# Patient Record
Sex: FEMALE | Race: WHITE | HISPANIC OR LATINO | URBAN - METROPOLITAN AREA
[De-identification: names, ages, dates, MRNs, and addresses within clinical notes are randomized per-mention and may not be internally consistent; named-entity substitution may affect disease eponyms.]

---

## 2017-09-22 ENCOUNTER — EMERGENCY (EMERGENCY)
Facility: HOSPITAL | Age: 82
LOS: 1 days | End: 2017-09-22
Payer: MEDICARE

## 2017-09-22 PROCEDURE — 71010: CPT | Mod: 26

## 2017-09-22 PROCEDURE — 71250 CT THORAX DX C-: CPT | Mod: 26

## 2017-09-22 PROCEDURE — 99285 EMERGENCY DEPT VISIT HI MDM: CPT

## 2019-05-25 ENCOUNTER — TRANSCRIPTION ENCOUNTER (OUTPATIENT)
Age: 84
End: 2019-05-25

## 2020-03-06 ENCOUNTER — INPATIENT (INPATIENT)
Facility: HOSPITAL | Age: 85
LOS: 3 days | Discharge: HOME HEALTH | DRG: 470 | End: 2020-03-10
Attending: FAMILY MEDICINE | Admitting: FAMILY MEDICINE
Payer: MEDICARE

## 2020-03-06 VITALS
WEIGHT: 130.95 LBS | SYSTOLIC BLOOD PRESSURE: 131 MMHG | TEMPERATURE: 98 F | RESPIRATION RATE: 16 BRPM | HEIGHT: 64 IN | DIASTOLIC BLOOD PRESSURE: 73 MMHG | HEART RATE: 85 BPM

## 2020-03-06 DIAGNOSIS — S72.002A FRACTURE OF UNSPECIFIED PART OF NECK OF LEFT FEMUR, INITIAL ENCOUNTER FOR CLOSED FRACTURE: ICD-10-CM

## 2020-03-06 DIAGNOSIS — N39.0 URINARY TRACT INFECTION, SITE NOT SPECIFIED: ICD-10-CM

## 2020-03-06 LAB
ABO RH CONFIRMATION: SIGNIFICANT CHANGE UP
ADD ON TEST-SPECIMEN IN LAB: SIGNIFICANT CHANGE UP
ADD ON TEST-SPECIMEN IN LAB: SIGNIFICANT CHANGE UP
ALBUMIN SERPL ELPH-MCNC: 2.7 G/DL — LOW (ref 3.3–5)
ALBUMIN SERPL ELPH-MCNC: 3 G/DL — LOW (ref 3.3–5)
ALP SERPL-CCNC: 113 U/L — SIGNIFICANT CHANGE UP (ref 40–120)
ALT FLD-CCNC: 13 U/L — SIGNIFICANT CHANGE UP (ref 12–78)
ANION GAP SERPL CALC-SCNC: 9 MMOL/L — SIGNIFICANT CHANGE UP (ref 5–17)
APPEARANCE UR: ABNORMAL
APTT BLD: 26.3 SEC — LOW (ref 27.5–36.3)
AST SERPL-CCNC: 19 U/L — SIGNIFICANT CHANGE UP (ref 15–37)
BACTERIA # UR AUTO: ABNORMAL
BASOPHILS # BLD AUTO: 0.07 K/UL — SIGNIFICANT CHANGE UP (ref 0–0.2)
BASOPHILS NFR BLD AUTO: 0.4 % — SIGNIFICANT CHANGE UP (ref 0–2)
BILIRUB SERPL-MCNC: 0.8 MG/DL — SIGNIFICANT CHANGE UP (ref 0.2–1.2)
BILIRUB UR-MCNC: NEGATIVE — SIGNIFICANT CHANGE UP
BLD GP AB SCN SERPL QL: SIGNIFICANT CHANGE UP
BUN SERPL-MCNC: 34 MG/DL — HIGH (ref 7–23)
CALCIUM SERPL-MCNC: 9 MG/DL — SIGNIFICANT CHANGE UP (ref 8.5–10.1)
CHLORIDE SERPL-SCNC: 107 MMOL/L — SIGNIFICANT CHANGE UP (ref 96–108)
CO2 SERPL-SCNC: 19 MMOL/L — LOW (ref 22–31)
COLOR SPEC: YELLOW — SIGNIFICANT CHANGE UP
CREAT SERPL-MCNC: 3.11 MG/DL — HIGH (ref 0.5–1.3)
DIFF PNL FLD: ABNORMAL
EOSINOPHIL # BLD AUTO: 0 K/UL — SIGNIFICANT CHANGE UP (ref 0–0.5)
EOSINOPHIL NFR BLD AUTO: 0 % — SIGNIFICANT CHANGE UP (ref 0–6)
EPI CELLS # UR: SIGNIFICANT CHANGE UP
GLUCOSE SERPL-MCNC: 115 MG/DL — HIGH (ref 70–99)
GLUCOSE UR QL: NEGATIVE MG/DL — SIGNIFICANT CHANGE UP
HCT VFR BLD CALC: 34.6 % — SIGNIFICANT CHANGE UP (ref 34.5–45)
HGB BLD-MCNC: 11.2 G/DL — LOW (ref 11.5–15.5)
IMM GRANULOCYTES NFR BLD AUTO: 0.7 % — SIGNIFICANT CHANGE UP (ref 0–1.5)
INR BLD: 1.19 RATIO — HIGH (ref 0.88–1.16)
KETONES UR-MCNC: NEGATIVE — SIGNIFICANT CHANGE UP
LEUKOCYTE ESTERASE UR-ACNC: ABNORMAL
LYMPHOCYTES # BLD AUTO: 1.24 K/UL — SIGNIFICANT CHANGE UP (ref 1–3.3)
LYMPHOCYTES # BLD AUTO: 6.8 % — LOW (ref 13–44)
MCHC RBC-ENTMCNC: 28.1 PG — SIGNIFICANT CHANGE UP (ref 27–34)
MCHC RBC-ENTMCNC: 32.4 GM/DL — SIGNIFICANT CHANGE UP (ref 32–36)
MCV RBC AUTO: 86.9 FL — SIGNIFICANT CHANGE UP (ref 80–100)
MONOCYTES # BLD AUTO: 1.17 K/UL — HIGH (ref 0–0.9)
MONOCYTES NFR BLD AUTO: 6.4 % — SIGNIFICANT CHANGE UP (ref 2–14)
NEUTROPHILS # BLD AUTO: 15.61 K/UL — HIGH (ref 1.8–7.4)
NEUTROPHILS NFR BLD AUTO: 85.7 % — HIGH (ref 43–77)
NITRITE UR-MCNC: NEGATIVE — SIGNIFICANT CHANGE UP
PH UR: 6 — SIGNIFICANT CHANGE UP (ref 5–8)
PLATELET # BLD AUTO: 315 K/UL — SIGNIFICANT CHANGE UP (ref 150–400)
POTASSIUM SERPL-MCNC: 3.5 MMOL/L — SIGNIFICANT CHANGE UP (ref 3.5–5.3)
POTASSIUM SERPL-SCNC: 3.5 MMOL/L — SIGNIFICANT CHANGE UP (ref 3.5–5.3)
PROT SERPL-MCNC: 8.3 GM/DL — SIGNIFICANT CHANGE UP (ref 6–8.3)
PROT UR-MCNC: 30 MG/DL
PROTHROM AB SERPL-ACNC: 13.3 SEC — HIGH (ref 10–12.9)
RBC # BLD: 3.98 M/UL — SIGNIFICANT CHANGE UP (ref 3.8–5.2)
RBC # FLD: 12.8 % — SIGNIFICANT CHANGE UP (ref 10.3–14.5)
RBC CASTS # UR COMP ASSIST: ABNORMAL /HPF (ref 0–4)
SODIUM SERPL-SCNC: 135 MMOL/L — SIGNIFICANT CHANGE UP (ref 135–145)
SP GR SPEC: 1.01 — SIGNIFICANT CHANGE UP (ref 1.01–1.02)
TROPONIN I SERPL-MCNC: <0.015 NG/ML — SIGNIFICANT CHANGE UP (ref 0.01–0.04)
UROBILINOGEN FLD QL: NEGATIVE MG/DL — SIGNIFICANT CHANGE UP
WBC # BLD: 18.22 K/UL — HIGH (ref 3.8–10.5)
WBC # FLD AUTO: 18.22 K/UL — HIGH (ref 3.8–10.5)
WBC UR QL: SIGNIFICANT CHANGE UP

## 2020-03-06 PROCEDURE — 93005 ELECTROCARDIOGRAM TRACING: CPT

## 2020-03-06 PROCEDURE — 87040 BLOOD CULTURE FOR BACTERIA: CPT

## 2020-03-06 PROCEDURE — 80048 BASIC METABOLIC PNL TOTAL CA: CPT

## 2020-03-06 PROCEDURE — 85610 PROTHROMBIN TIME: CPT

## 2020-03-06 PROCEDURE — 85730 THROMBOPLASTIN TIME PARTIAL: CPT

## 2020-03-06 PROCEDURE — 97116 GAIT TRAINING THERAPY: CPT | Mod: GP

## 2020-03-06 PROCEDURE — 73501 X-RAY EXAM HIP UNI 1 VIEW: CPT | Mod: RT

## 2020-03-06 PROCEDURE — 73502 X-RAY EXAM HIP UNI 2-3 VIEWS: CPT | Mod: 26,RT

## 2020-03-06 PROCEDURE — 80069 RENAL FUNCTION PANEL: CPT

## 2020-03-06 PROCEDURE — 36415 COLL VENOUS BLD VENIPUNCTURE: CPT

## 2020-03-06 PROCEDURE — 86920 COMPATIBILITY TEST SPIN: CPT

## 2020-03-06 PROCEDURE — 82040 ASSAY OF SERUM ALBUMIN: CPT

## 2020-03-06 PROCEDURE — 97110 THERAPEUTIC EXERCISES: CPT | Mod: GP

## 2020-03-06 PROCEDURE — 88305 TISSUE EXAM BY PATHOLOGIST: CPT

## 2020-03-06 PROCEDURE — 73552 X-RAY EXAM OF FEMUR 2/>: CPT | Mod: 26,RT

## 2020-03-06 PROCEDURE — 87186 SC STD MICRODIL/AGAR DIL: CPT

## 2020-03-06 PROCEDURE — 71250 CT THORAX DX C-: CPT

## 2020-03-06 PROCEDURE — 99223 1ST HOSP IP/OBS HIGH 75: CPT

## 2020-03-06 PROCEDURE — 71250 CT THORAX DX C-: CPT | Mod: 26

## 2020-03-06 PROCEDURE — 97162 PT EVAL MOD COMPLEX 30 MIN: CPT | Mod: GP

## 2020-03-06 PROCEDURE — 93010 ELECTROCARDIOGRAM REPORT: CPT

## 2020-03-06 PROCEDURE — 88311 DECALCIFY TISSUE: CPT

## 2020-03-06 PROCEDURE — 76770 US EXAM ABDO BACK WALL COMP: CPT

## 2020-03-06 PROCEDURE — C1776: CPT

## 2020-03-06 PROCEDURE — 84484 ASSAY OF TROPONIN QUANT: CPT

## 2020-03-06 PROCEDURE — 93306 TTE W/DOPPLER COMPLETE: CPT

## 2020-03-06 PROCEDURE — 74176 CT ABD & PELVIS W/O CONTRAST: CPT | Mod: 26

## 2020-03-06 PROCEDURE — 85025 COMPLETE CBC W/AUTO DIFF WBC: CPT

## 2020-03-06 PROCEDURE — 87086 URINE CULTURE/COLONY COUNT: CPT

## 2020-03-06 PROCEDURE — 97530 THERAPEUTIC ACTIVITIES: CPT | Mod: GP

## 2020-03-06 PROCEDURE — 76770 US EXAM ABDO BACK WALL COMP: CPT | Mod: 26

## 2020-03-06 PROCEDURE — 99232 SBSQ HOSP IP/OBS MODERATE 35: CPT

## 2020-03-06 PROCEDURE — 82306 VITAMIN D 25 HYDROXY: CPT

## 2020-03-06 PROCEDURE — 71045 X-RAY EXAM CHEST 1 VIEW: CPT | Mod: 26

## 2020-03-06 PROCEDURE — 74176 CT ABD & PELVIS W/O CONTRAST: CPT

## 2020-03-06 PROCEDURE — 85027 COMPLETE CBC AUTOMATED: CPT

## 2020-03-06 RX ORDER — SENNA PLUS 8.6 MG/1
2 TABLET ORAL AT BEDTIME
Refills: 0 | Status: DISCONTINUED | OUTPATIENT
Start: 2020-03-06 | End: 2020-03-07

## 2020-03-06 RX ORDER — PREGABALIN 225 MG/1
1000 CAPSULE ORAL DAILY
Refills: 0 | Status: DISCONTINUED | OUTPATIENT
Start: 2020-03-06 | End: 2020-03-07

## 2020-03-06 RX ORDER — MORPHINE SULFATE 50 MG/1
2 CAPSULE, EXTENDED RELEASE ORAL EVERY 4 HOURS
Refills: 0 | Status: DISCONTINUED | OUTPATIENT
Start: 2020-03-06 | End: 2020-03-06

## 2020-03-06 RX ORDER — HYDROMORPHONE HYDROCHLORIDE 2 MG/ML
0.5 INJECTION INTRAMUSCULAR; INTRAVENOUS; SUBCUTANEOUS EVERY 6 HOURS
Refills: 0 | Status: DISCONTINUED | OUTPATIENT
Start: 2020-03-06 | End: 2020-03-07

## 2020-03-06 RX ORDER — CEFTRIAXONE 500 MG/1
1000 INJECTION, POWDER, FOR SOLUTION INTRAMUSCULAR; INTRAVENOUS ONCE
Refills: 0 | Status: COMPLETED | OUTPATIENT
Start: 2020-03-06 | End: 2020-03-06

## 2020-03-06 RX ORDER — MORPHINE SULFATE 50 MG/1
4 CAPSULE, EXTENDED RELEASE ORAL ONCE
Refills: 0 | Status: DISCONTINUED | OUTPATIENT
Start: 2020-03-06 | End: 2020-03-06

## 2020-03-06 RX ORDER — SODIUM CHLORIDE 9 MG/ML
1000 INJECTION INTRAMUSCULAR; INTRAVENOUS; SUBCUTANEOUS
Refills: 0 | Status: DISCONTINUED | OUTPATIENT
Start: 2020-03-06 | End: 2020-03-07

## 2020-03-06 RX ORDER — ASPIRIN/CALCIUM CARB/MAGNESIUM 324 MG
81 TABLET ORAL DAILY
Refills: 0 | Status: DISCONTINUED | OUTPATIENT
Start: 2020-03-06 | End: 2020-03-06

## 2020-03-06 RX ORDER — ONDANSETRON 8 MG/1
4 TABLET, FILM COATED ORAL EVERY 6 HOURS
Refills: 0 | Status: DISCONTINUED | OUTPATIENT
Start: 2020-03-06 | End: 2020-03-07

## 2020-03-06 RX ORDER — TRAMADOL HYDROCHLORIDE 50 MG/1
25 TABLET ORAL EVERY 6 HOURS
Refills: 0 | Status: DISCONTINUED | OUTPATIENT
Start: 2020-03-06 | End: 2020-03-07

## 2020-03-06 RX ORDER — POLYETHYLENE GLYCOL 3350 17 G/17G
17 POWDER, FOR SOLUTION ORAL DAILY
Refills: 0 | Status: DISCONTINUED | OUTPATIENT
Start: 2020-03-06 | End: 2020-03-07

## 2020-03-06 RX ORDER — SODIUM CHLORIDE 9 MG/ML
1000 INJECTION, SOLUTION INTRAVENOUS
Refills: 0 | Status: DISCONTINUED | OUTPATIENT
Start: 2020-03-06 | End: 2020-03-06

## 2020-03-06 RX ORDER — ASPIRIN/CALCIUM CARB/MAGNESIUM 324 MG
81 TABLET ORAL DAILY
Refills: 0 | Status: DISCONTINUED | OUTPATIENT
Start: 2020-03-06 | End: 2020-03-07

## 2020-03-06 RX ORDER — ASPIRIN/CALCIUM CARB/MAGNESIUM 324 MG
1 TABLET ORAL
Qty: 0 | Refills: 0 | DISCHARGE

## 2020-03-06 RX ORDER — PREGABALIN 225 MG/1
1 CAPSULE ORAL
Qty: 0 | Refills: 0 | DISCHARGE

## 2020-03-06 RX ORDER — TRAMADOL HYDROCHLORIDE 50 MG/1
50 TABLET ORAL EVERY 6 HOURS
Refills: 0 | Status: DISCONTINUED | OUTPATIENT
Start: 2020-03-06 | End: 2020-03-07

## 2020-03-06 RX ORDER — ACETAMINOPHEN 500 MG
975 TABLET ORAL EVERY 8 HOURS
Refills: 0 | Status: DISCONTINUED | OUTPATIENT
Start: 2020-03-06 | End: 2020-03-07

## 2020-03-06 RX ORDER — SODIUM CHLORIDE 9 MG/ML
1000 INJECTION, SOLUTION INTRAVENOUS
Refills: 0 | Status: DISCONTINUED | OUTPATIENT
Start: 2020-03-06 | End: 2020-03-07

## 2020-03-06 RX ORDER — HEPARIN SODIUM 5000 [USP'U]/ML
5000 INJECTION INTRAVENOUS; SUBCUTANEOUS EVERY 8 HOURS
Refills: 0 | Status: COMPLETED | OUTPATIENT
Start: 2020-03-06 | End: 2020-03-06

## 2020-03-06 RX ORDER — FAMOTIDINE 10 MG/ML
20 INJECTION INTRAVENOUS DAILY
Refills: 0 | Status: DISCONTINUED | OUTPATIENT
Start: 2020-03-06 | End: 2020-03-07

## 2020-03-06 RX ADMIN — Medication 81 MILLIGRAM(S): at 18:32

## 2020-03-06 RX ADMIN — Medication 975 MILLIGRAM(S): at 22:07

## 2020-03-06 RX ADMIN — SENNA PLUS 2 TABLET(S): 8.6 TABLET ORAL at 22:03

## 2020-03-06 RX ADMIN — MORPHINE SULFATE 4 MILLIGRAM(S): 50 CAPSULE, EXTENDED RELEASE ORAL at 11:25

## 2020-03-06 RX ADMIN — Medication 975 MILLIGRAM(S): at 22:03

## 2020-03-06 RX ADMIN — FAMOTIDINE 20 MILLIGRAM(S): 10 INJECTION INTRAVENOUS at 18:32

## 2020-03-06 RX ADMIN — HEPARIN SODIUM 5000 UNIT(S): 5000 INJECTION INTRAVENOUS; SUBCUTANEOUS at 22:03

## 2020-03-06 RX ADMIN — MORPHINE SULFATE 4 MILLIGRAM(S): 50 CAPSULE, EXTENDED RELEASE ORAL at 14:34

## 2020-03-06 RX ADMIN — CEFTRIAXONE 1000 MILLIGRAM(S): 500 INJECTION, POWDER, FOR SOLUTION INTRAMUSCULAR; INTRAVENOUS at 12:28

## 2020-03-06 RX ADMIN — Medication 975 MILLIGRAM(S): at 15:33

## 2020-03-06 RX ADMIN — SODIUM CHLORIDE 125 MILLILITER(S): 9 INJECTION INTRAMUSCULAR; INTRAVENOUS; SUBCUTANEOUS at 15:34

## 2020-03-06 RX ADMIN — HYDROMORPHONE HYDROCHLORIDE 0.5 MILLIGRAM(S): 2 INJECTION INTRAMUSCULAR; INTRAVENOUS; SUBCUTANEOUS at 15:34

## 2020-03-06 NOTE — ED PROVIDER NOTE - NS ED ROS FT
Constitutional: nad, well appearing  HEENT:  + nasal congestion, eye drainage or ear pain.    CVS:  no cp  Resp:  No sob, no cough  GI:  no abdominal pain, no nausea or vomiting  :  no dysuria  MSK: + limited ROM/ RLE pain  Skin: no rash  Neuro: no change in mental status or level of consciousness  Heme/lymph: no bleeding

## 2020-03-06 NOTE — ED ADULT NURSE NOTE - OBJECTIVE STATEMENT
Pt reports not feeling well over past few days and therefore not eating anything.  Pt reports that she felt fatigued and weak yesterday and fell at night while turning. Pt reports that her daughter helped to make her comfortable on the floor, but that she did not want to call the ambulance until this am. Pt denies LOC, dizziness, fever, or chills. Reports right hip pain that worsens with movement. +PPx4.

## 2020-03-06 NOTE — CONSULT NOTE ADULT - SUBJECTIVE AND OBJECTIVE BOX
Chief complaints.  Presents post fall at home--noted for Right Hip fx.    HPI:  83 yo woman with unclear PMHX and no medical follow up for several years except for GYN and urgent care centers fell at home due to dizzyness yesterday.  Daughter reports pt felt well on Tuesday, 3/3 and had an active day followed by fatigue and then nausea.  Pt and daughter are adamant that this was due to post nasal drip.  Denies vomiting, diarrhea or abdominal pain.  However, pt had virtually no po intake for 2 1/2 days and when she got up yesterday to let her dog out, she felt dizzy and fell.  Daughter reports pt has had significant weight loss and very decreased po intake since losing her  5 years ago.  However, she has not had any evaluation for this.  On admission, noted with Bun/creat =34/3.11 and more problematically bilateral Hydronephrosis.    Pt reports she was told of "prolapsed bladder" by her GYN but has not had  evaluation.  Pt is tentatively scheduled for OR in am.  Pt is on no meds.  Very seldom takes NSAIDS for arthritis.    PMHX and PSHX.  Unknown.  Unclear hx of Prolapsed Bladder  Arthritis.    FAMILY HISTORY: N/C    SOCIAL HISTORY : No hx of smoking or ETOH.  Allergies    No Known Allergies    REVIEW OF SYSTEMS :  Denies abdominal pain  Denies SOB  Denies chest discomfort  Denies nausea currently  Denies dysuria      MEDICATIONS  (STANDING):  acetaminophen   Tablet .. 975 milliGRAM(s) Oral every 8 hours  aspirin  chewable 81 milliGRAM(s) Oral daily  cyanocobalamin 1000 MICROGram(s) Oral daily  famotidine    Tablet 20 milliGRAM(s) Oral daily  heparin SubCutaneous Injection - Peds 5000 Unit(s) SubCutaneous every 8 hours  lactated ringers. 1000 milliLiter(s) (75 mL/Hr) IV Continuous <Continuous>  polyethylene glycol 3350 17 Gram(s) Oral daily  senna 2 Tablet(s) Oral at bedtime  sodium chloride 0.9%. 1000 milliLiter(s) (125 mL/Hr) IV Continuous <Continuous>    MEDICATIONS  (PRN):  HYDROmorphone  Injectable 0.5 milliGRAM(s) IV Push every 6 hours PRN Severe Pain (7 - 10)  ondansetron Injectable 4 milliGRAM(s) IV Push every 6 hours PRN Nausea and/or Vomiting  traMADol 25 milliGRAM(s) Oral every 6 hours PRN Mild Pain (1 - 3)  traMADol 50 milliGRAM(s) Oral every 6 hours PRN Moderate Pain (4 - 6)         Vital Signs Last 24 Hrs  T(C): 36.7 (06 Mar 2020 11:19), Max: 36.9 (06 Mar 2020 09:48)  T(F): 98.1 (06 Mar 2020 11:19), Max: 98.4 (06 Mar 2020 09:48)  HR: 84 (06 Mar 2020 11:19) (84 - 85)  BP: 101/61 (06 Mar 2020 11:19) (101/61 - 131/73)  BP(mean): --  RR: 17 (06 Mar 2020 11:19) (16 - 17)  SpO2: 98% (06 Mar 2020 11:) (98% - 98%)  Daily Height in cm: 162.56 (06 Mar 2020 09:48)    Daily   I&O's Summary      PHYSICAL EXAM:  Alert and appropriate  GEN: No distress  HEENT: WNL  NECK : supple  CV: S1S2 RRR  LUNGS: Clear to aus  ABD: soft  EXT: no edema    LABS:                        11.2   18.22 )-----------( 315      ( 06 Mar 2020 10:01 )             34.6     03-06    135  |  107  |  34<H>  ----------------------------<  115<H>  3.5   |  19<L>  |  3.11<H>    Ca    9.0      06 Mar 2020 10:01    TPro  8.3  /  Alb  3.0<L>  /  TBili  0.8  /  DBili  x   /  AST  19  /  ALT  13  /  AlkPhos  113  03-06    PT/INR - ( 06 Mar 2020 10:01 )   PT: 13.3 sec;   INR: 1.19 ratio         PTT - ( 06 Mar 2020 10:01 )  PTT:26.3 sec  Urinalysis Basic - ( 06 Mar 2020 11:55 )    Color: Yellow / Appearance: very cloudy / S.010 / pH: x  Gluc: x / Ketone: Negative  / Bili: Negative / Urobili: Negative mg/dL   Blood: x / Protein: 30 mg/dL / Nitrite: Negative   Leuk Esterase: Moderate / RBC: 6-10 /HPF / WBC 3-5   Sq Epi: x / Non Sq Epi: Occasional / Bacteria: TNTC        < from: US Kidney and Bladder (20 @ 13:28) >  EXAM:  US KIDNEYS AND BLADDER                            PROCEDURE DATE:  2020          INTERPRETATION:  CLINICAL INFORMATION: Renal failure.    COMPARISON: None available.    TECHNIQUE: Sonography of the kidneys and bladder.     FINDINGS:    Right kidney:  12 cm. Moderate hydroureteronephrosis. Normal cortical echogenicity    Left kidney:  12.2 cm. Moderate hydroureteronephrosis. Normal cortical echogenicity.    Urinary bladder: Moderately distended with layering debris.    IMPRESSION:     Moderate bilateral hydroureteronephrosis.  Layering debris within the urinary bladder.        SHELLY BARRETT M.D. ATTENDING RADIOLOGIST  This document has been electronically signed. Mar  6 2020  1:48PM                < end of copied text >

## 2020-03-06 NOTE — CONSULT NOTE ADULT - ASSESSMENT
Preoperative cardiac evaluation - Pt was noted to have abnormal ekg.  pt did not f/u with physician in 2 yrs and she did not have any cardiac evaluation before. SHe denies any cardiac symptoms at this time.  Recommend 2 D echocardiogram  Earnestine base further recommendations on the echo results.    HTN- BP elevated at times.  pain control and monitor for now.  Will recommend low dose beta blocker.  D/W Dr Del Angel.     Other medical issues- Management per primary team.   Thank you for allowing me to participate in the care of this patient. Please feel free to contact me with any questions.

## 2020-03-06 NOTE — ED PROVIDER NOTE - PROGRESS NOTE DETAILS
Pt declines CTH and C spine.  Well appearing,  No mental status changes.  XR hip with fracture. Discussed with ortho who recommends admission.  Pt also noted to have UTI.  Treated.  Discussed with Dr. Faith, hospitalist, who has accepted for further w/u and management. Further care per inpatient treatment team.

## 2020-03-06 NOTE — CONSULT NOTE ADULT - ASSESSMENT
83 yo woman with unknown medical hx admitted with Right Hip fx due to fall.  Noted for renal dysfunction with unknown hx and bilateral moderate hydronephrosis.  Kidneys are noted to be of normal echogenicity and size indicating obstruction may be more acute.  At this point, level of obstruction is unclear.  Suggest  evaluation prior to surgery to resolve obstruction.  Feel combination of obstructive uropathy with possible post op ATN would result in unacceptable loss of kidney function leading to greater complications, mary ellen when pt's baseline renal function is unknown with current GFR at <15 cc/min.  CT non-contrast to attempt to identify level of obstruction.  Will re-evaluate in am once  evaluation is done.

## 2020-03-06 NOTE — H&P ADULT - NSHPPHYSICALEXAM_GEN_ALL_CORE
Vital Signs Last 24 Hrs  T(C): 36.7 (06 Mar 2020 11:19), Max: 36.9 (06 Mar 2020 09:48)  T(F): 98.1 (06 Mar 2020 11:19), Max: 98.4 (06 Mar 2020 09:48)  HR: 84 (06 Mar 2020 11:19) (84 - 85)  BP: 101/61 (06 Mar 2020 11:19) (101/61 - 131/73)  RR: 17 (06 Mar 2020 11:19) (16 - 17)  SpO2: 98% (06 Mar 2020 11:19) (98% - 98%)    PHYSICAL EXAM:    GENERAL: Comfortable, no acute distress   HEAD:  Normocephalic, +frontal hematoma.   EYES: EOMI, PERRLA  HEENT: dry  mucous membranes  NECK: Supple, No JVD  NERVOUS SYSTEM:  Alert & Oriented X3, non focal.   CHEST/LUNG: Clear to auscultation bilaterally  HEART: Regular rate and rhythm  ABDOMEN: Soft, Nontender, Nondistended, Bowel sounds present  GENITOURINARY: Voiding, no palpable bladder  EXTREMITIES:   No clubbing, cyanosis, or edema  MUSCULOSKELETAL- Right Lower extremity shortened, everted.  SKIN-no rash

## 2020-03-06 NOTE — CONSULT NOTE ADULT - SUBJECTIVE AND OBJECTIVE BOX
84y Female presents to ED c/o Right hip pain and inability to ambulate sp mechanical fall. She went to let the dog out this morning and fell onto right side. States hit her head against the wall but denies LOC. Ambulates without assistive device. Denies right hip pain prior to this fall. Denies numbness/tingling RLE. Denies fever/chills. No other extremity complaints. Last ate 2 days ago. Pt states she has been sick with post nasal drip and as a result has not had an appetite. Son and daughter at bedside.     PMH: decsened bladder  NKDA                            11.2   18.22 )-----------( 315      ( 06 Mar 2020 10:01 )             34.6     06 Mar 2020 10:01    135    |  107    |  34     ----------------------------<  115    3.5     |  19     |  3.11     Ca    9.0        06 Mar 2020 10:01    TPro  8.3    /  Alb  3.0    /  TBili  0.8    /  DBili  x      /  AST  19     /  ALT  13     /  AlkPhos  113    06 Mar 2020 10:01    PT/INR - ( 06 Mar 2020 10:01 )   PT: 13.3 sec;   INR: 1.19 ratio         PTT - ( 06 Mar 2020 10:01 )  PTT:26.3 sec  Vital Signs Last 24 Hrs  T(C): 36.7 (03-06-20 @ 11:19), Max: 36.9 (03-06-20 @ 09:48)  T(F): 98.1 (03-06-20 @ 11:19), Max: 98.4 (03-06-20 @ 09:48)  HR: 84 (03-06-20 @ 11:19) (84 - 85)  BP: 101/61 (03-06-20 @ 11:19) (101/61 - 131/73)  BP(mean): --  RR: 17 (03-06-20 @ 11:19) (16 - 17)  SpO2: 98% (03-06-20 @ 11:19) (98% - 98%)    Imaging: XR pelvis/ rigth hip: demonstates right femoral neck fracture    Physical Exam  General: NAD, Alert, Awake and oriented  B/L UE: skin intact No pain, FROM, non tender, no deformities, NVI, SILT     LLE: able to SLR, no bony tenderness, neg heel strike, neg log roll, FROM hip knee ankle and toes, SILT    RIGHT LE: No open skin.  No deformities or other signs of trauma at  knee, lower leg, ankle or foot. Full baseline painless ROM at ankle and toes. Positive log-roll and heel strike. Unable to actively SLR. SILT toes 1-5. + DP/PT pulses palpable with brisk cap refill distally. Compartments soft and compressible.

## 2020-03-06 NOTE — ED PROVIDER NOTE - CLINICAL SUMMARY MEDICAL DECISION MAKING FREE TEXT BOX
Given age will evaul for intercranial head trauma with CT head, will evaul for pain with CT. Concern for hip fracture, will evaul with imaging. Will send for pre op labs in the event this is a fracture, dispo pending imaging.

## 2020-03-06 NOTE — ED ADULT NURSE REASSESSMENT NOTE - NS ED NURSE REASSESS COMMENT FT1
MD Hospitalist at bedside and confirmed to give abx with no anticipated BC.  Pt awaiting bed availability at this time. Will cont to monitor.  Reports good relief of pain with medication.

## 2020-03-06 NOTE — ED PROVIDER NOTE - PHYSICAL EXAMINATION
Constitutional: NAD, well appearing  HEENT: no rhinorrhea, PERRL, no oropharyngeal erythema or exudates, midline uvula.  TMs clear.  CVS:  RRR, no m/r/g  Resp:  CTAB  GI: soft, ntnd  MSK:  no restriction to rom, full ROM to all extremities  Neuro:  A&Ox3, 5/5 strength to all extremities,  SILT to all extremities  Skin: no rash  psych: clear thought content  Heme/lymph:  No LAD Constitutional: NAD, well appearing  HEENT: no rhinorrhea, PERRL, no oropharyngeal erythema or exudates, midline uvula.  TMs clear.  CVS:  RRR, no m/r/g  Resp:  CTAB  GI: soft, ntnd  MSK: No midline spinal tenderness. TTP right hip. pain with any form of ROM  Neuro:  A&Ox3, 5/5 strength to all extremities,  SILT to all extremities. Neuro vascular intact to RLE.   Skin: +Frontal Scalp hematoma.   psych: clear thought content  Heme/lymph:  No LAD

## 2020-03-06 NOTE — H&P ADULT - HISTORY OF PRESENT ILLNESS
c/c: fall , right hip fracture    HPI: 84F, h/o bladder prolapse, does not follow regularly with physicians who presented to the ER with inability to ambulate after a fall. She hadn't been feeling well for the past few days with rhinitis. She got up to let her dog out, felt dizzy and fell last night. She wasn't able to get up on her own. She dragged herself to the den where her daughter found her around 11pm last night. She helped her into a more comfortable position and slept there overnight. She was brought to the ER today and found to have RIght hip fracture. Planned for OR 3/7. She denies any medical history. No h/o MI/stroke/vascular disease. Does not really exercise. Ambulates within her home and can climb her stairs without difficulty. No recent chest pains/sob. Denies fever but did have chills. No cough.  No n/v/d. Has had poor appetite/po intake since her  passed away 5 years ago.   +weight loss, can not quantify.  Denies decreased urine outpt/dysuria/hematuria. had vague flank pain a few days ago which was self limited. Denies recent nsaid use. Takes asa 81 daily when she remembers.    ROS: all 10 systems reviewed and is as above otherwise negative.     PMH: as above  PSH: denies  F/H: denies significant medical conditions in immediate family members  Social: denies tobacco/etoh use  Allergies: NKDA  Home meds: see med rec.

## 2020-03-06 NOTE — CONSULT NOTE ADULT - SUBJECTIVE AND OBJECTIVE BOX
Patient is a 84y old  Female who presents with a chief complaint of right hip fracture.       HPI:  c/c: fall , right hip fracture    HPI: 84F, h/o bladder prolapse, does not follow regularly with physicians who presented to the ER with inability to ambulate after a fall. She hadn't been feeling well for the past few days with rhinitis. She got up to let her dog out, felt dizzy and fell last night. She wasn't able to get up on her own. She dragged herself to the den where her daughter found her around 11pm last night. She helped her into a more comfortable position and slept there overnight. She was brought to the ER today and found to have RIght hip fracture. Planned for OR 3/7. She denies any medical history. No h/o MI/stroke/vascular disease. Does not really exercise. Ambulates within her home and can climb her stairs without difficulty. No recent chest pains/sob. Denies fever but did have chills. No cough.  No n/v/d. Has had poor appetite/po intake since her  passed away 5 years ago.   +weight loss, can not quantify.  Denies decreased urine outpt/dysuria/hematuria. had vague flank pain a few days ago which was self limited. Denies recent nsaid use. Takes asa 81 daily when she remembers.      PMH: as above  PSH: denies  F/H: denies significant medical conditions in immediate family members  Social: denies tobacco/etoh use  Allergies: NKDA  Home meds: see med rec. (06 Mar 2020 12:55)      PAST MEDICAL & SURGICAL HISTORY:      MEDICATIONS  (STANDING):  acetaminophen   Tablet .. 975 milliGRAM(s) Oral every 8 hours  aspirin  chewable 81 milliGRAM(s) Oral daily  cyanocobalamin 1000 MICROGram(s) Oral daily  famotidine    Tablet 20 milliGRAM(s) Oral daily  heparin SubCutaneous Injection - Peds 5000 Unit(s) SubCutaneous every 8 hours  lactated ringers. 1000 milliLiter(s) (75 mL/Hr) IV Continuous <Continuous>  polyethylene glycol 3350 17 Gram(s) Oral daily  senna 2 Tablet(s) Oral at bedtime  sodium chloride 0.9%. 1000 milliLiter(s) (125 mL/Hr) IV Continuous <Continuous>    MEDICATIONS  (PRN):  HYDROmorphone  Injectable 0.5 milliGRAM(s) IV Push every 6 hours PRN Severe Pain (7 - 10)  ondansetron Injectable 4 milliGRAM(s) IV Push every 6 hours PRN Nausea and/or Vomiting  traMADol 25 milliGRAM(s) Oral every 6 hours PRN Mild Pain (1 - 3)  traMADol 50 milliGRAM(s) Oral every 6 hours PRN Moderate Pain (4 - 6)      REVIEW OF SYSTEMS:  CONSTITUTIONAL:    No fatigue, malaise, lethargy.  No fever or chills.  RESPIRATORY:  No cough.  No wheeze.  No hemoptysis.  No shortness of breath.  CARDIOVASCULAR:  No chest pains.  No palpitations. No shortness of breath, No orthopnea or PND.  GASTROINTESTINAL:  No abdominal pain.  No nausea or vomiting.    GENITOURINARY:    No hematuria.    MUSCULOSKELETAL:  c/o musculoskeletal pain.  No joint swelling.  No arthritis.  NEUROLOGICAL:  No tingling or numbness or weakness.  PSYCHIATRIC:  No confusion          Vital Signs Last 24 Hrs  T(C): 36.7 (06 Mar 2020 11:19), Max: 36.9 (06 Mar 2020 09:48)  T(F): 98.1 (06 Mar 2020 11:19), Max: 98.4 (06 Mar 2020 09:48)  HR: 83 (06 Mar 2020 12:00) (83 - 85)  BP: 117/82 (06 Mar 2020 12:00) (101/61 - 131/73)  BP(mean): --  RR: 18 (06 Mar 2020 12:00) (16 - 18)  SpO2: 100% (06 Mar 2020 12:00) (98% - 100%)    PHYSICAL EXAM-    Constitutional: elderly frail female in no acute distress      Head: Head is normocephalic and atraumatic.      Neck:  No JVD.     Cardiovascular: Regular rate and rhythm without S3, S4. No murmurs or rubs are appreciated.      Respiratory: Breath sounds are normal. No rales. No wheezing.    Abdomen: Soft, nontender, nondistended with positive bowel sounds.      Extremity: No tenderness. No  pitting edema     Neurologic: The patient is alert and oriented.      Skin: No rash, no obvious lesions noted.      Psychiatric: The patient appears to be emotionally stable.      INTERPRETATION OF TELEMETRY: SR     ECG: Sinus rythm , RBBB, T wave inversion in V2-3, III.     I&O's Detail      LABS:                        11.2   18.22 )-----------( 315      ( 06 Mar 2020 10:01 )             34.6     03-06    135  |  107  |  34<H>  ----------------------------<  115<H>  3.5   |  19<L>  |  3.11<H>    Ca    9.0      06 Mar 2020 10:01    TPro  8.3  /  Alb  3.0<L>  /  TBili  0.8  /  DBili  x   /  AST  19  /  ALT  13  /  AlkPhos  113  03-06    CARDIAC MARKERS ( 06 Mar 2020 10:01 )  <0.015 ng/mL / x     / 189 U/L / x     / x          PT/INR - ( 06 Mar 2020 10:01 )   PT: 13.3 sec;   INR: 1.19 ratio         PTT - ( 06 Mar 2020 10:01 )  PTT:26.3 sec  Urinalysis Basic - ( 06 Mar 2020 11:55 )    Color: Yellow / Appearance: very cloudy / S.010 / pH: x  Gluc: x / Ketone: Negative  / Bili: Negative / Urobili: Negative mg/dL   Blood: x / Protein: 30 mg/dL / Nitrite: Negative   Leuk Esterase: Moderate / RBC: 6-10 /HPF / WBC 3-5   Sq Epi: x / Non Sq Epi: Occasional / Bacteria: TNTC      I&O's Summary    BNP  RADIOLOGY & ADDITIONAL STUDIES:

## 2020-03-06 NOTE — ED PROVIDER NOTE - OBJECTIVE STATEMENT
85y/o F presents to the ED s/p fall while letting dog outside last night c/o right LE pain with assoc. sx of inability to weight bear. Pt states that she felt sx of lightheadedness prior to fall, fell onto right hip and hit her head, no LOC. Pt stayed on the floor until this morning due to inability to get up independently. She reports recent sx of nasal congestion that have been making her feel "lousy". No CP/SOB.

## 2020-03-06 NOTE — ED ADULT TRIAGE NOTE - IDEAL BODY WEIGHT(KG)
55
no loss of consciousness, no gait abnormality, no headache, no sensory deficits, and no weakness.

## 2020-03-06 NOTE — PATIENT PROFILE ADULT - ...
Dr. García: I performed a face to face bedside interview with patient regarding history of present illness, review of symptoms and past medical history. I completed an independent physical exam.  I have discussed patient's plan of care with PA.   I agree with note as stated above, having amended the EMR as needed to reflect my findings.   This includes HISTORY OF PRESENT ILLNESS, HIV, PAST MEDICAL/SURGICAL/FAMILY/SOCIAL HISTORY, ALLERGIES AND HOME MEDICATIONS, REVIEW OF SYSTEMS, PHYSICAL EXAM, and any PROGRESS NOTES during the time I functioned as the attending physician for this patient.    68M p/w with HTN and Hep C on Humera p/w pain to left foot dorsum x 3 days. Pt states a power wash ran over his left foot, suffered abrasions to the foot. Able to ambulate. Requesting abx. Noticed some redness around wound. Tdap utd. Pt has been pouring H2O2 over it.    Exam significant for avulsed skin at the base of 2nd 3rd and 4th toes over the dorsum with minimal erythema. No bony ttp.    Plan - advised pt to dc H2O2, xray and abx, but pt eloped prior to abx and xray. Called pt at home and advised return but pt declined. Offered to sent Rx for abx to pharmacy, but pt declined to give pharmacy info 06-Mar-2020 17:38:37

## 2020-03-06 NOTE — H&P ADULT - ASSESSMENT
83F, pmh as above a/w:    1. Fall/Right hip fracture:  -admit to medicine  -repeat ekg.   -likely dizzy d/t dehydration/hypovolemia  -for OR tomorrow.   -if labs improved tomorrow and repeat ekg without concerning changes,  no medical contraindications for OR.  -physical therapy post op  -incentive spirometry  -bowel regimen.     2. Leukocytosis:  -?reactive to fall/fracture  -repeat in am.   -u/a not consistent with uti, dc abx.    3. 83F, pmh as above a/w:    1. Fall/Right hip fracture:  -admit to medicine  -repeat ekg.   -likely dizzy d/t dehydration/hypovolemia  -for OR tomorrow.   -if labs improved tomorrow and repeat ekg without concerning changes,  no medical contraindications for OR.  -physical therapy post op  -incentive spirometry  -bowel regimen.     2. Leukocytosis:  -likely reactive to fall/fracture  -repeat in am.   -u/a not consistent with uti, dc abx.  -monitor closely for s/s of infn.    3. FRANCIS vs CKD:  -sono ordered earlier, results noted,   -?obstructive uropathy from incomplete emptying?  -check bladder scan, straight cath if >300cc post void residual.   -CPK added on, no rhabdo    4. DVT px:  -hep sq.

## 2020-03-06 NOTE — CONSULT NOTE ADULT - ASSESSMENT
A/P: 84y Female with Right hip femoral neck fracture    Plan:  Pain control  NWB Right LE, bedrest  medical admission for optimization for surgery   options risks benefits complications reviewed with patient and family at bedside. They understand and agree to proposed procedure, Right hip jojo vs possible total hip replacement   consent obtained for surgery and blood transfusion   FU labs, CBC, EKG, CXR, BMP, PT , PTT, T&S  NPO after mindnight   hold chemical anticoagulation   venodynes/foot pumps  Discussed above with Ortho Attending on call Dr Holm who is aware and agrees with plan   will plan for OR tomorrow morning

## 2020-03-06 NOTE — ED PROVIDER NOTE - CARE PLAN
Principal Discharge DX:	Hip fracture, left, closed, initial encounter  Secondary Diagnosis:	Urinary tract infection without hematuria, site unspecified

## 2020-03-06 NOTE — PROGRESS NOTE ADULT - PROBLEM SELECTOR PLAN 1
Hydro secondary to baldder floor prolapse,  CAT pending, Rosario placed.  May have procedure from , Observe creatinine  If no improvement may need nephrostomies and possible GUN surgery

## 2020-03-06 NOTE — CONSULT NOTE ADULT - ATTENDING COMMENTS
A closed reduction with no manipulation was done by allowing the patient to lay as she felt comfortable.   The hip was not manipulated.

## 2020-03-06 NOTE — ED ADULT NURSE NOTE - NSIMPLEMENTINTERV_GEN_ALL_ED
Implemented All Fall with Harm Risk Interventions:  [place] to call system. Call bell, personal items and telephone within reach. Instruct patient to call for assistance. Room bathroom lighting operational. Non-slip footwear when patient is off stretcher. Physically safe environment: no spills, clutter or unnecessary equipment. Stretcher in lowest position, wheels locked, appropriate side rails in place. Provide visual cue, wrist band, yellow gown, etc. Monitor gait and stability. Monitor for mental status changes and reorient to person, place, and time. Review medications for side effects contributing to fall risk. Reinforce activity limits and safety measures with patient and family. Provide visual clues: red socks.

## 2020-03-06 NOTE — ED ADULT NURSE NOTE - CHIEF COMPLAINT QUOTE
Pt. to ED BIBA S/P Unwitnessed Fall. Pt. states she tripped and fell and landed on right hip. + Small hematoma in front of head. Denies LOC, Aspirin, Blood Thinners and major medical hx.  GSC 15- Denies CP, SOB, Abdominal injuries. Pt. does not meet Criteria for TA at this time.

## 2020-03-06 NOTE — PROGRESS NOTE ADULT - SUBJECTIVE AND OBJECTIVE BOX
CHIEF COMPLAINT:Bladder prolapse    HISTORY OF PRESENT ILLNESS:Creatinine elevation    PAST MEDICAL & SURGICAL HISTORY:Hip Fracture      REVIEW OF SYSTEMS:    CONSTITUTIONAL: No weakness, fevers or chills  EYES/ENT: No visual changes;  No vertigo or throat pain   NECK: No pain or stiffness  RESPIRATORY: No cough, wheezing, hemoptysis; No shortness of breath  CARDIOVASCULAR: No chest pain or palpitations  GASTROINTESTINAL: No abdominal or epigastric pain. No nausea, vomiting, or hematemesis; No diarrhea or constipation. No melena or hematochezia.  GENITOURINARY: creastinine elevation  NEUROLOGICAL: No numbness or weakness  SKIN: No itching, burning, rashes, or lesions   All other review of systems is negative unless indicated above.    MEDICATIONS  (STANDING):  acetaminophen   Tablet .. 975 milliGRAM(s) Oral every 8 hours  aspirin  chewable 81 milliGRAM(s) Oral daily  cyanocobalamin 1000 MICROGram(s) Oral daily  famotidine    Tablet 20 milliGRAM(s) Oral daily  heparin SubCutaneous Injection - Peds 5000 Unit(s) SubCutaneous every 8 hours  lactated ringers. 1000 milliLiter(s) (75 mL/Hr) IV Continuous <Continuous>  polyethylene glycol 3350 17 Gram(s) Oral daily  senna 2 Tablet(s) Oral at bedtime  sodium chloride 0.9%. 1000 milliLiter(s) (125 mL/Hr) IV Continuous <Continuous>    MEDICATIONS  (PRN):  HYDROmorphone  Injectable 0.5 milliGRAM(s) IV Push every 6 hours PRN Severe Pain (7 - 10)  ondansetron Injectable 4 milliGRAM(s) IV Push every 6 hours PRN Nausea and/or Vomiting  traMADol 25 milliGRAM(s) Oral every 6 hours PRN Mild Pain (1 - 3)  traMADol 50 milliGRAM(s) Oral every 6 hours PRN Moderate Pain (4 - 6)      Allergies    No Known Allergies    Intolerances        SOCIAL HISTORY:    FAMILY HISTORY:      Vital Signs Last 24 Hrs  T(C): 36.5 (06 Mar 2020 17:18), Max: 36.9 (06 Mar 2020 09:48)  T(F): 97.7 (06 Mar 2020 17:18), Max: 98.4 (06 Mar 2020 09:48)  HR: 75 (06 Mar 2020 17:18) (75 - 85)  BP: 118/68 (06 Mar 2020 17:18) (101/61 - 131/73)  BP(mean): --  RR: 16 (06 Mar 2020 17:18) (16 - 18)  SpO2: 99% (06 Mar 2020 17:18) (98% - 100%)    PHYSICAL EXAM:    Constitutional: NAD, well-developed  HEENT: ROGE, EOMI, Normal Hearing, MMM  Neck: No LAD, No JVD  Back: Normal spine flexure, No CVA tenderness  Respiratory: CTAB   Cardiovascular: S1 and S2, RRR, no M/G/R  Abd: BS+, soft, NT/ND, No CVAT  : Bladder fllor prolpase  Extremities: No peripheral edema  Vascular: 2+ peripheral pulses  Neurological: A/O x 3, no focal deficits  Psychiatric: Normal mood, normal affect  Musculoskeletal: 5/5 strength b/l upper and lower extremities  Skin: No rashes    LABS:                        11.2   18.22 )-----------( 315      ( 06 Mar 2020 10:01 )             34.6     03-06    135  |  107  |  34<H>  ----------------------------<  115<H>  3.5   |  19<L>  |  3.11<H>    Ca    9.0      06 Mar 2020 10:01    TPro  x   /  Alb  2.7<L>  /  TBili  x   /  DBili  x   /  AST  x   /  ALT  x   /  AlkPhos  x   03-06    PT/INR - ( 06 Mar 2020 10:01 )   PT: 13.3 sec;   INR: 1.19 ratio         PTT - ( 06 Mar 2020 10:01 )  PTT:26.3 sec  Urinalysis Basic - ( 06 Mar 2020 11:55 )    Color: Yellow / Appearance: very cloudy / S.010 / pH: x  Gluc: x / Ketone: Negative  / Bili: Negative / Urobili: Negative mg/dL   Blood: x / Protein: 30 mg/dL / Nitrite: Negative   Leuk Esterase: Moderate / RBC: 6-10 /HPF / WBC 3-5   Sq Epi: x / Non Sq Epi: Occasional / Bacteria: TNTC      Urine Culture:     RADIOLOGY & ADDITIONAL STUDIES:

## 2020-03-07 ENCOUNTER — RESULT REVIEW (OUTPATIENT)
Age: 85
End: 2020-03-07

## 2020-03-07 ENCOUNTER — TRANSCRIPTION ENCOUNTER (OUTPATIENT)
Age: 85
End: 2020-03-07

## 2020-03-07 DIAGNOSIS — N13.39 OTHER HYDRONEPHROSIS: ICD-10-CM

## 2020-03-07 LAB
24R-OH-CALCIDIOL SERPL-MCNC: 22.6 NG/ML — LOW (ref 30–80)
ANION GAP SERPL CALC-SCNC: 8 MMOL/L — SIGNIFICANT CHANGE UP (ref 5–17)
ANION GAP SERPL CALC-SCNC: 9 MMOL/L — SIGNIFICANT CHANGE UP (ref 5–17)
APTT BLD: 26.8 SEC — LOW (ref 27.5–36.3)
BUN SERPL-MCNC: 31 MG/DL — HIGH (ref 7–23)
BUN SERPL-MCNC: 34 MG/DL — HIGH (ref 7–23)
CALCIUM SERPL-MCNC: 8.5 MG/DL — SIGNIFICANT CHANGE UP (ref 8.5–10.1)
CALCIUM SERPL-MCNC: 8.6 MG/DL — SIGNIFICANT CHANGE UP (ref 8.5–10.1)
CHLORIDE SERPL-SCNC: 112 MMOL/L — HIGH (ref 96–108)
CHLORIDE SERPL-SCNC: 113 MMOL/L — HIGH (ref 96–108)
CO2 SERPL-SCNC: 20 MMOL/L — LOW (ref 22–31)
CO2 SERPL-SCNC: 20 MMOL/L — LOW (ref 22–31)
CREAT SERPL-MCNC: 2.42 MG/DL — HIGH (ref 0.5–1.3)
CREAT SERPL-MCNC: 2.56 MG/DL — HIGH (ref 0.5–1.3)
GLUCOSE SERPL-MCNC: 120 MG/DL — HIGH (ref 70–99)
GLUCOSE SERPL-MCNC: 98 MG/DL — SIGNIFICANT CHANGE UP (ref 70–99)
HCT VFR BLD CALC: 31.2 % — LOW (ref 34.5–45)
HCT VFR BLD CALC: 32.2 % — LOW (ref 34.5–45)
HGB BLD-MCNC: 10 G/DL — LOW (ref 11.5–15.5)
HGB BLD-MCNC: 9.7 G/DL — LOW (ref 11.5–15.5)
INR BLD: 1.17 RATIO — HIGH (ref 0.88–1.16)
MCHC RBC-ENTMCNC: 27.5 PG — SIGNIFICANT CHANGE UP (ref 27–34)
MCHC RBC-ENTMCNC: 27.9 PG — SIGNIFICANT CHANGE UP (ref 27–34)
MCHC RBC-ENTMCNC: 31.1 GM/DL — LOW (ref 32–36)
MCHC RBC-ENTMCNC: 31.1 GM/DL — LOW (ref 32–36)
MCV RBC AUTO: 88.5 FL — SIGNIFICANT CHANGE UP (ref 80–100)
MCV RBC AUTO: 89.7 FL — SIGNIFICANT CHANGE UP (ref 80–100)
PLATELET # BLD AUTO: 290 K/UL — SIGNIFICANT CHANGE UP (ref 150–400)
PLATELET # BLD AUTO: 299 K/UL — SIGNIFICANT CHANGE UP (ref 150–400)
POTASSIUM SERPL-MCNC: 3.7 MMOL/L — SIGNIFICANT CHANGE UP (ref 3.5–5.3)
POTASSIUM SERPL-MCNC: 4 MMOL/L — SIGNIFICANT CHANGE UP (ref 3.5–5.3)
POTASSIUM SERPL-SCNC: 3.7 MMOL/L — SIGNIFICANT CHANGE UP (ref 3.5–5.3)
POTASSIUM SERPL-SCNC: 4 MMOL/L — SIGNIFICANT CHANGE UP (ref 3.5–5.3)
PROTHROM AB SERPL-ACNC: 13 SEC — HIGH (ref 10–12.9)
RBC # BLD: 3.48 M/UL — LOW (ref 3.8–5.2)
RBC # BLD: 3.64 M/UL — LOW (ref 3.8–5.2)
RBC # FLD: 13.2 % — SIGNIFICANT CHANGE UP (ref 10.3–14.5)
RBC # FLD: 13.2 % — SIGNIFICANT CHANGE UP (ref 10.3–14.5)
SODIUM SERPL-SCNC: 141 MMOL/L — SIGNIFICANT CHANGE UP (ref 135–145)
SODIUM SERPL-SCNC: 141 MMOL/L — SIGNIFICANT CHANGE UP (ref 135–145)
WBC # BLD: 10.77 K/UL — HIGH (ref 3.8–10.5)
WBC # BLD: 14.28 K/UL — HIGH (ref 3.8–10.5)
WBC # FLD AUTO: 10.77 K/UL — HIGH (ref 3.8–10.5)
WBC # FLD AUTO: 14.28 K/UL — HIGH (ref 3.8–10.5)

## 2020-03-07 PROCEDURE — 99232 SBSQ HOSP IP/OBS MODERATE 35: CPT

## 2020-03-07 PROCEDURE — 88305 TISSUE EXAM BY PATHOLOGIST: CPT | Mod: 26

## 2020-03-07 PROCEDURE — 93306 TTE W/DOPPLER COMPLETE: CPT | Mod: 26

## 2020-03-07 PROCEDURE — 73501 X-RAY EXAM HIP UNI 1 VIEW: CPT | Mod: 26,RT

## 2020-03-07 PROCEDURE — 88311 DECALCIFY TISSUE: CPT | Mod: 26

## 2020-03-07 RX ORDER — OXYCODONE HYDROCHLORIDE 5 MG/1
5 TABLET ORAL EVERY 4 HOURS
Refills: 0 | Status: DISCONTINUED | OUTPATIENT
Start: 2020-03-07 | End: 2020-03-10

## 2020-03-07 RX ORDER — HYDROMORPHONE HYDROCHLORIDE 2 MG/ML
0.5 INJECTION INTRAMUSCULAR; INTRAVENOUS; SUBCUTANEOUS
Refills: 0 | Status: DISCONTINUED | OUTPATIENT
Start: 2020-03-07 | End: 2020-03-07

## 2020-03-07 RX ORDER — SODIUM CHLORIDE 9 MG/ML
1000 INJECTION, SOLUTION INTRAVENOUS
Refills: 0 | Status: DISCONTINUED | OUTPATIENT
Start: 2020-03-07 | End: 2020-03-09

## 2020-03-07 RX ORDER — SODIUM CHLORIDE 9 MG/ML
1000 INJECTION, SOLUTION INTRAVENOUS
Refills: 0 | Status: DISCONTINUED | OUTPATIENT
Start: 2020-03-07 | End: 2020-03-07

## 2020-03-07 RX ORDER — SENNA PLUS 8.6 MG/1
2 TABLET ORAL AT BEDTIME
Refills: 0 | Status: DISCONTINUED | OUTPATIENT
Start: 2020-03-07 | End: 2020-03-10

## 2020-03-07 RX ORDER — HYDROMORPHONE HYDROCHLORIDE 2 MG/ML
0.5 INJECTION INTRAMUSCULAR; INTRAVENOUS; SUBCUTANEOUS EVERY 4 HOURS
Refills: 0 | Status: DISCONTINUED | OUTPATIENT
Start: 2020-03-07 | End: 2020-03-10

## 2020-03-07 RX ORDER — ONDANSETRON 8 MG/1
4 TABLET, FILM COATED ORAL ONCE
Refills: 0 | Status: COMPLETED | OUTPATIENT
Start: 2020-03-07 | End: 2020-03-07

## 2020-03-07 RX ORDER — POLYETHYLENE GLYCOL 3350 17 G/17G
17 POWDER, FOR SOLUTION ORAL DAILY
Refills: 0 | Status: DISCONTINUED | OUTPATIENT
Start: 2020-03-07 | End: 2020-03-10

## 2020-03-07 RX ORDER — ENOXAPARIN SODIUM 100 MG/ML
30 INJECTION SUBCUTANEOUS EVERY 24 HOURS
Refills: 0 | Status: DISCONTINUED | OUTPATIENT
Start: 2020-03-08 | End: 2020-03-08

## 2020-03-07 RX ORDER — ONDANSETRON 8 MG/1
4 TABLET, FILM COATED ORAL EVERY 6 HOURS
Refills: 0 | Status: DISCONTINUED | OUTPATIENT
Start: 2020-03-07 | End: 2020-03-10

## 2020-03-07 RX ORDER — OXYCODONE HYDROCHLORIDE 5 MG/1
2.5 TABLET ORAL EVERY 4 HOURS
Refills: 0 | Status: DISCONTINUED | OUTPATIENT
Start: 2020-03-07 | End: 2020-03-10

## 2020-03-07 RX ORDER — ACETAMINOPHEN 500 MG
650 TABLET ORAL EVERY 6 HOURS
Refills: 0 | Status: DISCONTINUED | OUTPATIENT
Start: 2020-03-07 | End: 2020-03-10

## 2020-03-07 RX ORDER — PROCHLORPERAZINE MALEATE 5 MG
10 TABLET ORAL ONCE
Refills: 0 | Status: COMPLETED | OUTPATIENT
Start: 2020-03-07 | End: 2020-03-07

## 2020-03-07 RX ORDER — MAGNESIUM HYDROXIDE 400 MG/1
30 TABLET, CHEWABLE ORAL THREE TIMES A DAY
Refills: 0 | Status: DISCONTINUED | OUTPATIENT
Start: 2020-03-07 | End: 2020-03-10

## 2020-03-07 RX ORDER — OXYCODONE HYDROCHLORIDE 5 MG/1
5 TABLET ORAL ONCE
Refills: 0 | Status: DISCONTINUED | OUTPATIENT
Start: 2020-03-07 | End: 2020-03-07

## 2020-03-07 RX ORDER — CEFAZOLIN SODIUM 1 G
2000 VIAL (EA) INJECTION EVERY 8 HOURS
Refills: 0 | Status: COMPLETED | OUTPATIENT
Start: 2020-03-07 | End: 2020-03-08

## 2020-03-07 RX ORDER — MEPERIDINE HYDROCHLORIDE 50 MG/ML
12.5 INJECTION INTRAMUSCULAR; INTRAVENOUS; SUBCUTANEOUS
Refills: 0 | Status: DISCONTINUED | OUTPATIENT
Start: 2020-03-07 | End: 2020-03-07

## 2020-03-07 RX ADMIN — SODIUM CHLORIDE 100 MILLILITER(S): 9 INJECTION, SOLUTION INTRAVENOUS at 15:26

## 2020-03-07 RX ADMIN — TRAMADOL HYDROCHLORIDE 50 MILLIGRAM(S): 50 TABLET ORAL at 06:45

## 2020-03-07 RX ADMIN — SODIUM CHLORIDE 75 MILLILITER(S): 9 INJECTION, SOLUTION INTRAVENOUS at 01:31

## 2020-03-07 RX ADMIN — Medication 100 MILLIGRAM(S): at 21:03

## 2020-03-07 RX ADMIN — ONDANSETRON 4 MILLIGRAM(S): 8 TABLET, FILM COATED ORAL at 15:07

## 2020-03-07 RX ADMIN — TRAMADOL HYDROCHLORIDE 50 MILLIGRAM(S): 50 TABLET ORAL at 07:15

## 2020-03-07 RX ADMIN — SODIUM CHLORIDE 125 MILLILITER(S): 9 INJECTION INTRAMUSCULAR; INTRAVENOUS; SUBCUTANEOUS at 01:30

## 2020-03-07 RX ADMIN — Medication 10 MILLIGRAM(S): at 15:15

## 2020-03-07 NOTE — DISCHARGE NOTE PROVIDER - HOSPITAL COURSE
Patient presented s/p fall with RT hip fracture, underwent RT THR. Required Rosario placement- will be discharged with Rosario for outpatient f/u

## 2020-03-07 NOTE — DISCHARGE NOTE PROVIDER - NSDCFUADDINST_GEN_ALL_CORE_FT
Discharge Instructions  Total hip iarthroplasty    1. Diet: Resume previous diet  2. Activity: WBAT. Rolling walker. Posterior Hip Dislocation Precautions. Abduction Pillow while in bed for 6 weeks. Daily Physical Therapy. Rolling walker.  3. Call with: fever over 101, wound redness, drainage or open area, calf pain/calf swelling.  4. Wound Care: Remove old and Place new Aquacel bandage to hip wound in 7days. If Aquacel not available use Tegaderm and dry gauze (cannot shower with this). No bandage needed after staple removal.  5. RN to Remove Staples Post Op Day #14 (3/21/20)so long as wound is healed, no drainage or open area.   6. OK to Shower with Aquacel.  Avoid direct water beating on bandage.   7. DVT PE Prophylaxis: see med rec for details  8. Labs: Check H&H weekly while on Anticoagulation  9. Follow Up: Orthopedics, 2 weeks in office. Call to schedule.

## 2020-03-07 NOTE — PROGRESS NOTE ADULT - SUBJECTIVE AND OBJECTIVE BOX
Patient seen and examined at bedside. No acute events over night. No acute complaints at this time. Pain well controlled with medications. Denies chest pain, shortness of breath, nausea or vomiting.     PE:  Vital Signs Last 24 Hrs  T(C): 36.6 (03-06-20 @ 22:00), Max: 36.9 (03-06-20 @ 09:48)  T(F): 97.8 (03-06-20 @ 22:00), Max: 98.4 (03-06-20 @ 09:48)  HR: 75 (03-06-20 @ 22:00) (75 - 85)  BP: 107/70 (03-06-20 @ 22:00) (101/61 - 131/73)  BP(mean): --  RR: 16 (03-06-20 @ 22:00) (16 - 18)  SpO2: 100% (03-06-20 @ 22:00) (98% - 100%)    General: NAD, resting comfortably in bed  R LE:   SCDs present bilaterally  Compartments soft and compressible  No calf tenderness bilaterally  +TA/EHL/FHL/GSC  SILT L3-S1  2+ DP/PT                          11.2   18.22 )-----------( 315      ( 06 Mar 2020 10:01 )             34.6     06 Mar 2020 10:01    135    |  107    |  34     ----------------------------<  115    3.5     |  19     |  3.11     Ca    9.0        06 Mar 2020 10:01    TPro  x      /  Alb  2.7    /  TBili  x      /  DBili  x      /  AST  x      /  ALT  x      /  AlkPhos  x      06 Mar 2020 16:38    PT/INR - ( 06 Mar 2020 10:01 )   PT: 13.3 sec;   INR: 1.19 ratio         PTT - ( 06 Mar 2020 10:01 )  PTT:26.3 sec    A/P:  84y f with right femoral neck fracutre  -PT/OT - NWB RLE  -Pain Control  -DVT ppx on hold  -NPO/IVF  -FU AM Labs  -Rest, ice, compress and elevate the extremity as we needed  -Incentive Spirometry  -Medical management appreciated

## 2020-03-07 NOTE — PROGRESS NOTE ADULT - SUBJECTIVE AND OBJECTIVE BOX
c/c: fall , right hip fracture    HPI: 84F, h/o bladder prolapse, does not follow regularly with physicians who presented to the ER with inability to ambulate after a fall. She hadn't been feeling well for the past few days with rhinitis. She got up to let her dog out, felt dizzy and fell the night prior to admission. She wasn't able to get up on her own. She dragged herself to the den where her daughter found her around 11pm last night. She helped her into a more comfortable position and slept there overnight. She was brought to the ER the following day and found to have RIght hip fracture. She was found to have creatinine of 3 with no prior known h/o renal disease. Sono showed b/l hydro and bladder distension. She was seen by urology and had nuñez placed. seen by cardiology, underwent echo and cleared for surgery  Underwent right hip replacement 3/7    3: pt seen and examined on 2N post op. Felt well. Surprised how well she feels. no sob/chest pain.     Review of system- All 10 systems reviewed and is as per HPI otherwise negative.       VITALS  T(C): 36.2 (20 @ 16:15), Max: 36.6 (20 @ 22:00)  HR: 68 (20 @ 16:15) (61 - 81)  BP: 100/53 (20 @ 16:15) (89/46 - 118/68)  RR: 18 (20 @ 16:15) (12 - 18)  SpO2: 100% (20 @ 16:15) (96% - 100%)      PHYSICAL EXAM:    GENERAL: Comfortable, no acute distress  HEAD:  Atraumatic, Normocephalic  EYES: EOMI, PERRLA  HEENT: Moist mucous membranes  NECK: Supple, No JVD  NERVOUS SYSTEM:  Alert & Oriented X3,non focal  CHEST/LUNG: Clear to auscultation bilaterally  HEART: Regular rate and rhythm; No murmurs, rubs, or gallops  ABDOMEN: Soft, Nontender, Nondistended; Bowel sounds present  GENITOURINARY- nuñez+  EXTREMITIES:  No clubbing, cyanosis, or edema  MUSCULOSKELTAL- right hip dressing intact.  SKIN-no rash        LABS:                        9.7    14.28 )-----------( 299      ( 07 Mar 2020 15:05 )             31.2     03-07    141  |  112<H>  |  31<H>  ----------------------------<  120<H>  3.7   |  20<L>  |  2.42<H>    Ca    8.5      07 Mar 2020 15:05    TPro  x   /  Alb  2.7<L>  /  TBili  x   /  DBili  x   /  AST  x   /  ALT  x   /  AlkPhos  x   03-06    PT/INR - ( 07 Mar 2020 08:24 )   PT: 13.0 sec;   INR: 1.17 ratio         PTT - ( 07 Mar 2020 08:24 )  PTT:26.8 sec  Urinalysis Basic - ( 06 Mar 2020 11:55 )    Color: Yellow / Appearance: very cloudy / S.010 / pH: x  Gluc: x / Ketone: Negative  / Bili: Negative / Urobili: Negative mg/dL   Blood: x / Protein: 30 mg/dL / Nitrite: Negative   Leuk Esterase: Moderate / RBC: 6-10 /HPF / WBC 3-5   Sq Epi: x / Non Sq Epi: Occasional / Bacteria: TNTC    CARDIAC MARKERS ( 06 Mar 2020 16:38 )  <0.015 ng/mL / x     / x     / x     / x      CARDIAC MARKERS ( 06 Mar 2020 10:01 )  <0.015 ng/mL / x     / 189 U/L / x     / x            MEDS  acetaminophen   Tablet .. 650 milliGRAM(s) Oral every 6 hours PRN  aluminum hydroxide/magnesium hydroxide/simethicone Suspension 30 milliLiter(s) Oral four times a day PRN  ceFAZolin   IVPB 2000 milliGRAM(s) IV Intermittent every 8 hours  HYDROmorphone  Injectable 0.5 milliGRAM(s) SubCutaneous every 4 hours PRN  lactated ringers. 1000 milliLiter(s) IV Continuous <Continuous>  magnesium hydroxide Suspension 30 milliLiter(s) Oral three times a day PRN  ondansetron Injectable 4 milliGRAM(s) IV Push every 6 hours PRN  oxyCODONE    IR 2.5 milliGRAM(s) Oral every 4 hours PRN  oxyCODONE    IR 5 milliGRAM(s) Oral every 4 hours PRN  polyethylene glycol 3350 17 Gram(s) Oral daily  senna 2 Tablet(s) Oral at bedtime PRN    ASSESSMENT AND PLAN:  83F, pmh as above a/w:    1. Fall/Right hip fracture:  -S/P right hip replacement POD#0  -pain control  -physical therapy  -incentive spirometry  -bowel regimen.     2. Leukocytosis:  -likely reactive to fall/fracture    3. FRANCIS vs CKD:  -b/l hydro on sono.  -nuñez placed per urology  -nephrology eval appreciated  -improving slowly.     4. Abnormal EKG:  -trops neg  -echo noted  -seen by cardio this am, outpt f/u    5. DVT px

## 2020-03-07 NOTE — DISCHARGE NOTE PROVIDER - NSDCCPCAREPLAN_GEN_ALL_CORE_FT
PRINCIPAL DISCHARGE DIAGNOSIS  Diagnosis: Hip fracture, left, closed, initial encounter  Assessment and Plan of Treatment:       SECONDARY DISCHARGE DIAGNOSES  Diagnosis: Urinary tract infection without hematuria, site unspecified  Assessment and Plan of Treatment:

## 2020-03-07 NOTE — DISCHARGE NOTE PROVIDER - NSDCMRMEDTOKEN_GEN_ALL_CORE_FT
aspirin 81 mg oral delayed release tablet: 1 tab(s) orally once a day  biotin 1000 mcg oral tablet: 1 tab(s) orally once a day  [facility] oral capsule: 1 cap(s) orally once a day  cyanocobalamin 1000 mcg oral tablet: 1 tab(s) orally once a day acetaminophen 325 mg oral tablet: 2 tab(s) orally every 6 hours, As needed, Temp greater or equal to 38C (100.4F), Mild Pain (1 - 3)  apixaban 2.5 mg oral tablet: 1 tab(s) orally 2 times a day   aspirin 81 mg oral delayed release tablet: 1 tab(s) orally once a day  biotin 1000 mcg oral tablet: 1 tab(s) orally once a day  cyanocobalamin 1000 mcg oral tablet: 1 tab(s) orally once a day  oxyCODONE 5 mg oral tablet: 1 tab(s) orally every 6 hours, As Needed -Moderate Pain (4 - 6) MDD:4 tabs  polyethylene glycol 3350 oral powder for reconstitution: 17 gram(s) orally once a day  senna oral tablet: 2 tab(s) orally once a day (at bedtime), As needed, Constipation

## 2020-03-07 NOTE — PROGRESS NOTE ADULT - SUBJECTIVE AND OBJECTIVE BOX
CHIEF COMPLAINT:Hydro and creatinine elevation    HISTORY OF PRESENT ILLNESS:CAT scan shows distended bladder as probable cause of hydro, creatinine has improved since placement    PAST MEDICAL & SURGICAL HISTORY:  No pertinent past medical history  No significant past surgical history      REVIEW OF SYSTEMS:    CONSTITUTIONAL: No weakness, fevers or chills  EYES/ENT: No visual changes;  No vertigo or throat pain   NECK: No pain or stiffness  RESPIRATORY: No cough, wheezing, hemoptysis; No shortness of breath  CARDIOVASCULAR: No chest pain or palpitations  GASTROINTESTINAL: No abdominal or epigastric pain. No nausea, vomiting, or hematemesis; No diarrhea or constipation. No melena or hematochezia.  GENITOURINARY: No dysuria, frequency or hematuria  NEUROLOGICAL: No numbness or weakness  SKIN: No itching, burning, rashes, or lesions   All other review of systems is negative unless indicated above.    MEDICATIONS  (STANDING):  acetaminophen   Tablet .. 975 milliGRAM(s) Oral every 8 hours  aspirin  chewable 81 milliGRAM(s) Oral daily  cyanocobalamin 1000 MICROGram(s) Oral daily  famotidine    Tablet 20 milliGRAM(s) Oral daily  lactated ringers. 1000 milliLiter(s) (100 mL/Hr) IV Continuous <Continuous>  polyethylene glycol 3350 17 Gram(s) Oral daily  senna 2 Tablet(s) Oral at bedtime    MEDICATIONS  (PRN):  HYDROmorphone  Injectable 0.5 milliGRAM(s) IV Push every 6 hours PRN Severe Pain (7 - 10)  ondansetron Injectable 4 milliGRAM(s) IV Push every 6 hours PRN Nausea and/or Vomiting  traMADol 25 milliGRAM(s) Oral every 6 hours PRN Mild Pain (1 - 3)  traMADol 50 milliGRAM(s) Oral every 6 hours PRN Moderate Pain (4 - 6)      Allergies    No Known Allergies    Intolerances        SOCIAL HISTORY:    FAMILY HISTORY:      Vital Signs Last 24 Hrs  T(C): 36.6 (06 Mar 2020 22:00), Max: 36.7 (06 Mar 2020 11:19)  T(F): 97.8 (06 Mar 2020 22:00), Max: 98.1 (06 Mar 2020 11:19)  HR: 75 (06 Mar 2020 22:00) (75 - 84)  BP: 107/70 (06 Mar 2020 22:00) (101/61 - 130/80)  BP(mean): --  RR: 16 (06 Mar 2020 22:00) (16 - 18)  SpO2: 100% (06 Mar 2020 22:00) (98% - 100%)    PHYSICAL EXAM:    Constitutional: NAD, well-developed  HEENT: ROGE, EOMI, Normal Hearing, MMM  Neck: No LAD, No JVD  Back: Normal spine flexure, No CVA tenderness  Respiratory: CTAB   Cardiovascular: S1 and S2, RRR, no M/G/R  Abd: BS+, soft, NT/ND, No CVAT  GExtremities: No peripheral edema  Vascular: 2+ peripheral pulses  Neurological: A/O x 3, no focal deficits  Psychiatric: Normal mood, normal affect  Musculoskeletal: 5/5 strength b/l upper and lower extremities  Skin: No rashes    LABS:                        10.0   10.77 )-----------( 290      ( 07 Mar 2020 08:24 )             32.2     03-07    141  |  113<H>  |  34<H>  ----------------------------<  98  4.0   |  20<L>  |  2.56<H>    Ca    8.6      07 Mar 2020 08:24    TPro  x   /  Alb  2.7<L>  /  TBili  x   /  DBili  x   /  AST  x   /  ALT  x   /  AlkPhos  x   03-06    PT/INR - ( 07 Mar 2020 08:24 )   PT: 13.0 sec;   INR: 1.17 ratio         PTT - ( 07 Mar 2020 08:24 )  PTT:26.8 sec  Urinalysis Basic - ( 06 Mar 2020 11:55 )    Color: Yellow / Appearance: very cloudy / S.010 / pH: x  Gluc: x / Ketone: Negative  / Bili: Negative / Urobili: Negative mg/dL   Blood: x / Protein: 30 mg/dL / Nitrite: Negative   Leuk Esterase: Moderate / RBC: 6-10 /HPF / WBC 3-5   Sq Epi: x / Non Sq Epi: Occasional / Bacteria: TNTC      Urine Culture:     RADIOLOGY & ADDITIONAL STUDIES:

## 2020-03-07 NOTE — PROGRESS NOTE ADULT - SUBJECTIVE AND OBJECTIVE BOX
Patient is a 84y old  Female who presents with a chief complaint of right hip fracture.       HPI:  c/c: fall , right hip fracture    HPI: 84F, h/o bladder prolapse, does not follow regularly with physicians who presented to the ER with inability to ambulate after a fall. She hadn't been feeling well for the past few days with rhinitis. She got up to let her dog out, felt dizzy and fell last night. She wasn't able to get up on her own. She dragged herself to the den where her daughter found her around 11pm last night. She helped her into a more comfortable position and slept there overnight. She was brought to the ER today and found to have RIght hip fracture. Planned for OR 3/7. She denies any medical history. No h/o MI/stroke/vascular disease. Does not really exercise. Ambulates within her home and can climb her stairs without difficulty. No recent chest pains/sob. Denies fever but did have chills. No cough.  No n/v/d. Has had poor appetite/po intake since her  passed away 5 years ago.   +weight loss, can not quantify.  Denies decreased urine outpt/dysuria/hematuria. had vague flank pain a few days ago which was self limited. Denies recent nsaid use. Takes asa 81 daily when she remembers.    3/7 Echo performed this morning revealing preserved LVEF no severe valvular disease    PMH: as above  PSH: denies  F/H: denies significant medical conditions in immediate family members  Social: denies tobacco/etoh use  Allergies: NKDA  Home meds: see med rec. (06 Mar 2020 12:55)      PAST MEDICAL & SURGICAL HISTORY:      MEDICATIONS  (STANDING):  acetaminophen   Tablet .. 975 milliGRAM(s) Oral every 8 hours  aspirin  chewable 81 milliGRAM(s) Oral daily  cyanocobalamin 1000 MICROGram(s) Oral daily  famotidine    Tablet 20 milliGRAM(s) Oral daily  heparin SubCutaneous Injection - Peds 5000 Unit(s) SubCutaneous every 8 hours  lactated ringers. 1000 milliLiter(s) (75 mL/Hr) IV Continuous <Continuous>  polyethylene glycol 3350 17 Gram(s) Oral daily  senna 2 Tablet(s) Oral at bedtime  sodium chloride 0.9%. 1000 milliLiter(s) (125 mL/Hr) IV Continuous <Continuous>    MEDICATIONS  (PRN):  HYDROmorphone  Injectable 0.5 milliGRAM(s) IV Push every 6 hours PRN Severe Pain (7 - 10)  ondansetron Injectable 4 milliGRAM(s) IV Push every 6 hours PRN Nausea and/or Vomiting  traMADol 25 milliGRAM(s) Oral every 6 hours PRN Mild Pain (1 - 3)  traMADol 50 milliGRAM(s) Oral every 6 hours PRN Moderate Pain (4 - 6)      REVIEW OF SYSTEMS:  CONSTITUTIONAL:    No fatigue, malaise, lethargy.  No fever or chills.  RESPIRATORY:  No cough.  No wheeze.  No hemoptysis.  No shortness of breath.  CARDIOVASCULAR:  No chest pains.  No palpitations. No shortness of breath, No orthopnea or PND.  GASTROINTESTINAL:  No abdominal pain.  No nausea or vomiting.    GENITOURINARY:    No hematuria.    MUSCULOSKELETAL:  c/o musculoskeletal pain.  No joint swelling.  No arthritis.  NEUROLOGICAL:  No tingling or numbness or weakness.  PSYCHIATRIC:  No confusion          Vital Signs Last 24 Hrs  T(C): 36.7 (06 Mar 2020 11:19), Max: 36.9 (06 Mar 2020 09:48)  T(F): 98.1 (06 Mar 2020 11:19), Max: 98.4 (06 Mar 2020 09:48)  HR: 83 (06 Mar 2020 12:00) (83 - 85)  BP: 117/82 (06 Mar 2020 12:00) (101/61 - 131/73)  BP(mean): --  RR: 18 (06 Mar 2020 12:00) (16 - 18)  SpO2: 100% (06 Mar 2020 12:00) (98% - 100%)    PHYSICAL EXAM-    Constitutional: elderly frail female in no acute distress      Head: Head is normocephalic and atraumatic.      Neck:  No JVD.     Cardiovascular: Regular rate and rhythm without S3, S4. No murmurs or rubs are appreciated.      Respiratory: Breath sounds are normal. No rales. No wheezing.    Abdomen: Soft, nontender, nondistended with positive bowel sounds.      Extremity: No tenderness. No  pitting edema     Neurologic: The patient is alert and oriented.      Skin: No rash, no obvious lesions noted.      Psychiatric: The patient appears to be emotionally stable.      INTERPRETATION OF TELEMETRY: SR     ECG: Sinus rythm , RBBB, T wave inversion in V2-3, III.     I&O's Detail      LABS:                        11.2   18.22 )-----------( 315      ( 06 Mar 2020 10:01 )             34.6     03-06    135  |  107  |  34<H>  ----------------------------<  115<H>  3.5   |  19<L>  |  3.11<H>    Ca    9.0      06 Mar 2020 10:01    TPro  8.3  /  Alb  3.0<L>  /  TBili  0.8  /  DBili  x   /  AST  19  /  ALT  13  /  AlkPhos  113  03-06    CARDIAC MARKERS ( 06 Mar 2020 10:01 )  <0.015 ng/mL / x     / 189 U/L / x     / x          PT/INR - ( 06 Mar 2020 10:01 )   PT: 13.3 sec;   INR: 1.19 ratio         PTT - ( 06 Mar 2020 10:01 )  PTT:26.3 sec  Urinalysis Basic - ( 06 Mar 2020 11:55 )    Color: Yellow / Appearance: very cloudy / S.010 / pH: x  Gluc: x / Ketone: Negative  / Bili: Negative / Urobili: Negative mg/dL   Blood: x / Protein: 30 mg/dL / Nitrite: Negative   Leuk Esterase: Moderate / RBC: 6-10 /HPF / WBC 3-5   Sq Epi: x / Non Sq Epi: Occasional / Bacteria: TNTC      I&O's Summary    BNP  RADIOLOGY & ADDITIONAL STUDIES:

## 2020-03-07 NOTE — DISCHARGE NOTE PROVIDER - CARE PROVIDERS DIRECT ADDRESSES
,DirectAddress_Unknown ,DirectAddress_Unknown,[email or web address],DirectAddress_Unknown,[email or web address]

## 2020-03-07 NOTE — PROGRESS NOTE ADULT - ASSESSMENT
85 yo woman with unknown medical hx admitted with Right Hip fx due to fall.  Noted for renal dysfunction with unknown hx and bilateral moderate hydronephrosis.  Kidneys are noted to be of normal echogenicity and size indicating obstruction may be more acute.  At this point, level of obstruction is unclear.  Suggest  evaluation prior to surgery to resolve obstruction.  Feel combination of obstructive uropathy with possible post op ATN would result in unacceptable loss of kidney function leading to greater complications, mary ellen when pt's baseline renal function is unknown with current GFR at <15 cc/min.  CT non-contrast to attempt to identify level of obstruction.  Will re-evaluate in am once  evaluation is done.    3/7 SY  --FRANCIS likely with some degree of CKD based on moderate bilateral renal atrophy seen on CT.    Renal parameters slightly improved with relief of obstruction --per , due to bladder floor prolapse.   Continue nuñez drainage.  --Cardiology evaluation appreciated--EF preserved.  --From renal standpoint, no contraindication for surgery.  Continue LR/IVF.

## 2020-03-07 NOTE — DISCHARGE NOTE PROVIDER - PROVIDER TOKENS
PROVIDER:[TOKEN:[[id number]],FOLLOWUP:[2 weeks]] PROVIDER:[TOKEN:[[id number]],FOLLOWUP:[2 weeks]],PROVIDER:[TOKEN:[[id number]],FOLLOWUP:[1 week]],PROVIDER:[TOKEN:[[id number]],FOLLOWUP:[2 weeks]],PROVIDER:[TOKEN:[[id number]],FOLLOWUP:[2 weeks]]

## 2020-03-07 NOTE — DISCHARGE NOTE PROVIDER - CARE PROVIDER_API CALL
Alexis Holm (DO)  Orthopaedic Surgery  [address] [place] [address]  Phone: [phone]  Fax: [phone]  Follow Up Time: 2 weeks Alexis Holm ()  Orthopaedic Surgery  [address] [place] [address]  Phone: [phone]  Fax: [phone]  Follow Up Time: 2 weeks    Calixto Martinez)  Urology  [address] [facility], [address] [place] [address]  Phone: [phone]  Fax: [phone]  Follow Up Time: 1 week    Yun, SukHyeon (MD)  Nephrology  [address] [facility], [address]  [place] [address]  Phone: [phone]  Fax: [phone]  Follow Up Time: 2 weeks    Mary Ferro)  Cardiovascular Disease; Interventional Cardiology  [address] [place] [address]  Phone: [phone]  Fax: [phone]  Follow Up Time: 2 weeks

## 2020-03-07 NOTE — PHARMACOTHERAPY INTERVENTION NOTE - COMMENTS
Paper order received for IV tranexamic acid to be given during hip surgery. Patient has a contraindication to IV tranexamic acid (SCr = 3.11). Pharmacist recommended topical tranexamic acid instead.

## 2020-03-07 NOTE — PROGRESS NOTE ADULT - ASSESSMENT
Preoperative cardiac evaluation - Pt was noted to have abnormal ekg.  2D with preserved LVEF and no severe valvular disease  Optimized for surgery  Other medical issues- Management per primary team.   Thank you for allowing me to participate in the care of this patient. Please feel free to contact me with any questions.

## 2020-03-07 NOTE — PROGRESS NOTE ADULT - SUBJECTIVE AND OBJECTIVE BOX
NEPHROLOGY INTERVAL HPI/OVERNIGHT EVENTS:    Date of Service: 20 @ 09:04  3/ SY  No acute events overnight.  Feeling fairly comfortable  Cardiology and  evaluation appreciated.  Rosario placed with good urine output.    HPI:  85 yo woman with unclear PMHX and no medical follow up for several years except for GYN and urgent care centers fell at home due to dizzyness yesterday.  Daughter reports pt felt well on Tuesday, 3/3 and had an active day followed by fatigue and then nausea.  Pt and daughter are adamant that this was due to post nasal drip.  Denies vomiting, diarrhea or abdominal pain.  However, pt had virtually no po intake for 2 1/2 days and when she got up yesterday to let her dog out, she felt dizzy and fell.  Daughter reports pt has had significant weight loss and very decreased po intake since losing her  5 years ago.  However, she has not had any evaluation for this.  On admission, noted with Bun/creat =34/3.11 and more problematically bilateral Hydronephrosis.    Pt reports she was told of "prolapsed bladder" by her GYN but has not had  evaluation.  Pt is tentatively scheduled for OR in am.  Pt is on no meds.  Very seldom takes NSAIDS for arthritis.    PMHX and PSHX.  Unknown.  Unclear hx of Prolapsed Bladder  Arthritis.      MEDICATIONS  (STANDING):  acetaminophen   Tablet .. 975 milliGRAM(s) Oral every 8 hours  aspirin  chewable 81 milliGRAM(s) Oral daily  cyanocobalamin 1000 MICROGram(s) Oral daily  famotidine    Tablet 20 milliGRAM(s) Oral daily  lactated ringers. 1000 milliLiter(s) (75 mL/Hr) IV Continuous <Continuous>  polyethylene glycol 3350 17 Gram(s) Oral daily  senna 2 Tablet(s) Oral at bedtime  sodium chloride 0.9%. 1000 milliLiter(s) (125 mL/Hr) IV Continuous <Continuous>    MEDICATIONS  (PRN):  HYDROmorphone  Injectable 0.5 milliGRAM(s) IV Push every 6 hours PRN Severe Pain (7 - 10)  ondansetron Injectable 4 milliGRAM(s) IV Push every 6 hours PRN Nausea and/or Vomiting  traMADol 25 milliGRAM(s) Oral every 6 hours PRN Mild Pain (1 - 3)  traMADol 50 milliGRAM(s) Oral every 6 hours PRN Moderate Pain (4 - 6)    Vital Signs Last 24 Hrs  T(C): 36.6 (06 Mar 2020 22:00), Max: 36.9 (06 Mar 2020 09:48)  T(F): 97.8 (06 Mar 2020 22:00), Max: 98.4 (06 Mar 2020 09:48)  HR: 75 (06 Mar 2020 22:00) (75 - 85)  BP: 107/70 (06 Mar 2020 22:00) (101/61 - 131/73)  BP(mean): --  RR: 16 (06 Mar 2020 22:00) (16 - 18)  SpO2: 100% (06 Mar 2020 22:00) (98% - 100%)  Daily Height in cm: 162.56 (06 Mar 2020 09:48)    Daily Weight in k.1 (06 Mar 2020 17:18)    03-06 @ 07:01  -  03-07 @ 07:00  --------------------------------------------------------  IN: 0 mL / OUT: 1400 mL / NET: -1400 mL    PHYSICAL EXAM:  Alert and comfortable  GENERAL: no distress  CHEST/LUNG: Clear to aus  HEART: S1S2 RRR  ABDOMEN: soft  EXTREMITIES: no edema  SKIN:     LABS:                        10.0   10.77 )-----------( 290      ( 07 Mar 2020 08:24 )             32.2     03-07    141  |  113<H>  |  34<H>  ----------------------------<  98  4.0   |  20<L>  |  2.56<H>    Ca    8.6      07 Mar 2020 08:24    TPro  x   /  Alb  2.7<L>  /  TBili  x   /  DBili  x   /  AST  x   /  ALT  x   /  AlkPhos  x   03-06    PT/INR - ( 07 Mar 2020 08:24 )   PT: 13.0 sec;   INR: 1.17 ratio         PTT - ( 07 Mar 2020 08:24 )  PTT:26.8 sec  Urinalysis Basic - ( 06 Mar 2020 11:55 )    Color: Yellow / Appearance: very cloudy / S.010 / pH: x  Gluc: x / Ketone: Negative  / Bili: Negative / Urobili: Negative mg/dL   Blood: x / Protein: 30 mg/dL / Nitrite: Negative   Leuk Esterase: Moderate / RBC: 6-10 /HPF / WBC 3-5   Sq Epi: x / Non Sq Epi: Occasional / Bacteria: TNTC      Vitamin D, 25-Hydroxy: 22.6 ng/mL ( @ 16:38)    RADIOLOGY & ADDITIONAL TESTS:    < from: CT Abdomen and Pelvis No Cont (20 @ 20:30) >  EXAM:  CT ABDOMEN AND PELVIS                          EXAM:  CT CHEST                            PROCEDURE DATE:  2020          INTERPRETATION:  CLINICAL INFORMATION: Hip fracture. Dilated aorta on chest x-ray    COMPARISON: None.    PROCEDURE:   CT of the Chest, Abdomen and Pelvis was performed without intravenous contrast.   Intravenous contrast: None.  Oral contrast: None.  Sagittal and coronal reformats were performed.    FINDINGS:    CHEST:     LUNGS AND LARGE AIRWAYS: Patent centralairways. No pulmonary nodules.  PLEURA: No pleural effusion.  VESSELS: Atherosclerotic arterial calcifications, including calcifications of the coronary arteries. Normal caliber aorta.  HEART: Heart size is normal. No pericardial effusion.  MEDIASTINUM AND BREONNA: No lymphadenopathy.  CHEST WALL AND LOWER NECK: Within normal limits.    ABDOMEN AND PELVIS:    LIVER: Hepatic cyst.  BILE DUCTS: Normal caliber.  GALLBLADDER: Distended gallbladder containing multiple gallstones, including a 2.1 cm calcified stone at the gallbladder neck. No pericholecystic fat stranding.  SPLEEN: Within normal limits.  PANCREAS: Within normal limits.  ADRENALS: Within normal limits.  KIDNEYS/URETERS: Moderately atrophic and scarred. Marked bilateral hydroureteronephrosis which can be traced to the level of the ureterovesical junction. No obstructing stone or mass identified.    BLADDER: Within normal limits.  REPRODUCTIVE ORGANS: Uterus and adnexa within normal limits.    BOWEL: No bowel obstruction. Appendix normal. Colonic diverticulosis.  PERITONEUM: No ascites.  VESSELS: Atherosclerotic changes.  RETROPERITONEUM/LYMPH NODES: No lymphadenopathy.    ABDOMINAL WALL: Within normal limits.  BONES: Acute left femoral neck fracture    IMPRESSION:     No aortic aneurysm    Severe bilateral hydroureteronephrosis with both ureters dilated down to the ureterovesical junctions. The etiology of this finding is not seen. Moderate bilateral renal atrophy.    Distended gallbladder containing multiple stones. No evidence of acute cholecystitis.        CLARIBEL DUDLEY M.D.,ATTENDING RADIOLOGIST  This document has been electronically signed. Mar  6 2020  8:21PM              < end of copied text >

## 2020-03-08 LAB
ALBUMIN SERPL ELPH-MCNC: 2 G/DL — LOW (ref 3.3–5)
ANION GAP SERPL CALC-SCNC: 8 MMOL/L — SIGNIFICANT CHANGE UP (ref 5–17)
BASOPHILS # BLD AUTO: 0.06 K/UL — SIGNIFICANT CHANGE UP (ref 0–0.2)
BASOPHILS NFR BLD AUTO: 0.7 % — SIGNIFICANT CHANGE UP (ref 0–2)
BUN SERPL-MCNC: 27 MG/DL — HIGH (ref 7–23)
CALCIUM SERPL-MCNC: 7.9 MG/DL — LOW (ref 8.5–10.1)
CHLORIDE SERPL-SCNC: 108 MMOL/L — SIGNIFICANT CHANGE UP (ref 96–108)
CO2 SERPL-SCNC: 21 MMOL/L — LOW (ref 22–31)
CREAT SERPL-MCNC: 2.28 MG/DL — HIGH (ref 0.5–1.3)
EOSINOPHIL # BLD AUTO: 0.23 K/UL — SIGNIFICANT CHANGE UP (ref 0–0.5)
EOSINOPHIL NFR BLD AUTO: 2.5 % — SIGNIFICANT CHANGE UP (ref 0–6)
GLUCOSE SERPL-MCNC: 88 MG/DL — SIGNIFICANT CHANGE UP (ref 70–99)
HCT VFR BLD CALC: 27.2 % — LOW (ref 34.5–45)
HGB BLD-MCNC: 8.2 G/DL — LOW (ref 11.5–15.5)
IMM GRANULOCYTES NFR BLD AUTO: 0.6 % — SIGNIFICANT CHANGE UP (ref 0–1.5)
LYMPHOCYTES # BLD AUTO: 0.85 K/UL — LOW (ref 1–3.3)
LYMPHOCYTES # BLD AUTO: 9.4 % — LOW (ref 13–44)
MCHC RBC-ENTMCNC: 27.1 PG — SIGNIFICANT CHANGE UP (ref 27–34)
MCHC RBC-ENTMCNC: 30.1 GM/DL — LOW (ref 32–36)
MCV RBC AUTO: 89.8 FL — SIGNIFICANT CHANGE UP (ref 80–100)
MONOCYTES # BLD AUTO: 0.59 K/UL — SIGNIFICANT CHANGE UP (ref 0–0.9)
MONOCYTES NFR BLD AUTO: 6.5 % — SIGNIFICANT CHANGE UP (ref 2–14)
NEUTROPHILS # BLD AUTO: 7.25 K/UL — SIGNIFICANT CHANGE UP (ref 1.8–7.4)
NEUTROPHILS NFR BLD AUTO: 80.3 % — HIGH (ref 43–77)
PHOSPHATE SERPL-MCNC: 3.5 MG/DL — SIGNIFICANT CHANGE UP (ref 2.5–4.5)
PLATELET # BLD AUTO: 278 K/UL — SIGNIFICANT CHANGE UP (ref 150–400)
POTASSIUM SERPL-MCNC: 3.8 MMOL/L — SIGNIFICANT CHANGE UP (ref 3.5–5.3)
POTASSIUM SERPL-SCNC: 3.8 MMOL/L — SIGNIFICANT CHANGE UP (ref 3.5–5.3)
RBC # BLD: 3.03 M/UL — LOW (ref 3.8–5.2)
RBC # FLD: 13.2 % — SIGNIFICANT CHANGE UP (ref 10.3–14.5)
SODIUM SERPL-SCNC: 137 MMOL/L — SIGNIFICANT CHANGE UP (ref 135–145)
WBC # BLD: 9.03 K/UL — SIGNIFICANT CHANGE UP (ref 3.8–10.5)
WBC # FLD AUTO: 9.03 K/UL — SIGNIFICANT CHANGE UP (ref 3.8–10.5)

## 2020-03-08 PROCEDURE — 99232 SBSQ HOSP IP/OBS MODERATE 35: CPT

## 2020-03-08 PROCEDURE — 99223 1ST HOSP IP/OBS HIGH 75: CPT

## 2020-03-08 RX ORDER — APIXABAN 2.5 MG/1
2.5 TABLET, FILM COATED ORAL
Refills: 0 | Status: DISCONTINUED | OUTPATIENT
Start: 2020-03-08 | End: 2020-03-10

## 2020-03-08 RX ADMIN — APIXABAN 2.5 MILLIGRAM(S): 2.5 TABLET, FILM COATED ORAL at 18:37

## 2020-03-08 RX ADMIN — Medication 650 MILLIGRAM(S): at 22:30

## 2020-03-08 RX ADMIN — Medication 100 MILLIGRAM(S): at 06:30

## 2020-03-08 RX ADMIN — Medication 650 MILLIGRAM(S): at 23:00

## 2020-03-08 RX ADMIN — ENOXAPARIN SODIUM 30 MILLIGRAM(S): 100 INJECTION SUBCUTANEOUS at 06:31

## 2020-03-08 NOTE — CONSULT NOTE ADULT - SUBJECTIVE AND OBJECTIVE BOX
HPI: 84F, h/o bladder prolapse, does not follow regularly with physicians who presented to the ER with inability to ambulate after a fall. She hadn't been feeling well for the past few days with rhinitis. She got up to let her dog out, felt dizzy and fell last night. She wasn't able to get up on her own. She dragged herself to the den where her daughter found her around 11pm last night. She helped her into a more comfortable position and slept there overnight. She was brought to the ER today and found to have RIght hip fracture. Planned for OR 3/7. She denies any medical history. No h/o MI/stroke/vascular disease. Does not really exercise. Ambulates within her home and can climb her stairs without difficulty. No recent chest pains/sob. Denies fever but did have chills. No cough.  No n/v/d. Has had poor appetite/po intake since her  passed away 5 years ago.   +weight loss, can not quantify.  Denies decreased urine outpt/dysuria/hematuria. had vague flank pain a few days ago which was self limited. Denies recent nsaid use. Takes asa 81 daily when she remembers.    3/8/2020: Pt seen at bedside on 2N. POD #1 s/p right THR per ortho team. We discussed VTE risk factors, and VTE ppx medications. Patient denies prior hx of VTE, no blood clotting disorder in family. She is bladder prolapse, and is aware of her elevated SCr today of 2.28. CrCl of 19.55. We discussed options of Eliquis low dosing vs. Coumadin, its risks/benefits.    Patient is a 84y old  Female who presents with a chief complaint of right hip fracture (08 Mar 2020 11:23)    Consulted by Dr. Holm for VTE prophylaxis, risk stratification, and anticoagulation management.    PAST MEDICAL & SURGICAL HISTORY:  No pertinent past medical history  No significant past surgical history    BMI: 24.6    CrCL: 19.55    CAPRINI SCORE  AGE RELATED RISK FACTORS                                                       MOBILITY RELATED FACTORS  [ ] Age 41-60 years                                            (1 Point)                  [ ] Bed rest                                                        (1 Point)  [ ] Age: 61-74 years                                           (2 Points)                [ ] Plaster cast                                                   (2 Points)  [X ] Age= 75 years                                              (3 Points)                 [ ] Bed bound for more than 72 hours                   (2 Points)    DISEASE RELATED RISK FACTORS                                               GENDER SPECIFIC FACTORS  [ ] Edema in the lower extremities                       (1 Point)           [ ] Pregnancy                                                            (1 Point)  [ ] Varicose veins                                               (1 Point)                  [ ] Post-partum < 6 weeks                                      (1 Point)             [ ] BMI > 25 Kg/m2                                            (1 Point)                  [ ] Hormonal therapy or oral contraception       (1 Point)                 [ ] Sepsis (in the previous month)                        (1 Point)             [ ] History of pregnancy complications                (1Point)  [ ] Pneumonia or serious lung disease                                             [ ] Unexplained or recurrent  (=3), premature                                 (In the previous month)                               (1 Point)                birth with toxemia or growth-restricted infant (1 Point)  [ ] Abnormal pulmonary function test            (1 Point)                                   SURGERY RELATED RISK FACTORS  [ ] Acute myocardial infarction                       (1 Point)                  [ ]  Section                                         (1 Point)  [ ] Congestive heart failure (in the previous month) (1 Point)   [ ] Minor surgery   lasting <45 minutes       (1 Point)   [ ] Inflammatory bowel disease                             (1 Point)          [ ] Arthroscopic surgery                                  (2 Points)  [ ] Central venous access                                    (2 Points)            [ ] General surgery lasting >45 minutes      (2 Points)       [ ] Stroke (in the previous month)                  (5 Points)            [ ] Elective major lower extremity arthroplasty (5 Points)                                   [  ] Malignancy (present or past include skin melanoma                                          but exclude  basal skin cell)    (2 points)                                      TRAUMA RELATED RISK FACTORS                HEMATOLOGY RELATED FACTORS                                  [X ] Fracture of the hip, pelvis, or leg                       (5 Points)  [ ] Prior episodes of VTE                                     (3 Points)          [ ] Acute spinal cord injury (in the previous month)  (5 Points)  [ ] Positive family history for VTE                         (3 Points)       [ ] Paralysis (less than 1 month)                          (5 Points)  [ ] Prothrombin 60076 A                                      (3 Points)         [ ] Multiple Trauma (within 1month)                 (5Points)                                                                                                                                                                [ ] Factor V Leiden                                          (3 Points)                                OTHER RISK FACTORS                          [ ] Lupus anticoagulants                                     (3 Points)                       [ ] BMI > 40                          (1 Point)                                                         [ ] Anticardiolipin antibodies                                (3 Points)                   [ ] Smoking                              (1Point)                                                [ ] High homocysteine in the blood                      (3 Points)                [  ] Diabetes requiring insulin (1point)                         [ ] Other congenital or acquired thrombophilia       (3 Points)          [  ] Chemotherapy                   (1 Point)  [ ] Heparin induced thrombocytopenia                  (3 Points)             [  ] Blood Transfusion                (1 point)                                                                                                         Total Score [ 8   ]                                                                                                                   IMPROVE Bleeding Risk Score: 3.5    Falls Risk:   High ( X )  Mod (  )  Low (  )    EBL: 200 ml    PROCEDURE START: 12:25  PROCEDURE END: 1435    Social: denies tobacco/etoh use    FAMILY HISTORY:  Denies any personal or familial history of clotting or bleeding disorders.    Allergies  No Known Allergies  Intolerances    REVIEW OF SYSTEMS    (  )Fever	     (  )Constipation	(  )SOB			(  )Headache	(  )Dysuria  (  )Chills	     (  )Melena	(  )Dyspnea present on exertion    (  )Dizziness                    (  )Polyuria  (  )Nausea	     (  )Hematochezia	(  )Cough		                    (  )Syncope   	(  )Hematuria  (  )Vomiting    (  )Chest Pain	(  )Wheezing		( X )Weakness  (  )Diarrhea     (  )Palpitations	(  )Anorexia		(  )Myalgia    All other review of systems negative: Yes    PHYSICAL EXAM:    Vital Signs Last 24 Hrs  T(C): 36.7 (08 Mar 2020 11:06), Max: 36.7 (07 Mar 2020 21:00)  T(F): 98 (08 Mar 2020 11:06), Max: 98.1 (07 Mar 2020 21:00)  HR: 92 (08 Mar 2020 11:06) (61 - 92)  BP: 93/47 (08 Mar 2020 11:06) (89/46 - 108/56)  BP(mean): --  RR: 16 (08 Mar 2020 11:06) (12 - 18)  SpO2: 97% (08 Mar 2020 11:06) (96% - 100%)    Constitutional: Appears Well    Neurological: A& O x 4    Skin: Warm    Respiratory and Thorax: normal effort; Breath sounds: normal; No rales/wheezing/rhonchi  	  Cardiovascular: S1, S2, regular, NMBR	    Gastrointestinal: BS + x 4Q, nontender	    Genitourinary:  Bladder nondistended, nontender    Musculoskeletal:   General Right:   no muscle/joint tenderness,   normal tone, no joint swelling,   ROM: limited	    General Left:   no muscle/joint tenderness,   normal tone, no joint swelling,   ROM: full    Hip:  Right: Dressing CDI    Lower extrems:   Right: no calf tenderness              negative kristine's sign               + pedal pulses    Left:   no calf tenderness              negative kristine's sign               + pedal pulses    MEDICATIONS  (STANDING):  apixaban 2.5 milliGRAM(s) Oral two times a day  lactated ringers. 1000 milliLiter(s) IV Continuous <Continuous>  polyethylene glycol 3350 17 Gram(s) Oral daily    LABS:                      8.2    9.03  )-----------( 278      ( 08 Mar 2020 08:06 )             27.2     03-08    137  |  108  |  27<H>  ----------------------------<  88  3.8   |  21<L>  |  2.28<H>    Ca    7.9<L>      08 Mar 2020 08:06  Phos  3.5     03-08    TPro  x   /  Alb  2.0<L>  /  TBili  x   /  DBili  x   /  AST  x   /  ALT  x   /  AlkPhos  x   03-08    PT/INR - ( 07 Mar 2020 08:24 )   PT: 13.0 sec;   INR: 1.17 ratio         PTT - ( 07 Mar 2020 08:24 )  PTT:26.8 sec  LIVER FUNCTIONS - ( 08 Mar 2020 08:06 )  Alb: 2.0 g/dL / Pro: x     / ALK PHOS: x     / ALT: x     / AST: x     / GGT: x           CARDIAC MARKERS ( 06 Mar 2020 16:38 )  <0.015 ng/mL / x     / x     / x     / x        RADIOLOGY, EKG & ADDITIONAL TESTS: Reviewed.     EXAM:  XR HIP WITH PELV 1V RT                        PROCEDURE DATE:  2020    INTERPRETATION:      HISTORY: Total hip replacement    Two views of the RIGHT hip are submitted.  Evaluation demonstrates both femoral and acetabular components well-seated and in good anatomic alignment. There is no evidence of fracture. The visualized pelvis appears within normal limits.  Impression: Hip replacement as described above.    DVT Prophylaxis:  LMWH                   (  )  Heparin SQ           (  )  Coumadin             (  )  Xarelto                  (  )  Eliquis                   ( X )  Venodynes           ( X )  Ambulation          ( X)  UFH                       (  )  Contraindicated  (  )  ASA                       (   )

## 2020-03-08 NOTE — PHYSICAL THERAPY INITIAL EVALUATION ADULT - ADDITIONAL COMMENTS
Pt. lives alone in pvt home with a lot of steps but she stated that she can go through garage to the first level with no steps and stay there. Pt. was IND with all ADL's and amb with NAD + drive prior to fall.

## 2020-03-08 NOTE — PROGRESS NOTE ADULT - SUBJECTIVE AND OBJECTIVE BOX
Patient seen and examined at bedside. No acute events over night. No acute complaints at this time. Has not ambulated with PT yet. Reports a poor night of sleep. Pain well controlled. Denies chest pain, shortness of breath, nausea or vomiting.     PE:  Vital Signs Last 24 Hrs  T(C): 36.7 (03-08-20 @ 05:21), Max: 36.7 (03-07-20 @ 21:00)  T(F): 98 (03-08-20 @ 05:21), Max: 98.1 (03-07-20 @ 21:00)  HR: 87 (03-08-20 @ 05:21) (61 - 87)  BP: 104/58 (03-08-20 @ 05:21) (89/46 - 118/66)  BP(mean): --  RR: 18 (03-08-20 @ 05:21) (12 - 18)  SpO2: 96% (03-08-20 @ 05:21) (96% - 100%)    General: NAD, resting comfortably in bed  R LE:   Dressing C/D/I  SCDs present bilaterally  Compartments soft and compressible  No calf tenderness bilaterally  +TA/EHL/FHL/GSC  SILT L3-S1  2+ DP/PT                          9.7    14.28 )-----------( 299      ( 07 Mar 2020 15:05 )             31.2     07 Mar 2020 15:05    141    |  112    |  31     ----------------------------<  120    3.7     |  20     |  2.42     Ca    8.5        07 Mar 2020 15:05    TPro  x      /  Alb  2.7    /  TBili  x      /  DBili  x      /  AST  x      /  ALT  x      /  AlkPhos  x      06 Mar 2020 16:38    PT/INR - ( 07 Mar 2020 08:24 )   PT: 13.0 sec;   INR: 1.17 ratio         PTT - ( 07 Mar 2020 08:24 )  PTT:26.8 sec    A/P:  84y f s/p R JAMESON POD 1  -PT/OT -WBAT, posterior hip precautions, abduction pilllow while sleeping  -Pain Control  -DVT ppx per AC team  -Continue perioperative abx x 24 hours  -FU AM Labs  -Rest, ice, compress and elevate the extremity as we needed  -Incentive Spirometry  -Medical management appreciated  -dispo planning

## 2020-03-08 NOTE — PHYSICAL THERAPY INITIAL EVALUATION ADULT - IMPAIRMENTS FOUND, PT EVAL
neuromotor development and sensory integration/ROM/muscle strength/posture/aerobic capacity/endurance/gait, locomotion, and balance/integumentary integrity

## 2020-03-08 NOTE — PHYSICAL THERAPY INITIAL EVALUATION ADULT - PLANNED THERAPY INTERVENTIONS, PT EVAL
balance training/stretching/gait training/neuromuscular re-education/postural re-education/ROM/transfer training/bed mobility training/strengthening

## 2020-03-08 NOTE — PHYSICAL THERAPY INITIAL EVALUATION ADULT - PERTINENT HX OF CURRENT PROBLEM, REHAB EVAL
The patient is a 84y Female s/p fall while letting dog outside . Pt. with right femoral neck fracture s/p Hip replacement with posterior Hip Protocol

## 2020-03-08 NOTE — PHYSICAL THERAPY INITIAL EVALUATION ADULT - PRECAUTIONS/LIMITATIONS, REHAB EVAL
fall precautions/right hip precautions/Total Hip Protocol, Posterior hip precautions, abduction pillow while in bed

## 2020-03-08 NOTE — PROGRESS NOTE ADULT - SUBJECTIVE AND OBJECTIVE BOX
NEPHROLOGY INTERVAL HPI/OVERNIGHT EVENTS:    Date of Service: 03-08-20 @ 17:57  3/8 SY  Post right Hip fx repair.  No complications.  Feeling well.  No new complaints.    3/7 SY  No acute events overnight.  Feeling fairly comfortable  Cardiology and  evaluation appreciated.  Rosario placed with good urine output.    HPI:  85 yo woman with unclear PMHX and no medical follow up for several years except for GYN and urgent care centers fell at home due to dizzyness yesterday.  Daughter reports pt felt well on Tuesday, 3/3 and had an active day followed by fatigue and then nausea.  Pt and daughter are adamant that this was due to post nasal drip.  Denies vomiting, diarrhea or abdominal pain.  However, pt had virtually no po intake for 2 1/2 days and when she got up yesterday to let her dog out, she felt dizzy and fell.  Daughter reports pt has had significant weight loss and very decreased po intake since losing her  5 years ago.  However, she has not had any evaluation for this.  On admission, noted with Bun/creat =34/3.11 and more problematically bilateral Hydronephrosis.    Pt reports she was told of "prolapsed bladder" by her GYN but has not had  evaluation.  Pt is tentatively scheduled for OR in am.  Pt is on no meds.  Very seldom takes NSAIDS for arthritis.    PMHX and PSHX.  Unknown.  Unclear hx of Prolapsed Bladder  Arthritis.    MEDICATIONS  (STANDING):  apixaban 2.5 milliGRAM(s) Oral two times a day  lactated ringers. 1000 milliLiter(s) (75 mL/Hr) IV Continuous <Continuous>  polyethylene glycol 3350 17 Gram(s) Oral daily    MEDICATIONS  (PRN):  acetaminophen   Tablet .. 650 milliGRAM(s) Oral every 6 hours PRN Temp greater or equal to 38C (100.4F), Mild Pain (1 - 3)  aluminum hydroxide/magnesium hydroxide/simethicone Suspension 30 milliLiter(s) Oral four times a day PRN Indigestion  HYDROmorphone  Injectable 0.5 milliGRAM(s) SubCutaneous every 4 hours PRN Severe Pain (7 - 10)  magnesium hydroxide Suspension 30 milliLiter(s) Oral three times a day PRN Constipation  ondansetron Injectable 4 milliGRAM(s) IV Push every 6 hours PRN Nausea and/or Vomiting  oxyCODONE    IR 2.5 milliGRAM(s) Oral every 4 hours PRN Mild Pain (1 - 3)  oxyCODONE    IR 5 milliGRAM(s) Oral every 4 hours PRN Moderate Pain (4 - 6)  senna 2 Tablet(s) Oral at bedtime PRN Constipation    Vital Signs Last 24 Hrs  T(C): 36.7 (08 Mar 2020 11:06), Max: 36.7 (07 Mar 2020 21:00)  T(F): 98 (08 Mar 2020 11:06), Max: 98.1 (07 Mar 2020 21:00)  HR: 92 (08 Mar 2020 11:06) (77 - 92)  BP: 93/47 (08 Mar 2020 11:06) (93/47 - 108/56)  BP(mean): --  RR: 16 (08 Mar 2020 11:06) (16 - 18)  SpO2: 97% (08 Mar 2020 11:06) (96% - 99%)  Daily     Daily     03-07 @ 06:01  -  03-08 @ 07:00  --------------------------------------------------------  IN: 1500 mL / OUT: 1325 mL / NET: 175 mL    PHYSICAL EXAM: Alert and comfortable  GENERAL: No distress  CHEST/LUNG: Clear to aus  HEART: S1S2 RRR  ABDOMEN: soft  EXTREMITIES: no edema  SKIN:     LABS:                        8.2    9.03  )-----------( 278      ( 08 Mar 2020 08:06 )             27.2     03-08    137  |  108  |  27<H>  ----------------------------<  88  3.8   |  21<L>  |  2.28<H>    Ca    7.9<L>      08 Mar 2020 08:06  Phos  3.5     03-08    TPro  x   /  Alb  2.0<L>  /  TBili  x   /  DBili  x   /  AST  x   /  ALT  x   /  AlkPhos  x   03-08    PT/INR - ( 07 Mar 2020 08:24 )   PT: 13.0 sec;   INR: 1.17 ratio         PTT - ( 07 Mar 2020 08:24 )  PTT:26.8 sec    Phosphorus Level, Serum: 3.5 mg/dL (03-08 @ 08:06)          RADIOLOGY & ADDITIONAL TESTS:

## 2020-03-08 NOTE — PROGRESS NOTE ADULT - SUBJECTIVE AND OBJECTIVE BOX
c/c: fall , right hip fracture    HPI: 84F, h/o bladder prolapse, does not follow regularly with physicians who presented to the ER with inability to ambulate after a fall. She hadn't been feeling well for the past few days with rhinitis. She got up to let her dog out, felt dizzy and fell the night prior to admission. She wasn't able to get up on her own. She dragged herself to the den where her daughter found her around 11pm last night. She helped her into a more comfortable position and slept there overnight. She was brought to the ER the following day and found to have RIght hip fracture. She was found to have creatinine of 3 with no prior known h/o renal disease. Sono showed b/l hydro and bladder distension. She was seen by urology and had nuñez placed. seen by cardiology, underwent echo and cleared for surgery  Underwent right hip replacement 3/7    3/8: pt seen and examined this am. Surprised about how well she feels. Wants to go home when ready for dc. no sob/chest pain. no n/v. tolerating po.     Review of system- All 10 systems reviewed and is as per HPI otherwise negative.       Vital Signs Last 24 Hrs  T(C): 36.7 (08 Mar 2020 11:06), Max: 36.7 (07 Mar 2020 21:00)  T(F): 98 (08 Mar 2020 11:06), Max: 98.1 (07 Mar 2020 21:00)  HR: 92 (08 Mar 2020 11:06) (61 - 92)  BP: 93/47 (08 Mar 2020 11:06) (89/46 - 108/56)  RR: 16 (08 Mar 2020 11:06) (12 - 18)  SpO2: 97% (08 Mar 2020 11:06) (96% - 100%)    PHYSICAL EXAM:    GENERAL: Comfortable, no acute distress  HEAD:  Atraumatic, Normocephalic  EYES: EOMI, PERRLA  HEENT: Moist mucous membranes  NECK: Supple, No JVD  NERVOUS SYSTEM:  Alert & Oriented X3,non focal  CHEST/LUNG: Clear to auscultation bilaterally  HEART: Regular rate and rhythm; No murmurs, rubs, or gallops  ABDOMEN: Soft, Nontender, Nondistended; Bowel sounds present  GENITOURINARY- nuñez+  EXTREMITIES:  No clubbing, cyanosis, or edema  MUSCULOSKELTAL- right hip dressing intact.  SKIN-no rash  LABS:                        8.2    9.03  )-----------( 278      ( 08 Mar 2020 08:06 )             27.2     03-08    137  |  108  |  27<H>  ----------------------------<  88  3.8   |  21<L>  |  2.28<H>    Ca    7.9<L>      08 Mar 2020 08:06  Phos  3.5     03-08    TPro  x   /  Alb  2.0<L>  /  TBili  x   /  DBili  x   /  AST  x   /  ALT  x   /  AlkPhos  x   03-08    PT/INR - ( 07 Mar 2020 08:24 )   PT: 13.0 sec;   INR: 1.17 ratio         PTT - ( 07 Mar 2020 08:24 )  PTT:26.8 sec  Urinalysis Basic - ( 06 Mar 2020 11:55 )    Color: Yellow / Appearance: very cloudy / S.010 / pH: x  Gluc: x / Ketone: Negative  / Bili: Negative / Urobili: Negative mg/dL   Blood: x / Protein: 30 mg/dL / Nitrite: Negative   Leuk Esterase: Moderate / RBC: 6-10 /HPF / WBC 3-5   Sq Epi: x / Non Sq Epi: Occasional / Bacteria: TNTC        CARDIAC MARKERS ( 06 Mar 2020 16:38 )  <0.015 ng/mL / x     / x     / x     / x        MEDICATIONS  (STANDING):  enoxaparin Injectable 30 milliGRAM(s) SubCutaneous every 24 hours  lactated ringers. 1000 milliLiter(s) (75 mL/Hr) IV Continuous <Continuous>  polyethylene glycol 3350 17 Gram(s) Oral daily    MEDICATIONS  (PRN):  acetaminophen   Tablet .. 650 milliGRAM(s) Oral every 6 hours PRN Temp greater or equal to 38C (100.4F), Mild Pain (1 - 3)  aluminum hydroxide/magnesium hydroxide/simethicone Suspension 30 milliLiter(s) Oral four times a day PRN Indigestion  HYDROmorphone  Injectable 0.5 milliGRAM(s) SubCutaneous every 4 hours PRN Severe Pain (7 - 10)  magnesium hydroxide Suspension 30 milliLiter(s) Oral three times a day PRN Constipation  ondansetron Injectable 4 milliGRAM(s) IV Push every 6 hours PRN Nausea and/or Vomiting  oxyCODONE    IR 2.5 milliGRAM(s) Oral every 4 hours PRN Mild Pain (1 - 3)  oxyCODONE    IR 5 milliGRAM(s) Oral every 4 hours PRN Moderate Pain (4 - 6)  senna 2 Tablet(s) Oral at bedtime PRN Constipation      ASSESSMENT AND PLAN:  83F, pmh as above a/w:    1. Fall/Right hip fracture:  -S/P right hip replacement POD#1  -pain control  -physical therapy  -incentive spirometry  -bowel regimen.     2. Acute blood loss anemia:  -due to surgery/post op hematoma.  -monitor h/h    3. Leukocytosis:  -likely reactive to fall/fracture  -resolved    4. FRANCIS vs CKD:  -b/l hydro on sono.  -nuñez placed per urology  -nephrology eval appreciated  -improving slowly.     5. Abnormal EKG:  -trops neg  -echo noted  -seen by cardio , outpt f/u    6. DVT px

## 2020-03-08 NOTE — PHYSICAL THERAPY INITIAL EVALUATION ADULT - GENERAL OBSERVATIONS, REHAB EVAL
Pt. received supine in bed with abd wedge between legs, + folet + flowtrons, + bed alarm agreeable to PT.

## 2020-03-08 NOTE — PHYSICAL THERAPY INITIAL EVALUATION ADULT - CRITERIA FOR SKILLED THERAPEUTIC INTERVENTIONS
rehab potential/anticipated equipment needs at discharge/anticipated discharge recommendation/predicted duration of therapy intervention/impairments found/functional limitations in following categories/risk reduction/prevention/therapy frequency

## 2020-03-08 NOTE — PROGRESS NOTE ADULT - ASSESSMENT
83 yo woman with unknown medical hx admitted with Right Hip fx due to fall.  Noted for renal dysfunction with unknown hx and bilateral moderate hydronephrosis.  Kidneys are noted to be of normal echogenicity and size indicating obstruction may be more acute.  At this point, level of obstruction is unclear.  Suggest  evaluation prior to surgery to resolve obstruction.  Feel combination of obstructive uropathy with possible post op ATN would result in unacceptable loss of kidney function leading to greater complications, mary ellen when pt's baseline renal function is unknown with current GFR at <15 cc/min.  CT non-contrast to attempt to identify level of obstruction.  Will re-evaluate in am once  evaluation is done.    3/7 SY  --FRANCIS likely with some degree of CKD based on moderate bilateral renal atrophy seen on CT.    Renal parameters slightly improved with relief of obstruction --per , due to bladder floor prolapse.   Continue nuñez drainage.  --Cardiology evaluation appreciated--EF preserved.  --From renal standpoint, no contraindication for surgery.  Continue LR/IVF.    3/8 SY  --FRANCIS/CKD with unclear baseline function.    Continue IVF for another 1-2 days.  --Bladder prolapse/obstructive uropathy :  follow up.    --Urine culture positive : Follow up ID and sens of organisms.

## 2020-03-08 NOTE — CONSULT NOTE ADULT - ASSESSMENT
An 85 y/o woman with hx of bladder prolapse p/w mechanical fall and with right femoral neck fx and s/p right THR on 3/7/2020 with Dr. Holm. She is POD #1. She is with VTE risk factors, and discussed VTE ppx medications. She is bladder prolapse, and is aware of her elevated SCr today of 2.28. CrCl of 19.55. We discussed options of Eliquis low dosing vs. Coumadin, its risks/benefits. She agrees for Eliquis therapy.     PLAN:  - stop lovenox and start Eliquis 2.5 mg PO BID x 35 days until attentively 4/12/2020  - GI ppx while on Eliquis therapy  - Monitor CBC/BMP - SCr  - Maintain b/l LE venodynes and encourage mobilization  - Dispo: Pt requests home. Sent eRx to [facility]. Will follow up with copay and arrange outpatient lab work    Thank you for consult, will continue to follow.

## 2020-03-09 LAB
-  AMIKACIN: SIGNIFICANT CHANGE UP
-  AMPICILLIN/SULBACTAM: SIGNIFICANT CHANGE UP
-  AMPICILLIN: SIGNIFICANT CHANGE UP
-  AMPICILLIN: SIGNIFICANT CHANGE UP
-  AZTREONAM: SIGNIFICANT CHANGE UP
-  CEFAZOLIN: SIGNIFICANT CHANGE UP
-  CEFEPIME: SIGNIFICANT CHANGE UP
-  CEFOXITIN: SIGNIFICANT CHANGE UP
-  CEFTRIAXONE: SIGNIFICANT CHANGE UP
-  CIPROFLOXACIN: SIGNIFICANT CHANGE UP
-  CIPROFLOXACIN: SIGNIFICANT CHANGE UP
-  GENTAMICIN: SIGNIFICANT CHANGE UP
-  IMIPENEM: SIGNIFICANT CHANGE UP
-  LEVOFLOXACIN: SIGNIFICANT CHANGE UP
-  LEVOFLOXACIN: SIGNIFICANT CHANGE UP
-  MEROPENEM: SIGNIFICANT CHANGE UP
-  NITROFURANTOIN: SIGNIFICANT CHANGE UP
-  NITROFURANTOIN: SIGNIFICANT CHANGE UP
-  PIPERACILLIN/TAZOBACTAM: SIGNIFICANT CHANGE UP
-  TETRACYCLINE: SIGNIFICANT CHANGE UP
-  TIGECYCLINE: SIGNIFICANT CHANGE UP
-  TOBRAMYCIN: SIGNIFICANT CHANGE UP
-  TRIMETHOPRIM/SULFAMETHOXAZOLE: SIGNIFICANT CHANGE UP
-  VANCOMYCIN: SIGNIFICANT CHANGE UP
ALBUMIN SERPL ELPH-MCNC: 2.1 G/DL — LOW (ref 3.3–5)
ANION GAP SERPL CALC-SCNC: 7 MMOL/L — SIGNIFICANT CHANGE UP (ref 5–17)
BASOPHILS # BLD AUTO: 0.05 K/UL — SIGNIFICANT CHANGE UP (ref 0–0.2)
BASOPHILS NFR BLD AUTO: 0.6 % — SIGNIFICANT CHANGE UP (ref 0–2)
BUN SERPL-MCNC: 23 MG/DL — SIGNIFICANT CHANGE UP (ref 7–23)
CALCIUM SERPL-MCNC: 8.2 MG/DL — LOW (ref 8.5–10.1)
CHLORIDE SERPL-SCNC: 108 MMOL/L — SIGNIFICANT CHANGE UP (ref 96–108)
CO2 SERPL-SCNC: 22 MMOL/L — SIGNIFICANT CHANGE UP (ref 22–31)
CREAT SERPL-MCNC: 2.21 MG/DL — HIGH (ref 0.5–1.3)
CULTURE RESULTS: SIGNIFICANT CHANGE UP
EOSINOPHIL # BLD AUTO: 0.23 K/UL — SIGNIFICANT CHANGE UP (ref 0–0.5)
EOSINOPHIL NFR BLD AUTO: 2.8 % — SIGNIFICANT CHANGE UP (ref 0–6)
GLUCOSE SERPL-MCNC: 102 MG/DL — HIGH (ref 70–99)
HCT VFR BLD CALC: 26.7 % — LOW (ref 34.5–45)
HGB BLD-MCNC: 8.4 G/DL — LOW (ref 11.5–15.5)
IMM GRANULOCYTES NFR BLD AUTO: 0.9 % — SIGNIFICANT CHANGE UP (ref 0–1.5)
LYMPHOCYTES # BLD AUTO: 0.86 K/UL — LOW (ref 1–3.3)
LYMPHOCYTES # BLD AUTO: 10.5 % — LOW (ref 13–44)
MCHC RBC-ENTMCNC: 28.2 PG — SIGNIFICANT CHANGE UP (ref 27–34)
MCHC RBC-ENTMCNC: 31.5 GM/DL — LOW (ref 32–36)
MCV RBC AUTO: 89.6 FL — SIGNIFICANT CHANGE UP (ref 80–100)
METHOD TYPE: SIGNIFICANT CHANGE UP
METHOD TYPE: SIGNIFICANT CHANGE UP
MONOCYTES # BLD AUTO: 0.69 K/UL — SIGNIFICANT CHANGE UP (ref 0–0.9)
MONOCYTES NFR BLD AUTO: 8.4 % — SIGNIFICANT CHANGE UP (ref 2–14)
NEUTROPHILS # BLD AUTO: 6.28 K/UL — SIGNIFICANT CHANGE UP (ref 1.8–7.4)
NEUTROPHILS NFR BLD AUTO: 76.8 % — SIGNIFICANT CHANGE UP (ref 43–77)
ORGANISM # SPEC MICROSCOPIC CNT: SIGNIFICANT CHANGE UP
PHOSPHATE SERPL-MCNC: 2.9 MG/DL — SIGNIFICANT CHANGE UP (ref 2.5–4.5)
PLATELET # BLD AUTO: 274 K/UL — SIGNIFICANT CHANGE UP (ref 150–400)
POTASSIUM SERPL-MCNC: 3.8 MMOL/L — SIGNIFICANT CHANGE UP (ref 3.5–5.3)
POTASSIUM SERPL-SCNC: 3.8 MMOL/L — SIGNIFICANT CHANGE UP (ref 3.5–5.3)
RBC # BLD: 2.98 M/UL — LOW (ref 3.8–5.2)
RBC # FLD: 13.2 % — SIGNIFICANT CHANGE UP (ref 10.3–14.5)
SODIUM SERPL-SCNC: 137 MMOL/L — SIGNIFICANT CHANGE UP (ref 135–145)
SPECIMEN SOURCE: SIGNIFICANT CHANGE UP
WBC # BLD: 8.18 K/UL — SIGNIFICANT CHANGE UP (ref 3.8–10.5)
WBC # FLD AUTO: 8.18 K/UL — SIGNIFICANT CHANGE UP (ref 3.8–10.5)

## 2020-03-09 PROCEDURE — 99231 SBSQ HOSP IP/OBS SF/LOW 25: CPT

## 2020-03-09 PROCEDURE — 99232 SBSQ HOSP IP/OBS MODERATE 35: CPT

## 2020-03-09 RX ORDER — APIXABAN 2.5 MG/1
1 TABLET, FILM COATED ORAL
Qty: 66 | Refills: 0
Start: 2020-03-09 | End: 2020-04-10

## 2020-03-09 RX ADMIN — OXYCODONE HYDROCHLORIDE 5 MILLIGRAM(S): 5 TABLET ORAL at 09:24

## 2020-03-09 RX ADMIN — APIXABAN 2.5 MILLIGRAM(S): 2.5 TABLET, FILM COATED ORAL at 18:48

## 2020-03-09 RX ADMIN — OXYCODONE HYDROCHLORIDE 5 MILLIGRAM(S): 5 TABLET ORAL at 10:15

## 2020-03-09 RX ADMIN — APIXABAN 2.5 MILLIGRAM(S): 2.5 TABLET, FILM COATED ORAL at 06:07

## 2020-03-09 RX ADMIN — Medication 650 MILLIGRAM(S): at 21:56

## 2020-03-09 RX ADMIN — Medication 650 MILLIGRAM(S): at 21:26

## 2020-03-09 NOTE — PROGRESS NOTE ADULT - ASSESSMENT
An 83 y/o woman with hx of bladder prolapse p/w mechanical fall and with right femoral neck fx and s/p right THR on 3/7/2020 with Dr. Holm. She is POD #1. She is with VTE risk factors, and discussed VTE ppx medications. She is bladder prolapse, and is aware of her elevated SCr today of 2.28. CrCl of 19.55. We discussed options of Eliquis low dosing vs. Coumadin, its risks/benefits. She agrees for Eliquis therapy.     PLAN:  - Continue Eliquis 2.5 mg PO BID x 35 days until attentively 4/12/2020  - GI ppx while on Eliquis therapy  - Monitor CBC/BMP - SCr (down trending)  - Maintain b/l LE venodynes and encourage mobilization  - Dispo: Pt requests home. Sent eRx to [facility]. Will follow up with copay and arrange outpatient lab  - will continue to follow.

## 2020-03-09 NOTE — PROGRESS NOTE ADULT - ASSESSMENT
85 yo woman with unknown medical hx admitted with Right Hip fx due to fall.  Noted for renal dysfunction with unknown hx and bilateral moderate hydronephrosis.  Kidneys are noted to be of normal echogenicity and size indicating obstruction may be more acute.  At this point, level of obstruction is unclear.  Suggest  evaluation prior to surgery to resolve obstruction.  Feel combination of obstructive uropathy with possible post op ATN would result in unacceptable loss of kidney function leading to greater complications, mary ellen when pt's baseline renal function is unknown with current GFR at <15 cc/min.  CT non-contrast to attempt to identify level of obstruction.  Will re-evaluate in am once  evaluation is done.    3/7 SY  --FRANCIS likely with some degree of CKD based on moderate bilateral renal atrophy seen on CT.    Renal parameters slightly improved with relief of obstruction --per , due to bladder floor prolapse.   Continue nuñez drainage.  --Cardiology evaluation appreciated--EF preserved.  --From renal standpoint, no contraindication for surgery.  Continue LR/IVF.    3/8 SY  --FRANCIS/CKD with unclear baseline function.    Continue IVF for another 1-2 days.  --Bladder prolapse/obstructive uropathy :  follow up.    --Urine culture positive : Follow up ID and sens of organisms.    3/9 SY  --FRANCIS/CKD : slowly improving with unclear baseline.    D/c IVF and monitor.  --Obstructive uropathy due to bladder prolapse : To d/c with nuñez catheter.  --From renal standpoint, ok for d/c home.

## 2020-03-09 NOTE — PROGRESS NOTE ADULT - SUBJECTIVE AND OBJECTIVE BOX
Patient seen and examined at bedside. No acute events over night. No acute complaints at this time. Pain well controlled. Denies chest pain, shortness of breath, nausea or vomiting.     Vital Signs Last 24 Hrs  T(C): 37.3 (08 Mar 2020 23:41), Max: 37.3 (08 Mar 2020 23:41)  T(F): 99.1 (08 Mar 2020 23:41), Max: 99.1 (08 Mar 2020 23:41)  HR: 95 (08 Mar 2020 23:41) (92 - 102)  BP: 101/58 (08 Mar 2020 23:41) (93/47 - 105/63)  BP(mean): --  RR: 16 (08 Mar 2020 23:41) (16 - 16)  SpO2: 100% (08 Mar 2020 23:41) (94% - 100%)                          8.2    9.03  )-----------( 278      ( 08 Mar 2020 08:06 )             27.2       PE:  General: NAD, resting comfortably in bed  R LE:   Dressing C/D/I  03-08    137  |  108  |  27<H>  ----------------------------<  88  3.8   |  21<L>  |  2.28<H>    Ca    7.9<L>      08 Mar 2020 08:06  Phos  3.5     03-08    TPro  x   /  Alb  2.0<L>  /  TBili  x   /  DBili  x   /  AST  x   /  ALT  x   /  AlkPhos  x   03-08  SCDs present bilaterally  Compartments soft and compressible  No calf tenderness bilaterally  +TA/EHL/FHL/GSC  SILT L3-S1  2+ DP/PT

## 2020-03-09 NOTE — PROGRESS NOTE ADULT - SUBJECTIVE AND OBJECTIVE BOX
NEPHROLOGY INTERVAL HPI/OVERNIGHT EVENTS:    Date of Service: 03-09-20 @ 14:43  3/9 SY  Feeling well.  No new complaints.  No SOB.    3/8 SY  Post right Hip fx repair.  No complications.  Feeling well.  No new complaints.    3/7 SY  No acute events overnight.  Feeling fairly comfortable  Cardiology and  evaluation appreciated.  Rosario placed with good urine output.    HPI:  85 yo woman with unclear PMHX and no medical follow up for several years except for GYN and urgent care centers fell at home due to dizzyness yesterday.  Daughter reports pt felt well on Tuesday, 3/3 and had an active day followed by fatigue and then nausea.  Pt and daughter are adamant that this was due to post nasal drip.  Denies vomiting, diarrhea or abdominal pain.  However, pt had virtually no po intake for 2 1/2 days and when she got up yesterday to let her dog out, she felt dizzy and fell.  Daughter reports pt has had significant weight loss and very decreased po intake since losing her  5 years ago.  However, she has not had any evaluation for this.  On admission, noted with Bun/creat =34/3.11 and more problematically bilateral Hydronephrosis.    Pt reports she was told of "prolapsed bladder" by her GYN but has not had  evaluation.  Pt is tentatively scheduled for OR in am.  Pt is on no meds.  Very seldom takes NSAIDS for arthritis.    PMHX and PSHX.  Unknown.  Unclear hx of Prolapsed Bladder  Arthritis.        MEDICATIONS  (STANDING):  apixaban 2.5 milliGRAM(s) Oral two times a day  lactated ringers. 1000 milliLiter(s) (75 mL/Hr) IV Continuous <Continuous>  polyethylene glycol 3350 17 Gram(s) Oral daily    MEDICATIONS  (PRN):  acetaminophen   Tablet .. 650 milliGRAM(s) Oral every 6 hours PRN Temp greater or equal to 38C (100.4F), Mild Pain (1 - 3)  aluminum hydroxide/magnesium hydroxide/simethicone Suspension 30 milliLiter(s) Oral four times a day PRN Indigestion  bisacodyl Suppository 10 milliGRAM(s) Rectal daily PRN If no bowel movement by POD#2  HYDROmorphone  Injectable 0.5 milliGRAM(s) SubCutaneous every 4 hours PRN Severe Pain (7 - 10)  magnesium hydroxide Suspension 30 milliLiter(s) Oral three times a day PRN Constipation  ondansetron Injectable 4 milliGRAM(s) IV Push every 6 hours PRN Nausea and/or Vomiting  oxyCODONE    IR 2.5 milliGRAM(s) Oral every 4 hours PRN Mild Pain (1 - 3)  oxyCODONE    IR 5 milliGRAM(s) Oral every 4 hours PRN Moderate Pain (4 - 6)  senna 2 Tablet(s) Oral at bedtime PRN Constipation    Vital Signs Last 24 Hrs  T(C): 36.8 (09 Mar 2020 11:13), Max: 37.3 (08 Mar 2020 23:41)  T(F): 98.3 (09 Mar 2020 11:13), Max: 99.1 (08 Mar 2020 23:41)  HR: 67 (09 Mar 2020 11:13) (67 - 102)  BP: 101/67 (09 Mar 2020 11:13) (101/58 - 105/63)  BP(mean): --  RR: 16 (09 Mar 2020 11:13) (16 - 16)  SpO2: 97% (09 Mar 2020 11:13) (93% - 100%)  Daily     Daily     03-08 @ 07:01  -  03-09 @ 07:00  --------------------------------------------------------  IN: 0 mL / OUT: 1000 mL / NET: -1000 mL    PHYSICAL EXAM:  Alert and comfortable  GENERAL: No distress  CHEST/LUNG: Clear to aus  HEART: S1S2 RRR  ABDOMEN: soft  EXTREMITIES: no edema  SKIN:     LABS:                        8.4    8.18  )-----------( 274      ( 09 Mar 2020 06:26 )             26.7     03-09    137  |  108  |  23  ----------------------------<  102<H>  3.8   |  22  |  2.21<H>    Ca    8.2<L>      09 Mar 2020 06:26  Phos  2.9     03-09    TPro  x   /  Alb  2.1<L>  /  TBili  x   /  DBili  x   /  AST  x   /  ALT  x   /  AlkPhos  x   03-09        Phosphorus Level, Serum: 2.9 mg/dL (03-09 @ 06:26)          RADIOLOGY & ADDITIONAL TESTS:

## 2020-03-09 NOTE — PROGRESS NOTE ADULT - SUBJECTIVE AND OBJECTIVE BOX
HPI: 84F, h/o bladder prolapse, does not follow regularly with physicians who presented to the ER with inability to ambulate after a fall. She hadn't been feeling well for the past few days with rhinitis. She got up to let her dog out, felt dizzy and fell last night. She wasn't able to get up on her own. She dragged herself to the den where her daughter found her around 11pm last night. She helped her into a more comfortable position and slept there overnight. She was brought to the ER today and found to have RIght hip fracture. Planned for OR 3/7. She denies any medical history. No h/o MI/stroke/vascular disease. Does not really exercise. Ambulates within her home and can climb her stairs without difficulty. No recent chest pains/sob. Denies fever but did have chills. No cough.  No n/v/d. Has had poor appetite/po intake since her  passed away 5 years ago.   +weight loss, can not quantify.  Denies decreased urine outpt/dysuria/hematuria. had vague flank pain a few days ago which was self limited. Denies recent nsaid use. Takes asa 81 daily when she remembers.    3/8/2020: Pt seen at bedside on 2N. POD #1 s/p right THR per ortho team. We discussed VTE risk factors, and VTE ppx medications. Patient denies prior hx of VTE, no blood clotting disorder in family. She is bladder prolapse, and is aware of her elevated SCr today of 2.28. CrCl of 19.55. We discussed options of Eliquis low dosing vs. Coumadin, its risks/benefits.  3/9/2020: Pt seen at bedside, no concerns regarding AC scr. down trending,  will continue Eliquis    Patient is a 84y old  Female who presents with a chief complaint of right hip fracture (08 Mar 2020 11:23)    Consulted by Dr. Holm for VTE prophylaxis, risk stratification, and anticoagulation management.    PAST MEDICAL & SURGICAL HISTORY:  No pertinent past medical history  No significant past surgical history    BMI: 24.6    CrCL: 19.9    CAPRINI SCORE  AGE RELATED RISK FACTORS                                                       MOBILITY RELATED FACTORS  [ ] Age 41-60 years                                            (1 Point)                  [ ] Bed rest                                                        (1 Point)  [ ] Age: 61-74 years                                           (2 Points)                [ ] Plaster cast                                                   (2 Points)  [X ] Age= 75 years                                              (3 Points)                 [ ] Bed bound for more than 72 hours                   (2 Points)    DISEASE RELATED RISK FACTORS                                               GENDER SPECIFIC FACTORS  [ ] Edema in the lower extremities                       (1 Point)           [ ] Pregnancy                                                            (1 Point)  [ ] Varicose veins                                               (1 Point)                  [ ] Post-partum < 6 weeks                                      (1 Point)             [ ] BMI > 25 Kg/m2                                            (1 Point)                  [ ] Hormonal therapy or oral contraception       (1 Point)                 [ ] Sepsis (in the previous month)                        (1 Point)             [ ] History of pregnancy complications                (1Point)  [ ] Pneumonia or serious lung disease                                             [ ] Unexplained or recurrent  (=3), premature                                 (In the previous month)                               (1 Point)                birth with toxemia or growth-restricted infant (1 Point)  [ ] Abnormal pulmonary function test            (1 Point)                                   SURGERY RELATED RISK FACTORS  [ ] Acute myocardial infarction                       (1 Point)                  [ ]  Section                                         (1 Point)  [ ] Congestive heart failure (in the previous month) (1 Point)   [ ] Minor surgery   lasting <45 minutes       (1 Point)   [ ] Inflammatory bowel disease                             (1 Point)          [ ] Arthroscopic surgery                                  (2 Points)  [ ] Central venous access                                    (2 Points)            [ ] General surgery lasting >45 minutes      (2 Points)       [ ] Stroke (in the previous month)                  (5 Points)            [ ] Elective major lower extremity arthroplasty (5 Points)                                   [  ] Malignancy (present or past include skin melanoma                                          but exclude  basal skin cell)    (2 points)                                      TRAUMA RELATED RISK FACTORS                HEMATOLOGY RELATED FACTORS                                  [X ] Fracture of the hip, pelvis, or leg                       (5 Points)  [ ] Prior episodes of VTE                                     (3 Points)          [ ] Acute spinal cord injury (in the previous month)  (5 Points)  [ ] Positive family history for VTE                         (3 Points)       [ ] Paralysis (less than 1 month)                          (5 Points)  [ ] Prothrombin 54593 A                                      (3 Points)         [ ] Multiple Trauma (within 1month)                 (5Points)                                                                                                                                                                [ ] Factor V Leiden                                          (3 Points)                                OTHER RISK FACTORS                          [ ] Lupus anticoagulants                                     (3 Points)                       [ ] BMI > 40                          (1 Point)                                                         [ ] Anticardiolipin antibodies                                (3 Points)                   [ ] Smoking                              (1Point)                                                [ ] High homocysteine in the blood                      (3 Points)                [  ] Diabetes requiring insulin (1point)                         [ ] Other congenital or acquired thrombophilia       (3 Points)          [  ] Chemotherapy                   (1 Point)  [ ] Heparin induced thrombocytopenia                  (3 Points)             [  ] Blood Transfusion                (1 point)                                                                                                         Total Score [ 8   ]                                                                                                                   IMPROVE Bleeding Risk Score: 3.5    Falls Risk:   High ( X )  Mod (  )  Low (  )    EBL: 200 ml    PROCEDURE START: 12:25  PROCEDURE END: 1435    Social: denies tobacco/etoh use    FAMILY HISTORY:  Denies any personal or familial history of clotting or bleeding disorders.    Allergies  No Known Allergies  Intolerances    REVIEW OF SYSTEMS    (  )Fever	     (  )Constipation	(  )SOB			(  )Headache	(  )Dysuria  (  )Chills	     (  )Melena	(  )Dyspnea present on exertion    (  )Dizziness                    (  )Polyuria  (  )Nausea	     (  )Hematochezia	(  )Cough		                    (  )Syncope   	(  )Hematuria  (  )Vomiting    (  )Chest Pain	(  )Wheezing		( X )Weakness  (  )Diarrhea     (  )Palpitations	(  )Anorexia		(  )Myalgia    All other review of systems negative: Yes    PHYSICAL EXAM:  Vital Signs Last 24 Hrs  T(C): 36.7 (09 Mar 2020 05:28), Max: 37.3 (08 Mar 2020 23:41)  T(F): 98 (09 Mar 2020 05:28), Max: 99.1 (08 Mar 2020 23:41)  HR: 86 (09 Mar 2020 05:28) (86 - 102)  BP: 102/59 (09 Mar 2020 05:28) (93/47 - 105/63)  BP(mean): --  RR: 16 (09 Mar 2020 05:28) (16 - 16)  SpO2: 93% (09 Mar 2020 05:28) (93% - 100%)  Constitutional: Appears Well    Neurological: A& O x 4    Skin: Warm    Respiratory and Thorax: normal effort; Breath sounds: normal; No rales/wheezing/rhonchi  	  Cardiovascular: S1, S2, regular, NMBR	    Gastrointestinal: BS + x 4Q, nontender	    Genitourinary:  Bladder nondistended, nontender    Musculoskeletal:   General Right:   no muscle/joint tenderness,   normal tone, no joint swelling,   ROM: limited	    General Left:   no muscle/joint tenderness,   normal tone, no joint swelling,   ROM: full    Hip:  Right: Dressing CDI    Lower extrems:   Right: no calf tenderness              negative kristine's sign               + pedal pulses    Left:   no calf tenderness              negative kristine's sign               + pedal pulses    MEDICATIONS  (STANDING):  apixaban 2.5 milliGRAM(s) Oral two times a day  lactated ringers. 1000 milliLiter(s) IV Continuous <Continuous>  polyethylene glycol 3350 17 Gram(s) Oral daily    LABS:                             8.4    8.18  )-----------( 274      ( 09 Mar 2020 06:26 )             26.7       03-09    137  |  108  |  23  ----------------------------<  102<H>  3.8   |  22  |  2.21<H>    Ca    8.2<L>      09 Mar 2020 06:26  Phos  2.9     03-09    TPro  x   /  Alb  2.1<L>  /  TBili  x   /  DBili  x   /  AST  x   /  ALT  x   /  AlkPhos  x   03-                         8.2    9.03  )-----------( 278      ( 08 Mar 2020 08:06 )             27.2     03-08    137  |  108  |  27<H>  ----------------------------<  88  3.8   |  21<L>  |  2.28<H>    Ca    7.9<L>      08 Mar 2020 08:06  Phos  3.5     03-08    TPro  x   /  Alb  2.0<L>  /  TBili  x   /  DBili  x   /  AST  x   /  ALT  x   /  AlkPhos  x   03-08    PT/INR - ( 07 Mar 2020 08:24 )   PT: 13.0 sec;   INR: 1.17 ratio         PTT - ( 07 Mar 2020 08:24 )  PTT:26.8 sec  LIVER FUNCTIONS - ( 08 Mar 2020 08:06 )  Alb: 2.0 g/dL / Pro: x     / ALK PHOS: x     / ALT: x     / AST: x     / GGT: x           CARDIAC MARKERS ( 06 Mar 2020 16:38 )  <0.015 ng/mL / x     / x     / x     / x        RADIOLOGY, EKG & ADDITIONAL TESTS: Reviewed.     EXAM:  XR HIP WITH PELV 1V RT                        PROCEDURE DATE:  2020    INTERPRETATION:      HISTORY: Total hip replacement    Two views of the RIGHT hip are submitted.  Evaluation demonstrates both femoral and acetabular components well-seated and in good anatomic alignment. There is no evidence of fracture. The visualized pelvis appears within normal limits.  Impression: Hip replacement as described above.    DVT Prophylaxis:  LMWH                   (  )  Heparin SQ           (  )  Coumadin             (  )  Xarelto                  (  )  Eliquis                   ( X )  Venodynes           ( X )  Ambulation          ( X)  UFH                       (  )  Contraindicated  (  )  ASA                       (   )

## 2020-03-09 NOTE — PROGRESS NOTE ADULT - ASSESSMENT
A/P:  84y f s/p R JAMESON POD 2    -PT/OT -WBAT, posterior hip precautions, abduction pilllow while sleeping  -Pain Control  -DVT ppx per AC team  -FU AM Labs  -Rest, ice, compress and elevate the extremity as we needed  -Incentive Spirometry  -Medical management appreciated  -Dispo planning  -Will discuss w/ attending and advise if plan changes

## 2020-03-10 ENCOUNTER — TRANSCRIPTION ENCOUNTER (OUTPATIENT)
Age: 85
End: 2020-03-10

## 2020-03-10 VITALS
RESPIRATION RATE: 16 BRPM | DIASTOLIC BLOOD PRESSURE: 72 MMHG | SYSTOLIC BLOOD PRESSURE: 116 MMHG | OXYGEN SATURATION: 97 % | HEART RATE: 92 BPM | TEMPERATURE: 100 F

## 2020-03-10 PROCEDURE — 99231 SBSQ HOSP IP/OBS SF/LOW 25: CPT

## 2020-03-10 PROCEDURE — 99239 HOSP IP/OBS DSCHRG MGMT >30: CPT

## 2020-03-10 RX ORDER — POLYETHYLENE GLYCOL 3350 17 G/17G
17 POWDER, FOR SOLUTION ORAL
Qty: 0 | Refills: 0 | DISCHARGE
Start: 2020-03-10

## 2020-03-10 RX ORDER — OXYCODONE HYDROCHLORIDE 5 MG/1
1 TABLET ORAL
Qty: 28 | Refills: 0
Start: 2020-03-10 | End: 2020-03-16

## 2020-03-10 RX ORDER — LACTOBACILLUS RHAMNOSUS GG 10B CELL
1 CAPSULE ORAL
Qty: 0 | Refills: 0 | DISCHARGE

## 2020-03-10 RX ORDER — ACETAMINOPHEN 500 MG
2 TABLET ORAL
Qty: 0 | Refills: 0 | DISCHARGE
Start: 2020-03-10

## 2020-03-10 RX ORDER — SENNA PLUS 8.6 MG/1
2 TABLET ORAL
Qty: 0 | Refills: 0 | DISCHARGE
Start: 2020-03-10

## 2020-03-10 RX ADMIN — Medication 650 MILLIGRAM(S): at 13:27

## 2020-03-10 RX ADMIN — APIXABAN 2.5 MILLIGRAM(S): 2.5 TABLET, FILM COATED ORAL at 05:51

## 2020-03-10 RX ADMIN — POLYETHYLENE GLYCOL 3350 17 GRAM(S): 17 POWDER, FOR SOLUTION ORAL at 11:32

## 2020-03-10 NOTE — PROGRESS NOTE ADULT - SUBJECTIVE AND OBJECTIVE BOX
HPI: 84F, h/o bladder prolapse, does not follow regularly with physicians who presented to the ER with inability to ambulate after a fall. She hadn't been feeling well for the past few days with rhinitis. She got up to let her dog out, felt dizzy and fell last night. She wasn't able to get up on her own. She dragged herself to the den where her daughter found her around 11pm last night. She helped her into a more comfortable position and slept there overnight. She was brought to the ER today and found to have RIght hip fracture. Planned for OR 3/7. She denies any medical history. No h/o MI/stroke/vascular disease. Does not really exercise. Ambulates within her home and can climb her stairs without difficulty. No recent chest pains/sob. Denies fever but did have chills. No cough.  No n/v/d. Has had poor appetite/po intake since her  passed away 5 years ago.   +weight loss, can not quantify.  Denies decreased urine outpt/dysuria/hematuria. had vague flank pain a few days ago which was self limited. Denies recent nsaid use. Takes asa 81 daily when she remembers.    3/8/2020: Pt seen at bedside on 2N. POD #1 s/p right THR per ortho team. We discussed VTE risk factors, and VTE ppx medications. Patient denies prior hx of VTE, no blood clotting disorder in family. She is bladder prolapse, and is aware of her elevated SCr today of 2.28. CrCl of 19.55. We discussed options of Eliquis low dosing vs. Coumadin, its risks/benefits.  3/9/2020: Pt seen at bedside, no concerns regarding AC scr. down trending,  will continue Eliquis  3/10/2020: Pt seen at bedside, no concerns regarding AC possible home today, pt is informed about the out pt lab, s/s of bleeding and clotting reviewed with pt verbalized understanding.    Patient is a 84y old  Female who presents with a chief complaint of right hip fracture (08 Mar 2020 11:23)    Consulted by Dr. Holm for VTE prophylaxis, risk stratification, and anticoagulation management.    PAST MEDICAL & SURGICAL HISTORY:  No pertinent past medical history  No significant past surgical history    BMI: 24.6    CrCL: 19.9    CAPRINI SCORE  AGE RELATED RISK FACTORS                                                       MOBILITY RELATED FACTORS  [ ] Age 41-60 years                                            (1 Point)                  [ ] Bed rest                                                        (1 Point)  [ ] Age: 61-74 years                                           (2 Points)                [ ] Plaster cast                                                   (2 Points)  [X ] Age= 75 years                                              (3 Points)                 [ ] Bed bound for more than 72 hours                   (2 Points)    DISEASE RELATED RISK FACTORS                                               GENDER SPECIFIC FACTORS  [ ] Edema in the lower extremities                       (1 Point)           [ ] Pregnancy                                                            (1 Point)  [ ] Varicose veins                                               (1 Point)                  [ ] Post-partum < 6 weeks                                      (1 Point)             [ ] BMI > 25 Kg/m2                                            (1 Point)                  [ ] Hormonal therapy or oral contraception       (1 Point)                 [ ] Sepsis (in the previous month)                        (1 Point)             [ ] History of pregnancy complications                (1Point)  [ ] Pneumonia or serious lung disease                                             [ ] Unexplained or recurrent  (=3), premature                                 (In the previous month)                               (1 Point)                birth with toxemia or growth-restricted infant (1 Point)  [ ] Abnormal pulmonary function test            (1 Point)                                   SURGERY RELATED RISK FACTORS  [ ] Acute myocardial infarction                       (1 Point)                  [ ]  Section                                         (1 Point)  [ ] Congestive heart failure (in the previous month) (1 Point)   [ ] Minor surgery   lasting <45 minutes       (1 Point)   [ ] Inflammatory bowel disease                             (1 Point)          [ ] Arthroscopic surgery                                  (2 Points)  [ ] Central venous access                                    (2 Points)            [ ] General surgery lasting >45 minutes      (2 Points)       [ ] Stroke (in the previous month)                  (5 Points)            [ ] Elective major lower extremity arthroplasty (5 Points)                                   [  ] Malignancy (present or past include skin melanoma                                          but exclude  basal skin cell)    (2 points)                                      TRAUMA RELATED RISK FACTORS                HEMATOLOGY RELATED FACTORS                                  [X ] Fracture of the hip, pelvis, or leg                       (5 Points)  [ ] Prior episodes of VTE                                     (3 Points)          [ ] Acute spinal cord injury (in the previous month)  (5 Points)  [ ] Positive family history for VTE                         (3 Points)       [ ] Paralysis (less than 1 month)                          (5 Points)  [ ] Prothrombin 93448 A                                      (3 Points)         [ ] Multiple Trauma (within 1month)                 (5Points)                                                                                                                                                                [ ] Factor V Leiden                                          (3 Points)                                OTHER RISK FACTORS                          [ ] Lupus anticoagulants                                     (3 Points)                       [ ] BMI > 40                          (1 Point)                                                         [ ] Anticardiolipin antibodies                                (3 Points)                   [ ] Smoking                              (1Point)                                                [ ] High homocysteine in the blood                      (3 Points)                [  ] Diabetes requiring insulin (1point)                         [ ] Other congenital or acquired thrombophilia       (3 Points)          [  ] Chemotherapy                   (1 Point)  [ ] Heparin induced thrombocytopenia                  (3 Points)             [  ] Blood Transfusion                (1 point)                                                                                                         Total Score [ 8   ]                                                                                                                   IMPROVE Bleeding Risk Score: 3.5    Falls Risk:   High ( X )  Mod (  )  Low (  )    EBL: 200 ml    PROCEDURE START: 12:25  PROCEDURE END: 1435    Social: denies tobacco/etoh use    FAMILY HISTORY:  Denies any personal or familial history of clotting or bleeding disorders.    Allergies  No Known Allergies  Intolerances    REVIEW OF SYSTEMS    (  )Fever	     (  )Constipation	(  )SOB			(  )Headache	(  )Dysuria  (  )Chills	     (  )Melena	(  )Dyspnea present on exertion    (  )Dizziness                    (  )Polyuria  (  )Nausea	     (  )Hematochezia	(  )Cough		                    (  )Syncope   	(  )Hematuria  (  )Vomiting    (  )Chest Pain	(  )Wheezing		( X )Weakness  (  )Diarrhea     (  )Palpitations	(  )Anorexia		(  )Myalgia    All other review of systems negative: Yes    PHYSICAL EXAM:    Vital Signs Last 24 Hrs  T(C): 37.6 (09 Mar 2020 21:27), Max: 37.6 (09 Mar 2020 21:27)  T(F): 99.6 (09 Mar 2020 21:27), Max: 99.6 (09 Mar 2020 21:27)  HR: 96 (09 Mar 2020 21:27) (67 - 96)  BP: 103/62 (09 Mar 2020 21:27) (101/67 - 106/60)  BP(mean): --  RR: 16 (09 Mar 2020 21:27) (16 - 16)  SpO2: 96% (09 Mar 2020 21:27) (95% - 97%)  Constitutional: Appears Well    Neurological: A& O x 4    Skin: Warm    Respiratory and Thorax: normal effort; Breath sounds: normal; No rales/wheezing/rhonchi  	  Cardiovascular: S1, S2, regular, NMBR	    Gastrointestinal: BS + x 4Q, nontender	    Genitourinary:  Bladder nondistended, nontender    Musculoskeletal:   General Right:   no muscle/joint tenderness,   normal tone, no joint swelling,   ROM: limited	    General Left:   no muscle/joint tenderness,   normal tone, no joint swelling,   ROM: full    Hip:  Right: Dressing CDI    Lower extrems:   Right: no calf tenderness              negative kristine's sign               + pedal pulses    Left:   no calf tenderness              negative kristine's sign               + pedal pulses    MEDICATIONS  (STANDING):  apixaban 2.5 milliGRAM(s) Oral two times a day  lactated ringers. 1000 milliLiter(s) IV Continuous <Continuous>  polyethylene glycol 3350 17 Gram(s) Oral daily    LABS:                             8.4    8.18  )-----------( 274      ( 09 Mar 2020 06:26 )             26.7       03-09    137  |  108  |  23  ----------------------------<  102<H>  3.8   |  22  |  2.21<H>    Ca    8.2<L>      09 Mar 2020 06:26  Phos  2.9     03-09    TPro  x   /  Alb  2.1<L>  /  TBili  x   /  DBili  x   /  AST  x   /  ALT  x   /  AlkPhos  x   03                         8.2    9.03  )-----------( 278      ( 08 Mar 2020 08:06 )             27.2     03-08    137  |  108  |  27<H>  ----------------------------<  88  3.8   |  21<L>  |  2.28<H>    Ca    7.9<L>      08 Mar 2020 08:06  Phos  3.5     03-08    TPro  x   /  Alb  2.0<L>  /  TBili  x   /  DBili  x   /  AST  x   /  ALT  x   /  AlkPhos  x   03-08    PT/INR - ( 07 Mar 2020 08:24 )   PT: 13.0 sec;   INR: 1.17 ratio         PTT - ( 07 Mar 2020 08:24 )  PTT:26.8 sec  LIVER FUNCTIONS - ( 08 Mar 2020 08:06 )  Alb: 2.0 g/dL / Pro: x     / ALK PHOS: x     / ALT: x     / AST: x     / GGT: x           CARDIAC MARKERS ( 06 Mar 2020 16:38 )  <0.015 ng/mL / x     / x     / x     / x        RADIOLOGY, EKG & ADDITIONAL TESTS: Reviewed.     EXAM:  XR HIP WITH PELV 1V RT                        PROCEDURE DATE:  2020    INTERPRETATION:      HISTORY: Total hip replacement    Two views of the RIGHT hip are submitted.  Evaluation demonstrates both femoral and acetabular components well-seated and in good anatomic alignment. There is no evidence of fracture. The visualized pelvis appears within normal limits.  Impression: Hip replacement as described above.    DVT Prophylaxis:  LMWH                   (  )  Heparin SQ           (  )  Coumadin             (  )  Xarelto                  (  )  Eliquis                   ( X )  Venodynes           ( X )  Ambulation          ( X)  UFH                       (  )  Contraindicated  (  )  ASA                       (   )

## 2020-03-10 NOTE — PROGRESS NOTE ADULT - REASON FOR ADMISSION
right hip fracture

## 2020-03-10 NOTE — PROGRESS NOTE ADULT - ASSESSMENT
An 85 y/o woman with hx of bladder prolapse p/w mechanical fall and with right femoral neck fx and s/p right THR on 3/7/2020 with Dr. Holm. She is POD #1. She is with VTE risk factors, and discussed VTE ppx medications. She is bladder prolapse,We discussed options of Eliquis low dosing vs. Coumadin, its risks/benefits. She agrees for Eliquis therapy.     PLAN:  - Continue Eliquis 2.5 mg PO BID x 35 days until attentively 4/12/2020  - GI ppx while on Eliquis therapy  - Monitor CBC/BMP - SCr (down trending)  - Maintain b/l LE venodynes and encourage mobilization  - Dispo: Pt requests home. Sent eRx to [facility].  - will continue to follow.

## 2020-03-10 NOTE — PROGRESS NOTE ADULT - SUBJECTIVE AND OBJECTIVE BOX
Patient seen and examined at bedside. No acute events over night. No acute complaints at this time. Pain well controlled. Denies chest pain, shortness of breath, nausea or vomiting.      Vital Signs Last 24 Hrs  T(C): 37.6 (09 Mar 2020 21:27), Max: 37.6 (09 Mar 2020 21:27)  T(F): 99.6 (09 Mar 2020 21:27), Max: 99.6 (09 Mar 2020 21:27)  HR: 96 (09 Mar 2020 21:27) (67 - 96)  BP: 103/62 (09 Mar 2020 21:27) (101/67 - 106/60)  BP(mean): --  RR: 16 (09 Mar 2020 21:27) (16 - 16)  SpO2: 96% (09 Mar 2020 21:27) (95% - 97%)                          8.4    8.18  )-----------( 274      ( 09 Mar 2020 06:26 )             26.7       PE:  03-09    137  |  108  |  23  ----------------------------<  102<H>  3.8   |  22  |  2.21<H>    Ca    8.2<L>      09 Mar 2020 06:26  Phos  2.9     03-09    TPro  x   /  Alb  2.1<L>  /  TBili  x   /  DBili  x   /  AST  x   /  ALT  x   /  AlkPhos  x   03-09      137  |  108  |  27<H>  ----------------------------<  88  3.8   |  21<L>  |  2.28<H>    Ca    7.9<L>      08 Mar 2020 08:06  Phos  3.5     03-08    TPro  x   /  Alb  2.0<L>  /  TBili  x   /  DBili  x   /  AST  x   /  ALT  x   /  AlkPhos  x   03-08    General: NAD, resting comfortably in bed  R LE:   Dressing C/D/I  03-08  SCDs present bilaterally  Compartments soft and compressible  No calf tenderness bilaterally  +TA/EHL/FHL/GSC  SILT L3-S1  2+ DP/PT

## 2020-03-10 NOTE — PROGRESS NOTE ADULT - ASSESSMENT
A/P:  84y f s/p R JAMESON POD 3    -PT/OT -WBAT, posterior hip precautions, abduction pilllow while sleeping  -Pain Control  -DVT ppx per AC team  -FU AM Labs  -Rest, ice, compress and elevate the extremity as we needed  -Incentive Spirometry  -Medical management appreciated  -Dispo planning  -Will discuss w/ attending and advise if plan changes

## 2020-03-10 NOTE — DISCHARGE NOTE NURSING/CASE MANAGEMENT/SOCIAL WORK - PATIENT PORTAL LINK FT
You can access the FollowMyHealth Patient Portal offered by [facility] by registering at the following website: [email or web address]. By joining [employer]’s FollowMyHealth portal, you will also be able to view your health information using other applications (apps) compatible with our system.

## 2020-03-10 NOTE — PROGRESS NOTE ADULT - SUBJECTIVE AND OBJECTIVE BOX
c/c: fall , right hip fracture    HPI: 84F, h/o bladder prolapse, does not follow regularly with physicians who presented to the ER with inability to ambulate after a fall. She hadn't been feeling well for the past few days with rhinitis. She got up to let her dog out, felt dizzy and fell the night prior to admission. She wasn't able to get up on her own. She dragged herself to the den where her daughter found her around 11pm last night. She helped her into a more comfortable position and slept there overnight. She was brought to the ER the following day and found to have RIght hip fracture. She was found to have creatinine of 3 with no prior known h/o renal disease. Sono showed b/l hydro and bladder distension. She was seen by urology and had nuñez placed. seen by cardiology, underwent echo and cleared for surgery  Underwent right hip replacement 3/7    3/9: pt seen and examined this am. Felt well. No acute overnight events. Had some soreness to surgical site. No sob/chest pain.  3/10 doing good, wants to go home    Review of system- All 10 systems reviewed and is as per HPI otherwise negative.     Vital Signs Last 24 Hrs  T(C): 37.7 (10 Mar 2020 11:29), Max: 37.7 (10 Mar 2020 11:29)  T(F): 99.8 (10 Mar 2020 11:29), Max: 99.8 (10 Mar 2020 11:29)  HR: 92 (10 Mar 2020 11:29) (87 - 96)  BP: 116/72 (10 Mar 2020 11:29) (103/62 - 116/72)  BP(mean): --  RR: 16 (10 Mar 2020 11:29) (16 - 16)  SpO2: 97% (10 Mar 2020 11:29) (95% - 97%)    PHYSICAL EXAM:  GENERAL: Comfortable, no acute distress  HEAD:  Atraumatic, Normocephalic  EYES: EOMI, PERRLA  HEENT: Moist mucous membranes  NECK: Supple, No JVD  NERVOUS SYSTEM:  Alert & Oriented X3,non focal  CHEST/LUNG: Clear to auscultation bilaterally  HEART: Regular rate and rhythm; No murmurs, rubs, or gallops  ABDOMEN: Soft, Nontender, Nondistended; Bowel sounds present  GENITOURINARY- nuñez+  EXTREMITIES:  No clubbing, cyanosis, or edema  MUSCULOSKELETAL- right hip dressing intact.  SKIN-no rash    LABS:                                8.4    8.18  )-----------( 274      ( 09 Mar 2020 06:26 )             26.7     09 Mar 2020 06:26    137    |  108    |  23     ----------------------------<  102    3.8     |  22     |  2.21     Ca    8.2        09 Mar 2020 06:26  Phos  2.9       09 Mar 2020 06:26    TPro  x      /  Alb  2.1    /  TBili  x      /  DBili  x      /  AST  x      /  ALT  x      /  AlkPhos  x      09 Mar 2020 06:26    LIVER FUNCTIONS - ( 09 Mar 2020 06:26 )  Alb: 2.1 g/dL / Pro: x     / ALK PHOS: x     / ALT: x     / AST: x     / GGT: x           MEDICATIONS  (STANDING):  apixaban 2.5 milliGRAM(s) Oral two times a day  lactated ringers. 1000 milliLiter(s) (75 mL/Hr) IV Continuous <Continuous>  polyethylene glycol 3350 17 Gram(s) Oral daily    MEDICATIONS  (PRN):  acetaminophen   Tablet .. 650 milliGRAM(s) Oral every 6 hours PRN Temp greater or equal to 38C (100.4F), Mild Pain (1 - 3)  aluminum hydroxide/magnesium hydroxide/simethicone Suspension 30 milliLiter(s) Oral four times a day PRN Indigestion  bisacodyl Suppository 10 milliGRAM(s) Rectal daily PRN If no bowel movement by POD#2  HYDROmorphone  Injectable 0.5 milliGRAM(s) SubCutaneous every 4 hours PRN Severe Pain (7 - 10)  magnesium hydroxide Suspension 30 milliLiter(s) Oral three times a day PRN Constipation  ondansetron Injectable 4 milliGRAM(s) IV Push every 6 hours PRN Nausea and/or Vomiting  oxyCODONE    IR 2.5 milliGRAM(s) Oral every 4 hours PRN Mild Pain (1 - 3)  oxyCODONE    IR 5 milliGRAM(s) Oral every 4 hours PRN Moderate Pain (4 - 6)  senna 2 Tablet(s) Oral at bedtime PRN Constipation    ASSESSMENT AND PLAN:  83F, pmh as above a/w:    1. Fall/Right hip fracture:  -S/P right hip replacement   -pain control  -physical therapy  -incentive spirometry  -bowel regimen.     2. Acute blood loss anemia:  -due to surgery/post op hematoma.  -monitor h/h    3. Leukocytosis:  -likely reactive to fall/fracture  -resolved    4. FRANCIS vs CKD:  -b/l hydro on sono.  -nuñez placed per urology  -nephrology eval appreciated  -improving slowly.   -to dc with nuñez per urology and f/u in office as outpt.  -outpatient nephro f/u    5. Abnormal EKG:  -trops neg  -echo noted  -seen by cardio , outpt f/u    6. DVT px  -eliquis    7. Dispo- home with home PT. DC time 45 mins. D/w pt, ortho team

## 2020-03-11 PROBLEM — Z78.9 OTHER SPECIFIED HEALTH STATUS: Chronic | Status: ACTIVE | Noted: 2020-03-06

## 2020-03-11 LAB
CULTURE RESULTS: SIGNIFICANT CHANGE UP
SPECIMEN SOURCE: SIGNIFICANT CHANGE UP

## 2020-03-11 RX ORDER — PANTOPRAZOLE SODIUM 20 MG/1
1 TABLET, DELAYED RELEASE ORAL
Qty: 30 | Refills: 0
Start: 2020-03-11 | End: 2020-04-09

## 2020-03-14 DIAGNOSIS — S00.03XA CONTUSION OF SCALP, INITIAL ENCOUNTER: ICD-10-CM
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
## 2020-03-14 DIAGNOSIS — I12.9 HYPERTENSIVE CHRONIC KIDNEY DISEASE WITH STAGE 1 THROUGH STAGE 4 CHRONIC KIDNEY DISEASE, OR UNSPECIFIED CHRONIC KIDNEY DISEASE: ICD-10-CM

## 2020-03-14 DIAGNOSIS — N13.6 PYONEPHROSIS: ICD-10-CM

## 2020-03-14 DIAGNOSIS — D62 ACUTE POSTHEMORRHAGIC ANEMIA: ICD-10-CM

## 2020-03-14 DIAGNOSIS — S72.001A FRACTURE OF UNSPECIFIED PART OF NECK OF RIGHT FEMUR, INITIAL ENCOUNTER FOR CLOSED FRACTURE: ICD-10-CM

## 2020-03-14 DIAGNOSIS — Z79.82 LONG TERM (CURRENT) USE OF ASPIRIN: ICD-10-CM

## 2020-03-14 DIAGNOSIS — D72.829 ELEVATED WHITE BLOOD CELL COUNT, UNSPECIFIED: ICD-10-CM

## 2020-03-14 DIAGNOSIS — N99.0 POSTPROCEDURAL (ACUTE) (CHRONIC) KIDNEY FAILURE: ICD-10-CM
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
## 2020-03-14 DIAGNOSIS — N18.9 CHRONIC KIDNEY DISEASE, UNSPECIFIED: ICD-10-CM

## 2020-03-18 PROBLEM — Z00.00 ENCOUNTER FOR PREVENTIVE HEALTH EXAMINATION: Status: ACTIVE | Noted: 2020-03-18

## 2020-03-20 ENCOUNTER — APPOINTMENT (OUTPATIENT)
Dept: UROLOGY | Facility: CLINIC | Age: 85
End: 2020-03-20

## 2020-04-02 ENCOUNTER — APPOINTMENT (OUTPATIENT)
Dept: UROLOGY | Facility: CLINIC | Age: 85
End: 2020-04-02
Payer: MEDICARE

## 2020-04-02 PROCEDURE — 99213 OFFICE O/P EST LOW 20 MIN: CPT | Mod: 25

## 2020-04-02 PROCEDURE — 51702 INSERT TEMP BLADDER CATH: CPT

## 2020-04-02 NOTE — REASON FOR VISIT
[Initial Visit ___] : [unfilled] is here today for an initial visit  for [unfilled] [Follow-up Visit ___] : a follow-up visit  for [unfilled] [Family Member] : family member

## 2020-04-03 ENCOUNTER — APPOINTMENT (OUTPATIENT)
Dept: UROLOGY | Facility: CLINIC | Age: 85
End: 2020-04-03

## 2020-04-07 NOTE — PHYSICAL EXAM

## 2020-04-07 NOTE — ASSESSMENT
[FreeTextEntry1] : This patient has a history of chronic urinary retention and bladder prolapse. She is here for a catheter change and will follow up monthly for catheter changes.

## 2020-04-07 NOTE — HISTORY OF PRESENT ILLNESS
[FreeTextEntry1] : This patient presents for a catheter change. She was seen in the hospital for urinary retention with hydronephrosis and increasing creatinine. She has a history of bladder prolapse and reports her symptoms have significantly improved after nuñez placement in the hospital.

## 2020-05-04 ENCOUNTER — APPOINTMENT (OUTPATIENT)
Dept: UROLOGY | Facility: CLINIC | Age: 85
End: 2020-05-04
Payer: MEDICARE

## 2020-05-04 VITALS
TEMPERATURE: 97.8 F | WEIGHT: 130 LBS | BODY MASS INDEX: 20.89 KG/M2 | HEIGHT: 66 IN | SYSTOLIC BLOOD PRESSURE: 112 MMHG | HEART RATE: 86 BPM | DIASTOLIC BLOOD PRESSURE: 81 MMHG

## 2020-05-04 PROCEDURE — 51702 INSERT TEMP BLADDER CATH: CPT

## 2020-06-04 ENCOUNTER — APPOINTMENT (OUTPATIENT)
Dept: UROLOGY | Facility: CLINIC | Age: 85
End: 2020-06-04
Payer: MEDICARE

## 2020-06-04 VITALS — TEMPERATURE: 98.3 F

## 2020-06-04 PROCEDURE — 51702 INSERT TEMP BLADDER CATH: CPT

## 2020-06-16 ENCOUNTER — APPOINTMENT (OUTPATIENT)
Dept: UROLOGY | Facility: CLINIC | Age: 85
End: 2020-06-16
Payer: MEDICARE

## 2020-06-16 PROCEDURE — 51702 INSERT TEMP BLADDER CATH: CPT

## 2020-07-01 ENCOUNTER — APPOINTMENT (OUTPATIENT)
Dept: UROLOGY | Facility: CLINIC | Age: 85
End: 2020-07-01

## 2020-07-16 ENCOUNTER — APPOINTMENT (OUTPATIENT)
Dept: UROLOGY | Facility: CLINIC | Age: 85
End: 2020-07-16
Payer: MEDICARE

## 2020-07-16 PROCEDURE — 51702 INSERT TEMP BLADDER CATH: CPT

## 2020-08-17 ENCOUNTER — APPOINTMENT (OUTPATIENT)
Dept: UROLOGY | Facility: CLINIC | Age: 85
End: 2020-08-17
Payer: MEDICARE

## 2020-08-17 PROCEDURE — 51702 INSERT TEMP BLADDER CATH: CPT

## 2020-09-18 ENCOUNTER — APPOINTMENT (OUTPATIENT)
Dept: UROLOGY | Facility: CLINIC | Age: 85
End: 2020-09-18
Payer: MEDICARE

## 2020-09-18 PROCEDURE — 51702 INSERT TEMP BLADDER CATH: CPT

## 2020-10-02 NOTE — ED ADULT NURSE NOTE - RESPIRATORY ASSESSMENT
[de-identified] : pt with some mild subjective hearing loss, feel kirby snot limit function much, family concerned and wanted him to get an hearing test\par no Vertigo, tinnitus, pain, drainage or facial weakness.\par feels is b/l and long standing\par + hx loud noise exposure in the [employer]  [FreeTextEntry1] : Pt states hearing is stable since the last time he was here, not gettng worse \par doesn’t think serial exam are needed since hearing has not changed. \par +changes in hearing might be slightly more difficult due to the masks \par doesn’t effect his function at all, he is able to hear well enough \par for example if he is training with his  he usually has no problem\par no Vertigo, tinnitus, pain, drainage or facial weakness.\par  - - -

## 2020-10-16 ENCOUNTER — APPOINTMENT (OUTPATIENT)
Dept: UROLOGY | Facility: CLINIC | Age: 85
End: 2020-10-16
Payer: MEDICARE

## 2020-10-16 PROCEDURE — 51702 INSERT TEMP BLADDER CATH: CPT

## 2020-11-13 ENCOUNTER — APPOINTMENT (OUTPATIENT)
Dept: UROLOGY | Facility: CLINIC | Age: 85
End: 2020-11-13
Payer: MEDICARE

## 2020-11-13 PROCEDURE — 51702 INSERT TEMP BLADDER CATH: CPT

## 2020-12-11 ENCOUNTER — APPOINTMENT (OUTPATIENT)
Dept: UROLOGY | Facility: CLINIC | Age: 85
End: 2020-12-11
Payer: MEDICARE

## 2020-12-11 PROCEDURE — 51702 INSERT TEMP BLADDER CATH: CPT

## 2021-01-15 ENCOUNTER — APPOINTMENT (OUTPATIENT)
Dept: UROLOGY | Facility: CLINIC | Age: 86
End: 2021-01-15
Payer: MEDICARE

## 2021-01-15 PROCEDURE — 51702 INSERT TEMP BLADDER CATH: CPT

## 2021-02-12 ENCOUNTER — APPOINTMENT (OUTPATIENT)
Dept: UROLOGY | Facility: CLINIC | Age: 86
End: 2021-02-12
Payer: MEDICARE

## 2021-02-12 PROCEDURE — 51702 INSERT TEMP BLADDER CATH: CPT

## 2021-03-12 ENCOUNTER — APPOINTMENT (OUTPATIENT)
Dept: UROLOGY | Facility: CLINIC | Age: 86
End: 2021-03-12
Payer: MEDICARE

## 2021-03-12 VITALS
HEART RATE: 82 BPM | DIASTOLIC BLOOD PRESSURE: 82 MMHG | SYSTOLIC BLOOD PRESSURE: 136 MMHG | TEMPERATURE: 97.6 F | OXYGEN SATURATION: 97 % | WEIGHT: 140 LBS | RESPIRATION RATE: 16 BRPM | HEIGHT: 66 IN | BODY MASS INDEX: 22.5 KG/M2

## 2021-03-12 DIAGNOSIS — N31.2 FLACCID NEUROPATHIC BLADDER, NOT ELSEWHERE CLASSIFIED: ICD-10-CM

## 2021-03-12 PROCEDURE — 99213 OFFICE O/P EST LOW 20 MIN: CPT | Mod: 25

## 2021-03-12 PROCEDURE — 51702 INSERT TEMP BLADDER CATH: CPT

## 2021-03-12 NOTE — HISTORY OF PRESENT ILLNESS
[FreeTextEntry1] : This patient is here for catheter change today but notes discomfort where the catheter enters the urethra.

## 2021-03-12 NOTE — END OF VISIT
[FreeTextEntry3] : I recommended the use of vitamin a and D ointment to be applied after bathing and drying daily.  She will let us know if this does not suffice

## 2021-03-12 NOTE — PHYSICAL EXAM
[General Appearance - Well Developed] : well developed [General Appearance - Well Nourished] : well nourished [Normal Appearance] : normal appearance [Well Groomed] : well groomed [General Appearance - In No Acute Distress] : no acute distress [Abdomen Soft] : soft [Abdomen Tenderness] : non-tender [Costovertebral Angle Tenderness] : no ~M costovertebral angle tenderness [Urinary Bladder Findings] : the bladder was normal on palpation [FreeTextEntry1] : nuñez draining yellow catheter/vaginal atrophy with friction of the Nuñez probably the cause of her discomfort [Edema] : no peripheral edema [] : no respiratory distress [Respiration, Rhythm And Depth] : normal respiratory rhythm and effort [Exaggerated Use Of Accessory Muscles For Inspiration] : no accessory muscle use [Oriented To Time, Place, And Person] : oriented to person, place, and time [Affect] : the affect was normal [Mood] : the mood was normal [Not Anxious] : not anxious [Normal Station and Gait] : the gait and station were normal for the patient's age [No Focal Deficits] : no focal deficits [No Palpable Adenopathy] : no palpable adenopathy

## 2021-03-12 NOTE — ASSESSMENT
[FreeTextEntry1] : The physical presence of the Rosario is no doubt causing some irritation to the vaginal walls

## 2021-04-16 ENCOUNTER — APPOINTMENT (OUTPATIENT)
Dept: UROLOGY | Facility: CLINIC | Age: 86
End: 2021-04-16
Payer: MEDICARE

## 2021-04-16 PROCEDURE — 51702 INSERT TEMP BLADDER CATH: CPT

## 2021-05-14 ENCOUNTER — APPOINTMENT (OUTPATIENT)
Dept: UROLOGY | Facility: CLINIC | Age: 86
End: 2021-05-14
Payer: MEDICARE

## 2021-05-14 PROCEDURE — 51702 INSERT TEMP BLADDER CATH: CPT

## 2021-06-16 ENCOUNTER — APPOINTMENT (OUTPATIENT)
Dept: UROLOGY | Facility: CLINIC | Age: 86
End: 2021-06-16
Payer: MEDICARE

## 2021-06-16 VITALS
DIASTOLIC BLOOD PRESSURE: 82 MMHG | OXYGEN SATURATION: 97 % | BODY MASS INDEX: 22.66 KG/M2 | WEIGHT: 141 LBS | HEART RATE: 78 BPM | HEIGHT: 66 IN | SYSTOLIC BLOOD PRESSURE: 127 MMHG

## 2021-06-16 PROCEDURE — 51702 INSERT TEMP BLADDER CATH: CPT

## 2021-07-14 ENCOUNTER — APPOINTMENT (OUTPATIENT)
Dept: UROLOGY | Facility: CLINIC | Age: 86
End: 2021-07-14
Payer: MEDICARE

## 2021-07-14 PROCEDURE — 51702 INSERT TEMP BLADDER CATH: CPT

## 2021-08-16 ENCOUNTER — APPOINTMENT (OUTPATIENT)
Dept: UROLOGY | Facility: CLINIC | Age: 86
End: 2021-08-16
Payer: MEDICARE

## 2021-08-16 PROCEDURE — 51702 INSERT TEMP BLADDER CATH: CPT

## 2021-09-16 ENCOUNTER — APPOINTMENT (OUTPATIENT)
Dept: UROLOGY | Facility: CLINIC | Age: 86
End: 2021-09-16
Payer: MEDICARE

## 2021-09-16 PROCEDURE — 51702 INSERT TEMP BLADDER CATH: CPT

## 2021-10-14 ENCOUNTER — APPOINTMENT (OUTPATIENT)
Dept: UROLOGY | Facility: CLINIC | Age: 86
End: 2021-10-14

## 2021-10-19 ENCOUNTER — APPOINTMENT (OUTPATIENT)
Dept: UROLOGY | Facility: CLINIC | Age: 86
End: 2021-10-19
Payer: MEDICARE

## 2021-10-19 PROCEDURE — 51702 INSERT TEMP BLADDER CATH: CPT

## 2021-10-19 RX ORDER — CEFUROXIME AXETIL 500 MG/1
500 TABLET ORAL
Qty: 10 | Refills: 0 | Status: ACTIVE | COMMUNITY
Start: 2021-09-17 | End: 1900-01-01

## 2021-10-19 RX ORDER — NITROFURANTOIN (MONOHYDRATE/MACROCRYSTALS) 25; 75 MG/1; MG/1
100 CAPSULE ORAL TWICE DAILY
Qty: 10 | Refills: 0 | Status: DISCONTINUED | COMMUNITY
Start: 2021-09-16 | End: 2021-10-19

## 2021-11-19 ENCOUNTER — APPOINTMENT (OUTPATIENT)
Dept: UROLOGY | Facility: CLINIC | Age: 86
End: 2021-11-19
Payer: MEDICARE

## 2021-11-19 PROCEDURE — 51702 INSERT TEMP BLADDER CATH: CPT

## 2021-12-14 ENCOUNTER — APPOINTMENT (OUTPATIENT)
Dept: UROLOGY | Facility: CLINIC | Age: 86
End: 2021-12-14
Payer: MEDICARE

## 2021-12-14 PROCEDURE — 51702 INSERT TEMP BLADDER CATH: CPT

## 2022-01-01 NOTE — ED ADULT TRIAGE NOTE - CHIEF COMPLAINT QUOTE
Pt. to ED BIBA S/P Unwitnessed Fall. Pt. states she tripped and fell and landed on right hip. + Small hematoma in front of head. Denies LOC, Aspirin, Blood Thinners and major medical hx.  GSC 15- Denies CP, SOB, Abdominal injuries. Pt. does not meet Criteria for TA at this time. First Trimester Sonogram

## 2022-01-11 ENCOUNTER — APPOINTMENT (OUTPATIENT)
Dept: UROLOGY | Facility: CLINIC | Age: 87
End: 2022-01-11
Payer: MEDICARE

## 2022-01-11 VITALS
SYSTOLIC BLOOD PRESSURE: 130 MMHG | WEIGHT: 135 LBS | DIASTOLIC BLOOD PRESSURE: 80 MMHG | BODY MASS INDEX: 22.49 KG/M2 | HEART RATE: 70 BPM | HEIGHT: 65 IN | OXYGEN SATURATION: 96 %

## 2022-01-11 PROCEDURE — 51702 INSERT TEMP BLADDER CATH: CPT

## 2022-02-08 NOTE — PHYSICAL EXAM
[General Appearance - Well Developed] : well developed [General Appearance - Well Nourished] : well nourished [Normal Appearance] : normal appearance [Well Groomed] : well groomed [General Appearance - In No Acute Distress] : no acute distress [Edema] : no peripheral edema [Respiration, Rhythm And Depth] : normal respiratory rhythm and effort [Exaggerated Use Of Accessory Muscles For Inspiration] : no accessory muscle use [Abdomen Soft] : soft [Abdomen Tenderness] : non-tender [Costovertebral Angle Tenderness] : no ~M costovertebral angle tenderness [Urinary Bladder Findings] : the bladder was normal on palpation [Normal Station and Gait] : the gait and station were normal for the patient's age [] : no rash [No Focal Deficits] : no focal deficits [Oriented To Time, Place, And Person] : oriented to person, place, and time [Affect] : the affect was normal [Mood] : the mood was normal [Not Anxious] : not anxious [No Palpable Adenopathy] : no palpable adenopathy [FreeTextEntry1] : nuñez draining yellow catheter Methotrexate Pregnancy And Lactation Text: This medication is Pregnancy Category X and is known to cause fetal harm. This medication is excreted in breast milk.

## 2022-02-17 ENCOUNTER — APPOINTMENT (OUTPATIENT)
Dept: UROLOGY | Facility: CLINIC | Age: 87
End: 2022-02-17
Payer: MEDICARE

## 2022-02-17 PROCEDURE — 51702 INSERT TEMP BLADDER CATH: CPT

## 2022-03-16 ENCOUNTER — APPOINTMENT (OUTPATIENT)
Dept: UROLOGY | Facility: CLINIC | Age: 87
End: 2022-03-16
Payer: MEDICARE

## 2022-03-16 PROCEDURE — 51702 INSERT TEMP BLADDER CATH: CPT

## 2022-04-13 ENCOUNTER — APPOINTMENT (OUTPATIENT)
Dept: UROLOGY | Facility: CLINIC | Age: 87
End: 2022-04-13
Payer: MEDICARE

## 2022-04-13 PROCEDURE — 51702 INSERT TEMP BLADDER CATH: CPT

## 2022-05-13 ENCOUNTER — APPOINTMENT (OUTPATIENT)
Dept: UROLOGY | Facility: CLINIC | Age: 87
End: 2022-05-13
Payer: MEDICARE

## 2022-05-13 PROCEDURE — 51702 INSERT TEMP BLADDER CATH: CPT

## 2022-05-31 NOTE — H&P ADULT - NSHPLABSRESULTS_GEN_ALL_CORE
LABS:                        11.2   18.22 )-----------( 315      ( 06 Mar 2020 10:01 )             34.6     03-06    135  |  107  |  34<H>  ----------------------------<  115<H>  3.5   |  19<L>  |  3.11<H>    Ca    9.0      06 Mar 2020 10:01    TPro  8.3  /  Alb  3.0<L>  /  TBili  0.8  /  DBili  x   /  AST  19  /  ALT  13  /  AlkPhos  113  03-06    PT/INR - ( 06 Mar 2020 10:01 )   PT: 13.3 sec;   INR: 1.19 ratio         PTT - ( 06 Mar 2020 10:01 )  PTT:26.3 sec  Urinalysis Basic - ( 06 Mar 2020 11:55 )    Color: Yellow / Appearance: very cloudy / S.010 / pH: x  Gluc: x / Ketone: Negative  / Bili: Negative / Urobili: Negative mg/dL   Blood: x / Protein: 30 mg/dL / Nitrite: Negative   Leuk Esterase: Moderate / RBC: 6-10 /HPF / WBC 3-5   Sq Epi: x / Non Sq Epi: Occasional / Bacteria: TNTC    CARDIAC MARKERS ( 06 Mar 2020 10:01 )  x     / x     / 189 U/L / x     / x        EKG: sinus, poor baseline, no acute ischemic changes.
Strong peripheral pulses

## 2022-06-13 ENCOUNTER — APPOINTMENT (OUTPATIENT)
Dept: UROLOGY | Facility: CLINIC | Age: 87
End: 2022-06-13
Payer: MEDICARE

## 2022-06-13 PROCEDURE — 51702 INSERT TEMP BLADDER CATH: CPT

## 2022-07-12 ENCOUNTER — APPOINTMENT (OUTPATIENT)
Dept: UROLOGY | Facility: CLINIC | Age: 87
End: 2022-07-12

## 2022-07-12 PROCEDURE — 51702 INSERT TEMP BLADDER CATH: CPT

## 2022-08-12 ENCOUNTER — APPOINTMENT (OUTPATIENT)
Dept: UROLOGY | Facility: CLINIC | Age: 87
End: 2022-08-12

## 2022-08-12 PROCEDURE — 51702 INSERT TEMP BLADDER CATH: CPT

## 2022-09-15 ENCOUNTER — APPOINTMENT (OUTPATIENT)
Dept: UROLOGY | Facility: CLINIC | Age: 87
End: 2022-09-15

## 2022-09-15 PROCEDURE — 51702 INSERT TEMP BLADDER CATH: CPT

## 2022-10-17 ENCOUNTER — APPOINTMENT (OUTPATIENT)
Dept: UROLOGY | Facility: CLINIC | Age: 87
End: 2022-10-17

## 2022-10-17 PROCEDURE — 51702 INSERT TEMP BLADDER CATH: CPT

## 2022-11-17 ENCOUNTER — APPOINTMENT (OUTPATIENT)
Dept: UROLOGY | Facility: CLINIC | Age: 87
End: 2022-11-17

## 2022-11-17 PROCEDURE — 51702 INSERT TEMP BLADDER CATH: CPT

## 2022-12-16 ENCOUNTER — APPOINTMENT (OUTPATIENT)
Dept: UROLOGY | Facility: CLINIC | Age: 87
End: 2022-12-16

## 2022-12-16 PROCEDURE — 51702 INSERT TEMP BLADDER CATH: CPT

## 2023-01-17 ENCOUNTER — APPOINTMENT (OUTPATIENT)
Dept: UROLOGY | Facility: CLINIC | Age: 88
End: 2023-01-17
Payer: MEDICARE

## 2023-01-17 PROCEDURE — 51702 INSERT TEMP BLADDER CATH: CPT

## 2023-02-14 ENCOUNTER — APPOINTMENT (OUTPATIENT)
Dept: UROLOGY | Facility: CLINIC | Age: 88
End: 2023-02-14
Payer: MEDICARE

## 2023-02-14 PROCEDURE — 51702 INSERT TEMP BLADDER CATH: CPT

## 2023-03-14 ENCOUNTER — APPOINTMENT (OUTPATIENT)
Dept: UROLOGY | Facility: CLINIC | Age: 88
End: 2023-03-14
Payer: MEDICARE

## 2023-03-14 PROCEDURE — 51702 INSERT TEMP BLADDER CATH: CPT

## 2023-04-11 ENCOUNTER — APPOINTMENT (OUTPATIENT)
Dept: UROLOGY | Facility: CLINIC | Age: 88
End: 2023-04-11
Payer: MEDICARE

## 2023-04-11 PROCEDURE — 51702 INSERT TEMP BLADDER CATH: CPT

## 2023-05-15 ENCOUNTER — APPOINTMENT (OUTPATIENT)
Dept: UROLOGY | Facility: CLINIC | Age: 88
End: 2023-05-15
Payer: MEDICARE

## 2023-05-15 PROCEDURE — 51702 INSERT TEMP BLADDER CATH: CPT

## 2023-06-16 ENCOUNTER — APPOINTMENT (OUTPATIENT)
Dept: UROLOGY | Facility: CLINIC | Age: 88
End: 2023-06-16
Payer: MEDICARE

## 2023-06-16 PROCEDURE — 51702 INSERT TEMP BLADDER CATH: CPT

## 2023-06-16 RX ORDER — CEPHALEXIN 500 MG/1
500 CAPSULE ORAL
Qty: 14 | Refills: 0 | Status: ACTIVE | COMMUNITY
Start: 2023-06-16 | End: 1900-01-01

## 2023-07-14 ENCOUNTER — APPOINTMENT (OUTPATIENT)
Dept: UROLOGY | Facility: CLINIC | Age: 88
End: 2023-07-14
Payer: MEDICARE

## 2023-07-14 PROCEDURE — 51702 INSERT TEMP BLADDER CATH: CPT

## 2023-08-14 ENCOUNTER — APPOINTMENT (OUTPATIENT)
Dept: UROLOGY | Facility: CLINIC | Age: 88
End: 2023-08-14
Payer: MEDICARE

## 2023-08-14 PROCEDURE — 51702 INSERT TEMP BLADDER CATH: CPT

## 2023-09-12 ENCOUNTER — APPOINTMENT (OUTPATIENT)
Dept: UROLOGY | Facility: CLINIC | Age: 88
End: 2023-09-12
Payer: MEDICARE

## 2023-09-12 PROCEDURE — 51702 INSERT TEMP BLADDER CATH: CPT

## 2023-10-17 ENCOUNTER — APPOINTMENT (OUTPATIENT)
Dept: UROLOGY | Facility: CLINIC | Age: 88
End: 2023-10-17
Payer: MEDICARE

## 2023-10-17 PROCEDURE — 51702 INSERT TEMP BLADDER CATH: CPT

## 2023-11-14 ENCOUNTER — APPOINTMENT (OUTPATIENT)
Dept: UROLOGY | Facility: CLINIC | Age: 88
End: 2023-11-14
Payer: MEDICARE

## 2023-11-14 PROCEDURE — 51702 INSERT TEMP BLADDER CATH: CPT

## 2023-12-15 ENCOUNTER — APPOINTMENT (OUTPATIENT)
Dept: UROLOGY | Facility: CLINIC | Age: 88
End: 2023-12-15
Payer: MEDICARE

## 2023-12-15 PROCEDURE — 51702 INSERT TEMP BLADDER CATH: CPT

## 2024-01-23 ENCOUNTER — APPOINTMENT (OUTPATIENT)
Dept: UROLOGY | Facility: CLINIC | Age: 89
End: 2024-01-23
Payer: MEDICARE

## 2024-01-23 PROCEDURE — 51702 INSERT TEMP BLADDER CATH: CPT

## 2024-02-26 ENCOUNTER — APPOINTMENT (OUTPATIENT)
Dept: UROLOGY | Facility: CLINIC | Age: 89
End: 2024-02-26
Payer: MEDICARE

## 2024-02-26 PROCEDURE — 51702 INSERT TEMP BLADDER CATH: CPT

## 2024-03-26 ENCOUNTER — APPOINTMENT (OUTPATIENT)
Dept: UROLOGY | Facility: CLINIC | Age: 89
End: 2024-03-26
Payer: MEDICARE

## 2024-03-26 PROCEDURE — 51702 INSERT TEMP BLADDER CATH: CPT

## 2024-04-23 ENCOUNTER — APPOINTMENT (OUTPATIENT)
Dept: UROLOGY | Facility: CLINIC | Age: 89
End: 2024-04-23
Payer: MEDICARE

## 2024-04-23 PROCEDURE — 51702 INSERT TEMP BLADDER CATH: CPT

## 2024-05-22 ENCOUNTER — APPOINTMENT (OUTPATIENT)
Dept: UROLOGY | Facility: CLINIC | Age: 89
End: 2024-05-22
Payer: MEDICARE

## 2024-05-22 PROCEDURE — 51702 INSERT TEMP BLADDER CATH: CPT

## 2024-06-25 ENCOUNTER — APPOINTMENT (OUTPATIENT)
Dept: UROLOGY | Facility: CLINIC | Age: 89
End: 2024-06-25
Payer: MEDICARE

## 2024-06-25 DIAGNOSIS — R33.9 RETENTION OF URINE, UNSPECIFIED: ICD-10-CM

## 2024-06-25 PROCEDURE — 51702 INSERT TEMP BLADDER CATH: CPT

## 2024-07-23 ENCOUNTER — APPOINTMENT (OUTPATIENT)
Dept: UROLOGY | Facility: CLINIC | Age: 89
End: 2024-07-23

## 2024-08-01 ENCOUNTER — APPOINTMENT (OUTPATIENT)
Dept: UROLOGY | Facility: CLINIC | Age: 89
End: 2024-08-01
Payer: MEDICARE

## 2024-08-01 DIAGNOSIS — R33.9 RETENTION OF URINE, UNSPECIFIED: ICD-10-CM

## 2024-08-01 PROCEDURE — 51702 INSERT TEMP BLADDER CATH: CPT

## 2024-09-05 ENCOUNTER — APPOINTMENT (OUTPATIENT)
Dept: UROLOGY | Facility: CLINIC | Age: 89
End: 2024-09-05
Payer: MEDICARE

## 2024-09-05 DIAGNOSIS — R33.9 RETENTION OF URINE, UNSPECIFIED: ICD-10-CM

## 2024-09-05 PROCEDURE — 51702 INSERT TEMP BLADDER CATH: CPT

## 2024-10-03 ENCOUNTER — APPOINTMENT (OUTPATIENT)
Dept: UROLOGY | Facility: CLINIC | Age: 89
End: 2024-10-03

## 2024-10-07 ENCOUNTER — APPOINTMENT (OUTPATIENT)
Dept: UROLOGY | Facility: CLINIC | Age: 89
End: 2024-10-07
Payer: MEDICARE

## 2024-10-07 DIAGNOSIS — R33.9 RETENTION OF URINE, UNSPECIFIED: ICD-10-CM

## 2024-10-07 PROCEDURE — 51702 INSERT TEMP BLADDER CATH: CPT

## 2024-11-14 ENCOUNTER — APPOINTMENT (OUTPATIENT)
Dept: UROLOGY | Facility: CLINIC | Age: 89
End: 2024-11-14

## 2024-11-22 ENCOUNTER — APPOINTMENT (OUTPATIENT)
Dept: UROLOGY | Facility: CLINIC | Age: 89
End: 2024-11-22
Payer: MEDICARE

## 2024-11-22 DIAGNOSIS — R33.9 RETENTION OF URINE, UNSPECIFIED: ICD-10-CM

## 2024-11-22 PROCEDURE — 51702 INSERT TEMP BLADDER CATH: CPT

## 2025-01-03 ENCOUNTER — APPOINTMENT (OUTPATIENT)
Dept: UROLOGY | Facility: CLINIC | Age: 89
End: 2025-01-03
Payer: MEDICARE

## 2025-01-03 DIAGNOSIS — R33.9 RETENTION OF URINE, UNSPECIFIED: ICD-10-CM

## 2025-01-03 PROCEDURE — 51702 INSERT TEMP BLADDER CATH: CPT

## 2025-02-03 ENCOUNTER — APPOINTMENT (OUTPATIENT)
Dept: UROLOGY | Facility: CLINIC | Age: 89
End: 2025-02-03
Payer: MEDICARE

## 2025-02-03 DIAGNOSIS — R33.9 RETENTION OF URINE, UNSPECIFIED: ICD-10-CM

## 2025-02-03 PROCEDURE — 51702 INSERT TEMP BLADDER CATH: CPT

## 2025-03-04 ENCOUNTER — APPOINTMENT (OUTPATIENT)
Dept: UROLOGY | Facility: CLINIC | Age: 89
End: 2025-03-04
Payer: MEDICARE

## 2025-03-04 DIAGNOSIS — R33.9 RETENTION OF URINE, UNSPECIFIED: ICD-10-CM

## 2025-03-04 PROCEDURE — 51702 INSERT TEMP BLADDER CATH: CPT

## 2025-04-02 ENCOUNTER — APPOINTMENT (OUTPATIENT)
Dept: UROLOGY | Facility: CLINIC | Age: 89
End: 2025-04-02
Payer: MEDICARE

## 2025-04-02 PROCEDURE — 51702 INSERT TEMP BLADDER CATH: CPT

## 2025-04-03 ENCOUNTER — APPOINTMENT (OUTPATIENT)
Dept: UROLOGY | Facility: CLINIC | Age: 89
End: 2025-04-03
Payer: MEDICARE

## 2025-04-03 DIAGNOSIS — R33.9 RETENTION OF URINE, UNSPECIFIED: ICD-10-CM

## 2025-04-03 PROCEDURE — 51702 INSERT TEMP BLADDER CATH: CPT

## 2025-05-08 ENCOUNTER — APPOINTMENT (OUTPATIENT)
Dept: UROLOGY | Facility: CLINIC | Age: 89
End: 2025-05-08

## 2025-05-08 ENCOUNTER — APPOINTMENT (OUTPATIENT)
Dept: UROLOGY | Facility: CLINIC | Age: 89
End: 2025-05-08
Payer: MEDICARE

## 2025-05-08 DIAGNOSIS — R53.1 WEAKNESS: ICD-10-CM

## 2025-05-08 DIAGNOSIS — R33.9 RETENTION OF URINE, UNSPECIFIED: ICD-10-CM

## 2025-05-08 PROCEDURE — 51702 INSERT TEMP BLADDER CATH: CPT

## 2025-05-09 ENCOUNTER — NON-APPOINTMENT (OUTPATIENT)
Age: 89
End: 2025-05-09

## 2025-05-09 ENCOUNTER — INPATIENT (INPATIENT)
Facility: HOSPITAL | Age: 89
LOS: 4 days | Discharge: HOME | DRG: 690 | End: 2025-05-14
Attending: INTERNAL MEDICINE | Admitting: STUDENT IN AN ORGANIZED HEALTH CARE EDUCATION/TRAINING PROGRAM
Payer: MEDICARE

## 2025-05-09 VITALS
SYSTOLIC BLOOD PRESSURE: 100 MMHG | TEMPERATURE: 99 F | WEIGHT: 108.25 LBS | RESPIRATION RATE: 18 BRPM | HEART RATE: 87 BPM | DIASTOLIC BLOOD PRESSURE: 68 MMHG | OXYGEN SATURATION: 100 %

## 2025-05-09 DIAGNOSIS — E43 UNSPECIFIED SEVERE PROTEIN-CALORIE MALNUTRITION: ICD-10-CM

## 2025-05-09 DIAGNOSIS — R59.0 LOCALIZED ENLARGED LYMPH NODES: ICD-10-CM

## 2025-05-09 DIAGNOSIS — N18.4 CHRONIC KIDNEY DISEASE, STAGE 4 (SEVERE): ICD-10-CM

## 2025-05-09 DIAGNOSIS — R33.8 OTHER RETENTION OF URINE: ICD-10-CM
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
## 2025-05-09 DIAGNOSIS — N18.9 CHRONIC KIDNEY DISEASE, UNSPECIFIED: ICD-10-CM

## 2025-05-09 DIAGNOSIS — F41.9 ANXIETY DISORDER, UNSPECIFIED: ICD-10-CM

## 2025-05-09 DIAGNOSIS — Z79.82 LONG TERM (CURRENT) USE OF ASPIRIN: ICD-10-CM

## 2025-05-09 DIAGNOSIS — D50.9 IRON DEFICIENCY ANEMIA, UNSPECIFIED: ICD-10-CM

## 2025-05-09 DIAGNOSIS — N39.0 URINARY TRACT INFECTION, SITE NOT SPECIFIED: ICD-10-CM

## 2025-05-09 DIAGNOSIS — Z79.01 LONG TERM (CURRENT) USE OF ANTICOAGULANTS: ICD-10-CM

## 2025-05-09 DIAGNOSIS — Z11.52 ENCOUNTER FOR SCREENING FOR COVID-19: ICD-10-CM

## 2025-05-09 DIAGNOSIS — N13.6 PYONEPHROSIS: ICD-10-CM

## 2025-05-09 DIAGNOSIS — Z79.899 OTHER LONG TERM (CURRENT) DRUG THERAPY: ICD-10-CM

## 2025-05-09 DIAGNOSIS — R63.4 ABNORMAL WEIGHT LOSS: ICD-10-CM

## 2025-05-09 DIAGNOSIS — Z91.81 HISTORY OF FALLING: ICD-10-CM

## 2025-05-09 DIAGNOSIS — R93.89 ABNORMAL FINDINGS ON DIAGNOSTIC IMAGING OF OTHER SPECIFIED BODY STRUCTURES: ICD-10-CM

## 2025-05-09 DIAGNOSIS — T83.518A INFECTION AND INFLAMMATORY REACTION DUE TO OTHER URINARY CATHETER, INITIAL ENCOUNTER: ICD-10-CM

## 2025-05-09 LAB
ALBUMIN SERPL ELPH-MCNC: 2 G/DL — LOW (ref 3.3–5)
ALBUMIN SERPL ELPH-MCNC: 2.9 G/DL
ALP BLD-CCNC: 123 U/L
ALP SERPL-CCNC: 116 U/L — SIGNIFICANT CHANGE UP (ref 40–120)
ALT FLD-CCNC: 20 U/L — SIGNIFICANT CHANGE UP (ref 12–78)
ALT SERPL-CCNC: 19 U/L
ANION GAP SERPL CALC-SCNC: 13 MMOL/L
ANION GAP SERPL CALC-SCNC: 8 MMOL/L — SIGNIFICANT CHANGE UP (ref 5–17)
APPEARANCE UR: ABNORMAL
AST SERPL-CCNC: 22 U/L — SIGNIFICANT CHANGE UP (ref 15–37)
AST SERPL-CCNC: 23 U/L
BACTERIA # UR AUTO: ABNORMAL /HPF
BASOPHILS # BLD AUTO: 0.06 K/UL — SIGNIFICANT CHANGE UP (ref 0–0.2)
BASOPHILS # BLD AUTO: 0.07 K/UL
BASOPHILS NFR BLD AUTO: 0.9 % — SIGNIFICANT CHANGE UP (ref 0–2)
BASOPHILS NFR BLD AUTO: 1 %
BILIRUB SERPL-MCNC: 0.2 MG/DL
BILIRUB SERPL-MCNC: 0.3 MG/DL — SIGNIFICANT CHANGE UP (ref 0.2–1.2)
BILIRUB UR-MCNC: NEGATIVE — SIGNIFICANT CHANGE UP
BUN SERPL-MCNC: 29 MG/DL
BUN SERPL-MCNC: 29 MG/DL — HIGH (ref 7–23)
CALCIUM SERPL-MCNC: 8.6 MG/DL
CALCIUM SERPL-MCNC: 9.1 MG/DL — SIGNIFICANT CHANGE UP (ref 8.5–10.1)
CAST: 3 /LPF — SIGNIFICANT CHANGE UP (ref 0–4)
CHLORIDE SERPL-SCNC: 100 MMOL/L
CHLORIDE SERPL-SCNC: 102 MMOL/L — SIGNIFICANT CHANGE UP (ref 96–108)
CO2 SERPL-SCNC: 20 MMOL/L
CO2 SERPL-SCNC: 22 MMOL/L — SIGNIFICANT CHANGE UP (ref 22–31)
COLOR SPEC: YELLOW — SIGNIFICANT CHANGE UP
CREAT SERPL-MCNC: 2.55 MG/DL — HIGH (ref 0.5–1.3)
CREAT SERPL-MCNC: 2.66 MG/DL
DIFF PNL FLD: ABNORMAL
EGFR: 18 ML/MIN/1.73M2 — LOW
EGFR: 18 ML/MIN/1.73M2 — LOW
EGFRCR SERPLBLD CKD-EPI 2021: 17 ML/MIN/1.73M2
EOSINOPHIL # BLD AUTO: 0.07 K/UL
EOSINOPHIL # BLD AUTO: 0.07 K/UL — SIGNIFICANT CHANGE UP (ref 0–0.5)
EOSINOPHIL NFR BLD AUTO: 1 %
EOSINOPHIL NFR BLD AUTO: 1 % — SIGNIFICANT CHANGE UP (ref 0–6)
EPI CELLS # UR: PRESENT
FLUAV AG NPH QL: SIGNIFICANT CHANGE UP
FLUBV AG NPH QL: SIGNIFICANT CHANGE UP
GLUCOSE SERPL-MCNC: 105 MG/DL — HIGH (ref 70–99)
GLUCOSE SERPL-MCNC: 145 MG/DL
GLUCOSE UR QL: NEGATIVE MG/DL — SIGNIFICANT CHANGE UP
HCT VFR BLD CALC: 25.1 % — LOW (ref 34.5–45)
HCT VFR BLD CALC: 26.2 %
HGB BLD-MCNC: 7.5 G/DL
HGB BLD-MCNC: 7.5 G/DL — LOW (ref 11.5–15.5)
IMM GRANULOCYTES # BLD AUTO: 0.07 K/UL — SIGNIFICANT CHANGE UP (ref 0–0.07)
IMM GRANULOCYTES NFR BLD AUTO: 1 % — HIGH (ref 0–0.9)
IMM GRANULOCYTES NFR BLD AUTO: 1.3 %
KETONES UR-MCNC: NEGATIVE MG/DL — SIGNIFICANT CHANGE UP
LACTATE SERPL-SCNC: 0.9 MMOL/L — SIGNIFICANT CHANGE UP (ref 0.7–2)
LEUKOCYTE ESTERASE UR-ACNC: ABNORMAL
LYMPHOCYTES # BLD AUTO: 1.09 K/UL
LYMPHOCYTES # BLD AUTO: 1.18 K/UL — SIGNIFICANT CHANGE UP (ref 1–3.3)
LYMPHOCYTES NFR BLD AUTO: 16.2 %
LYMPHOCYTES NFR BLD AUTO: 17.7 % — SIGNIFICANT CHANGE UP (ref 13–44)
MAGNESIUM SERPL-MCNC: 2.3 MG/DL — SIGNIFICANT CHANGE UP (ref 1.6–2.6)
MAN DIFF?: NORMAL
MCHC RBC-ENTMCNC: 24 PG
MCHC RBC-ENTMCNC: 24.4 PG — LOW (ref 27–34)
MCHC RBC-ENTMCNC: 28.6 G/DL
MCHC RBC-ENTMCNC: 29.9 G/DL — LOW (ref 32–36)
MCV RBC AUTO: 81.8 FL — SIGNIFICANT CHANGE UP (ref 80–100)
MCV RBC AUTO: 84 FL
MONOCYTES # BLD AUTO: 0.59 K/UL — SIGNIFICANT CHANGE UP (ref 0–0.9)
MONOCYTES # BLD AUTO: 0.6 K/UL
MONOCYTES NFR BLD AUTO: 8.8 % — SIGNIFICANT CHANGE UP (ref 2–14)
MONOCYTES NFR BLD AUTO: 8.9 %
NEUTROPHILS # BLD AUTO: 4.7 K/UL — SIGNIFICANT CHANGE UP (ref 1.8–7.4)
NEUTROPHILS # BLD AUTO: 4.81 K/UL
NEUTROPHILS NFR BLD AUTO: 70.6 % — SIGNIFICANT CHANGE UP (ref 43–77)
NEUTROPHILS NFR BLD AUTO: 71.6 %
NITRITE UR-MCNC: POSITIVE
NRBC # BLD AUTO: 0 K/UL — SIGNIFICANT CHANGE UP (ref 0–0)
NRBC # FLD: 0 K/UL — SIGNIFICANT CHANGE UP (ref 0–0)
NRBC BLD AUTO-RTO: 0 /100 WBCS — SIGNIFICANT CHANGE UP (ref 0–0)
PH UR: 5.5 — SIGNIFICANT CHANGE UP (ref 5–8)
PLATELET # BLD AUTO: 602 K/UL
PLATELET # BLD AUTO: 638 K/UL — HIGH (ref 150–400)
PMV BLD: 8.2 FL — SIGNIFICANT CHANGE UP (ref 7–13)
POTASSIUM SERPL-MCNC: 4.2 MMOL/L — SIGNIFICANT CHANGE UP (ref 3.5–5.3)
POTASSIUM SERPL-SCNC: 4.2 MMOL/L — SIGNIFICANT CHANGE UP (ref 3.5–5.3)
POTASSIUM SERPL-SCNC: 4.7 MMOL/L
PROT SERPL-MCNC: 7 G/DL
PROT SERPL-MCNC: 8.1 GM/DL — SIGNIFICANT CHANGE UP (ref 6–8.3)
PROT UR-MCNC: 300 MG/DL
RBC # BLD: 3.07 M/UL — LOW (ref 3.8–5.2)
RBC # BLD: 3.12 M/UL
RBC # FLD: 15.7 %
RBC # FLD: 15.7 % — HIGH (ref 10.3–14.5)
RBC CASTS # UR COMP ASSIST: 122 /HPF — HIGH (ref 0–4)
RSV RNA NPH QL NAA+NON-PROBE: SIGNIFICANT CHANGE UP
SARS-COV-2 RNA SPEC QL NAA+PROBE: SIGNIFICANT CHANGE UP
SODIUM SERPL-SCNC: 132 MMOL/L — LOW (ref 135–145)
SODIUM SERPL-SCNC: 133 MMOL/L
SOURCE RESPIRATORY: SIGNIFICANT CHANGE UP
SP GR SPEC: 1.01 — SIGNIFICANT CHANGE UP (ref 1–1.03)
SQUAMOUS # UR AUTO: 22 /HPF — HIGH (ref 0–5)
TROPONIN I, HIGH SENSITIVITY RESULT: 5.38 NG/L — SIGNIFICANT CHANGE UP
UROBILINOGEN FLD QL: 1 MG/DL — SIGNIFICANT CHANGE UP (ref 0.2–1)
WBC # BLD: 6.67 K/UL — SIGNIFICANT CHANGE UP (ref 3.8–10.5)
WBC # FLD AUTO: 6.67 K/UL — SIGNIFICANT CHANGE UP (ref 3.8–10.5)
WBC # FLD AUTO: 6.73 K/UL
WBC UR QL: >998 /HPF — HIGH (ref 0–5)

## 2025-05-09 PROCEDURE — P9016: CPT

## 2025-05-09 PROCEDURE — 82746 ASSAY OF FOLIC ACID SERUM: CPT

## 2025-05-09 PROCEDURE — 97116 GAIT TRAINING THERAPY: CPT | Mod: GP

## 2025-05-09 PROCEDURE — 36415 COLL VENOUS BLD VENIPUNCTURE: CPT

## 2025-05-09 PROCEDURE — 80053 COMPREHEN METABOLIC PANEL: CPT

## 2025-05-09 PROCEDURE — 86900 BLOOD TYPING SEROLOGIC ABO: CPT

## 2025-05-09 PROCEDURE — 97162 PT EVAL MOD COMPLEX 30 MIN: CPT | Mod: GP

## 2025-05-09 PROCEDURE — 72197 MRI PELVIS W/O & W/DYE: CPT

## 2025-05-09 PROCEDURE — 80061 LIPID PANEL: CPT

## 2025-05-09 PROCEDURE — 99223 1ST HOSP IP/OBS HIGH 75: CPT

## 2025-05-09 PROCEDURE — 71250 CT THORAX DX C-: CPT

## 2025-05-09 PROCEDURE — 85025 COMPLETE CBC W/AUTO DIFF WBC: CPT

## 2025-05-09 PROCEDURE — A9579: CPT

## 2025-05-09 PROCEDURE — 86850 RBC ANTIBODY SCREEN: CPT

## 2025-05-09 PROCEDURE — 73501 X-RAY EXAM HIP UNI 1 VIEW: CPT | Mod: RT

## 2025-05-09 PROCEDURE — 83550 IRON BINDING TEST: CPT

## 2025-05-09 PROCEDURE — 97530 THERAPEUTIC ACTIVITIES: CPT | Mod: GP

## 2025-05-09 PROCEDURE — 93010 ELECTROCARDIOGRAM REPORT: CPT

## 2025-05-09 PROCEDURE — 99285 EMERGENCY DEPT VISIT HI MDM: CPT

## 2025-05-09 PROCEDURE — 85730 THROMBOPLASTIN TIME PARTIAL: CPT

## 2025-05-09 PROCEDURE — 82728 ASSAY OF FERRITIN: CPT

## 2025-05-09 PROCEDURE — 85610 PROTHROMBIN TIME: CPT

## 2025-05-09 PROCEDURE — 80048 BASIC METABOLIC PNL TOTAL CA: CPT

## 2025-05-09 PROCEDURE — 36430 TRANSFUSION BLD/BLD COMPNT: CPT

## 2025-05-09 PROCEDURE — 82607 VITAMIN B-12: CPT

## 2025-05-09 PROCEDURE — 83036 HEMOGLOBIN GLYCOSYLATED A1C: CPT

## 2025-05-09 PROCEDURE — 74183 MRI ABD W/O CNTR FLWD CNTR: CPT

## 2025-05-09 PROCEDURE — 85027 COMPLETE CBC AUTOMATED: CPT

## 2025-05-09 PROCEDURE — 86923 COMPATIBILITY TEST ELECTRIC: CPT

## 2025-05-09 PROCEDURE — 86901 BLOOD TYPING SEROLOGIC RH(D): CPT

## 2025-05-09 PROCEDURE — 83540 ASSAY OF IRON: CPT

## 2025-05-09 PROCEDURE — 70450 CT HEAD/BRAIN W/O DYE: CPT | Mod: 26

## 2025-05-09 PROCEDURE — 74176 CT ABD & PELVIS W/O CONTRAST: CPT

## 2025-05-09 PROCEDURE — 71045 X-RAY EXAM CHEST 1 VIEW: CPT | Mod: 26

## 2025-05-09 RX ORDER — MELATONIN 5 MG
5 TABLET ORAL AT BEDTIME
Refills: 0 | Status: DISCONTINUED | OUTPATIENT
Start: 2025-05-09 | End: 2025-05-14

## 2025-05-09 RX ORDER — CEFTRIAXONE 500 MG/1
1000 INJECTION, POWDER, FOR SOLUTION INTRAMUSCULAR; INTRAVENOUS EVERY 24 HOURS
Refills: 0 | Status: COMPLETED | OUTPATIENT
Start: 2025-05-09 | End: 2025-05-11

## 2025-05-09 RX ORDER — CEFTRIAXONE 500 MG/1
1000 INJECTION, POWDER, FOR SOLUTION INTRAMUSCULAR; INTRAVENOUS ONCE
Refills: 0 | Status: COMPLETED | OUTPATIENT
Start: 2025-05-09 | End: 2025-05-09

## 2025-05-09 RX ADMIN — CEFTRIAXONE 1000 MILLIGRAM(S): 500 INJECTION, POWDER, FOR SOLUTION INTRAMUSCULAR; INTRAVENOUS at 19:34

## 2025-05-09 RX ADMIN — Medication 1000 MILLILITER(S): at 18:55

## 2025-05-09 RX ADMIN — Medication 2000 MILLILITER(S): at 17:14

## 2025-05-09 RX ADMIN — Medication 5 MILLIGRAM(S): at 22:44

## 2025-05-09 NOTE — PATIENT PROFILE ADULT - FALL HARM RISK - HARM RISK INTERVENTIONS

## 2025-05-09 NOTE — ED PROVIDER NOTE - CLINICAL SUMMARY MEDICAL DECISION MAKING FREE TEXT BOX
Presentation concerning for FRANCIS, anemia, dehydration. Plan for EKG, chest XR, labs ua, IV fluids, monitor and reassess. Presentation concerning for FRANCIS, anemia, dehydration. Plan for EKG, chest XR, labs ua, IV fluids, monitor and reassess.  EKG shows NSR, rate 83, normal axis, normal intervals, no BIAT or STD, low voltage QRS (time 1603). CXR no acute infiltrate. Labs show no leukocytosis, trop WNL, Cr 2.5, Hgb 7.5. UA +UTI. Treated with 2L NS and ceftriaxone. Admitted to Dr. roblero hospitalist attending for acute uti, anemia, CKD

## 2025-05-09 NOTE — ED PROVIDER NOTE - OBJECTIVE STATEMENT
89 y/o F with no pertinent PMHx presents to the ED sent by MD Martinez urology. Blood was taken yesterday which showed anemia, low RBC, and potential renal failure. Pt reported that she broke her hip 5 years ago and was put on a Rosario catheter. Rosario last changed yesterday. Pt states that she has been declining since January. Endorses lack of appetite, dehydration, lethargy, and weakness. Denies fevers. Besides yesterday, last blood draw was March 2020.

## 2025-05-09 NOTE — ED ADULT NURSE NOTE - NSFALLHARMRISKINTERV_ED_ALL_ED

## 2025-05-09 NOTE — H&P ADULT - ASSESSMENT
88 y/o F w/ PMH of bladder prolapse, urinary retention s/p chronic nuñez, p/w fatigue    *Anemia / Unintentional weight loss   -Iron panel / ferritin / B12 / Folate  -FOBT  -Trend H/H  -GI consult  -Patient will need to have all age approriate malignancy screening initiated. Given unintentional weight loss, will order CT chest / abdomen as well.     *Chronic sinusitis - possible fungal colonization?   -CT: Evidence of chronic left maxillary sinusitis with internal hyperdensity-calcification; the latter of which can be seen in the setting of inspissated secretions as well as fungal colonization.   -Will consult ID regarding possible need for Anti-fungal  -ENT Referral     *UTI  -Ceftriaxone  -F/u urine cx    *Questionable FRANCIS vs CKD?  -F/u CT Abd   -Avoid nephrotoxic agents  -I/Os  -Nephro consult     *DVT ppx  -SCDs       >75 mins required for admission  88 y/o F w/ PMH of bladder prolapse, urinary retention s/p chronic nuñez, p/w fatigue    *Anemia / Unintentional weight loss - r/o Malignancy   -Iron panel / ferritin / B12 / Folate  -FOBT  -Trend H/H  -S/p IVF   -GI consult  -Patient will need to have all age approriate malignancy screening initiated. Given unintentional weight loss, will order CT chest / abdomen as well.     *Chronic sinusitis - possible fungal colonization?   -CT: Evidence of chronic left maxillary sinusitis with internal hyperdensity-calcification; the latter of which can be seen in the setting of inspissated secretions as well as fungal colonization.   -Will consult ID regarding possible need for Anti-fungal  -ENT Referral     *UTI  -Ceftriaxone  -F/u urine cx    *Questionable FRANCIS vs CKD?  -F/u CT Abd   -Avoid nephrotoxic agents  -I/Os  -Nephro consult     *DVT ppx  -SCDs       >75 mins required for admission

## 2025-05-09 NOTE — ED ADULT NURSE NOTE - OBJECTIVE STATEMENT
pt presents to er sent by MD for low HGB and FRANCIS on labwork done outpatient. pt has chronic Rosario, replaced yesterday by Dr. Martinez. pt presents to er sent by MD for low HGB and FRANCIS on labwork done outpatient. pt has chronic Nuñez, replaced yesterday by Dr. Martinez. pt reports she does not always empty nuñez bag when it is full. pt does not regularly see doctor, does not have PCP, last bloodwork was done 2020. pt reports having decreased appetite, fatigue, weakness.

## 2025-05-09 NOTE — ED ADULT TRIAGE NOTE - CHIEF COMPLAINT QUOTE
pt sent to ED by MD [facility] u[facility]. chronic nuñez. blood work was drawn yesterday outpatient and reports results show low RBC and FRANCIS per the daugher. daughter states she has been declining/ c/o  SOB and lethargy for over a month. endorses lack of hydration.

## 2025-05-09 NOTE — ED PROVIDER NOTE - PHYSICAL EXAMINATION
Constitutional: well appearing, NAD AAOx3. Thin.  Eyes: EOMI, PERRL  Head: Normocephalic atraumatic  Mouth: no airway obstruction, posterior oropharynx clear without erythema or exudate  Neck: supple  Cardiac: regular rate and rhythm, no MRG  Resp: Lungs CTAB  GI: Abd s/nt/nd  Neuro: CN2-12 intact, strength 5/5x4, sensation grossly intact  Skin: No rashes

## 2025-05-09 NOTE — H&P ADULT - HISTORY OF PRESENT ILLNESS
88 y/o F w/ PMH of bladder prolapse, urinary retention s/p chronic nuñez, p/w fatigue. Patient states that she has been very fatigued over the last few days. Even walking a short distance she feels very winded. States that has been having decreased appetite over the last month, and has had unintenitonal weight loss of about 20 lbs. She denies any hematochezia / melena / nausea / vomiting / abdominal pain. Patient states that she doesn't have a PCP, and doesn't follow up with any doctors outpatient, except for her urologist. Denies having a colonoscopy in the past.     PSH: THR     Social Hx: Denies tobacco / etoh / drugs     Family Hx:  Denies any family hx

## 2025-05-09 NOTE — ED ADULT NURSE REASSESSMENT NOTE - NS ED NURSE REASSESS COMMENT FT1
pt urine sample to be collected without changing nuñez per MD Figueredo. pt has chronic Nuñez, replaced yesterday by Dr. Martinez.

## 2025-05-09 NOTE — H&P ADULT - CONVERSATION DETAILS
Patient states HCP is her daughter Brandin, and she denies having any other advance care directives limiting resuscitation status for this admission

## 2025-05-10 LAB
A1C WITH ESTIMATED AVERAGE GLUCOSE RESULT: 6.4 % — HIGH (ref 4–5.6)
ALBUMIN SERPL ELPH-MCNC: 1.7 G/DL — LOW (ref 3.3–5)
ALP SERPL-CCNC: 99 U/L — SIGNIFICANT CHANGE UP (ref 40–120)
ALT FLD-CCNC: 14 U/L — SIGNIFICANT CHANGE UP (ref 12–78)
ANION GAP SERPL CALC-SCNC: 6 MMOL/L — SIGNIFICANT CHANGE UP (ref 5–17)
APTT BLD: 25.5 SEC — LOW (ref 26.1–36.8)
AST SERPL-CCNC: 17 U/L — SIGNIFICANT CHANGE UP (ref 15–37)
BASOPHILS # BLD AUTO: 0.08 K/UL — SIGNIFICANT CHANGE UP (ref 0–0.2)
BASOPHILS NFR BLD AUTO: 1.3 % — SIGNIFICANT CHANGE UP (ref 0–2)
BILIRUB SERPL-MCNC: 0.2 MG/DL — SIGNIFICANT CHANGE UP (ref 0.2–1.2)
BLD GP AB SCN SERPL QL: SIGNIFICANT CHANGE UP
BUN SERPL-MCNC: 23 MG/DL — SIGNIFICANT CHANGE UP (ref 7–23)
CALCIUM SERPL-MCNC: 8.8 MG/DL — SIGNIFICANT CHANGE UP (ref 8.5–10.1)
CHLORIDE SERPL-SCNC: 104 MMOL/L — SIGNIFICANT CHANGE UP (ref 96–108)
CHOLEST SERPL-MCNC: 101 MG/DL — SIGNIFICANT CHANGE UP
CO2 SERPL-SCNC: 22 MMOL/L — SIGNIFICANT CHANGE UP (ref 22–31)
CREAT SERPL-MCNC: 2.29 MG/DL — HIGH (ref 0.5–1.3)
EGFR: 20 ML/MIN/1.73M2 — LOW
EGFR: 20 ML/MIN/1.73M2 — LOW
EOSINOPHIL # BLD AUTO: 0.11 K/UL — SIGNIFICANT CHANGE UP (ref 0–0.5)
EOSINOPHIL NFR BLD AUTO: 1.7 % — SIGNIFICANT CHANGE UP (ref 0–6)
ESTIMATED AVERAGE GLUCOSE: 137 MG/DL — HIGH (ref 68–114)
FERRITIN SERPL-MCNC: 331 NG/ML — HIGH (ref 13–330)
FOLATE SERPL-MCNC: 8.6 NG/ML — SIGNIFICANT CHANGE UP
GLUCOSE SERPL-MCNC: 142 MG/DL — HIGH (ref 70–99)
HCT VFR BLD CALC: 21.2 % — LOW (ref 34.5–45)
HDLC SERPL-MCNC: 30 MG/DL — LOW
HGB BLD-MCNC: 6.4 G/DL — CRITICAL LOW (ref 11.5–15.5)
IMM GRANULOCYTES # BLD AUTO: 0.07 K/UL — SIGNIFICANT CHANGE UP (ref 0–0.07)
IMM GRANULOCYTES NFR BLD AUTO: 1.1 % — HIGH (ref 0–0.9)
INR BLD: 1.08 RATIO — SIGNIFICANT CHANGE UP (ref 0.85–1.16)
IRON SATN MFR SERPL: 11 % — LOW (ref 14–50)
IRON SATN MFR SERPL: 15 UG/DL — LOW (ref 30–160)
LDLC SERPL-MCNC: 58 MG/DL — SIGNIFICANT CHANGE UP
LIPID PNL WITH DIRECT LDL SERPL: 58 MG/DL — SIGNIFICANT CHANGE UP
LYMPHOCYTES # BLD AUTO: 1.31 K/UL — SIGNIFICANT CHANGE UP (ref 1–3.3)
LYMPHOCYTES NFR BLD AUTO: 20.6 % — SIGNIFICANT CHANGE UP (ref 13–44)
MCHC RBC-ENTMCNC: 24.4 PG — LOW (ref 27–34)
MCHC RBC-ENTMCNC: 30.2 G/DL — LOW (ref 32–36)
MCV RBC AUTO: 80.9 FL — SIGNIFICANT CHANGE UP (ref 80–100)
MONOCYTES # BLD AUTO: 0.6 K/UL — SIGNIFICANT CHANGE UP (ref 0–0.9)
MONOCYTES NFR BLD AUTO: 9.4 % — SIGNIFICANT CHANGE UP (ref 2–14)
NEUTROPHILS # BLD AUTO: 4.19 K/UL — SIGNIFICANT CHANGE UP (ref 1.8–7.4)
NEUTROPHILS NFR BLD AUTO: 65.9 % — SIGNIFICANT CHANGE UP (ref 43–77)
NONHDLC SERPL-MCNC: 71 MG/DL — SIGNIFICANT CHANGE UP
NRBC # BLD AUTO: 0 K/UL — SIGNIFICANT CHANGE UP (ref 0–0)
NRBC # FLD: 0 K/UL — SIGNIFICANT CHANGE UP (ref 0–0)
NRBC BLD AUTO-RTO: 0 /100 WBCS — SIGNIFICANT CHANGE UP (ref 0–0)
PLATELET # BLD AUTO: 518 K/UL — HIGH (ref 150–400)
PMV BLD: 8.6 FL — SIGNIFICANT CHANGE UP (ref 7–13)
POTASSIUM SERPL-MCNC: 4 MMOL/L — SIGNIFICANT CHANGE UP (ref 3.5–5.3)
POTASSIUM SERPL-SCNC: 4 MMOL/L — SIGNIFICANT CHANGE UP (ref 3.5–5.3)
PROT SERPL-MCNC: 6.8 GM/DL — SIGNIFICANT CHANGE UP (ref 6–8.3)
PROTHROM AB SERPL-ACNC: 12.7 SEC — SIGNIFICANT CHANGE UP (ref 9.9–13.4)
RBC # BLD: 2.62 M/UL — LOW (ref 3.8–5.2)
RBC # FLD: 15.8 % — HIGH (ref 10.3–14.5)
SODIUM SERPL-SCNC: 132 MMOL/L — LOW (ref 135–145)
TIBC SERPL-MCNC: 138 UG/DL — LOW (ref 220–430)
TRIGL SERPL-MCNC: 55 MG/DL — SIGNIFICANT CHANGE UP
UIBC SERPL-MCNC: 123 UG/DL — SIGNIFICANT CHANGE UP (ref 110–370)
VIT B12 SERPL-MCNC: 992 PG/ML — SIGNIFICANT CHANGE UP (ref 232–1245)
WBC # BLD: 6.36 K/UL — SIGNIFICANT CHANGE UP (ref 3.8–10.5)
WBC # FLD AUTO: 6.36 K/UL — SIGNIFICANT CHANGE UP (ref 3.8–10.5)

## 2025-05-10 PROCEDURE — 99233 SBSQ HOSP IP/OBS HIGH 50: CPT

## 2025-05-10 PROCEDURE — 71250 CT THORAX DX C-: CPT | Mod: 26

## 2025-05-10 PROCEDURE — 74176 CT ABD & PELVIS W/O CONTRAST: CPT | Mod: 26

## 2025-05-10 RX ORDER — SENNA 187 MG
2 TABLET ORAL AT BEDTIME
Refills: 0 | Status: DISCONTINUED | OUTPATIENT
Start: 2025-05-10 | End: 2025-05-14

## 2025-05-10 RX ORDER — POLYETHYLENE GLYCOL 3350 17 G/17G
17 POWDER, FOR SOLUTION ORAL DAILY
Refills: 0 | Status: DISCONTINUED | OUTPATIENT
Start: 2025-05-10 | End: 2025-05-14

## 2025-05-10 RX ORDER — ALPRAZOLAM 0.5 MG
0.5 TABLET, EXTENDED RELEASE 24 HR ORAL ONCE
Refills: 0 | Status: DISCONTINUED | OUTPATIENT
Start: 2025-05-10 | End: 2025-05-10

## 2025-05-10 RX ADMIN — POLYETHYLENE GLYCOL 3350 17 GRAM(S): 17 POWDER, FOR SOLUTION ORAL at 18:18

## 2025-05-10 RX ADMIN — Medication 0.5 MILLIGRAM(S): at 22:04

## 2025-05-10 RX ADMIN — CEFTRIAXONE 1000 MILLIGRAM(S): 500 INJECTION, POWDER, FOR SOLUTION INTRAMUSCULAR; INTRAVENOUS at 18:18

## 2025-05-10 RX ADMIN — Medication 2 TABLET(S): at 22:03

## 2025-05-10 RX ADMIN — CEFTRIAXONE 1000 MILLIGRAM(S): 500 INJECTION, POWDER, FOR SOLUTION INTRAMUSCULAR; INTRAVENOUS at 00:27

## 2025-05-10 NOTE — CONSULT NOTE ADULT - SUBJECTIVE AND OBJECTIVE BOX
Patient is a 89y old  Female who presents with a chief complaint of fatigue (09 May 2025 22:31)    HPI:  88 y/o F w/ PMH of bladder prolapse, urinary retention s/p chronic nuñez, p/w fatigue. Patient states that she has been very fatigued over the last few days. Even walking a short distance she feels very winded. States that has been having decreased appetite over the last month, and has had unintenitonal weight loss of about 20 lbs. She denies any hematochezia / melena / nausea / vomiting / abdominal pain. Patient states that she doesn't have a PCP, and doesn't follow up with any doctors outpatient, except for her urologist. Denies having a colonoscopy in the past. Noted with pyuria, ua positive concern for UTI, started on IV abx.     PSH: THR     Social Hx: Denies tobacco / etoh / drugs     Family Hx:  Denies any family hx    (09 May 2025 22:31)      PMH: as above  PSH: as above  Meds: per reconciliation sheet, noted below  MEDICATIONS  (STANDING):  cefTRIAXone Injectable. 1000 milliGRAM(s) IV Push every 24 hours    MEDICATIONS  (PRN):  melatonin 5 milliGRAM(s) Oral at bedtime PRN Sleep    Allergies    No Known Allergies    Intolerances      Social: no smoking, no alcohol, no illegal drugs; no recent travel, no exposure to TB  FAMILY HISTORY:     no history of premature cardiovascular disease in first degree relatives    ROS: the patient denies fever, no chills, no HA, no dizziness, no sore throat, no blurry vision, no CP, no palpitations, no SOB, no cough, no abdominal pain, no diarrhea, no N/V, no dysuria, no leg pain, no claudication, no rash, no joint aches, no rectal pain or bleeding, no night sweats    All other systems reviewed and are negative    Vital Signs Last 24 Hrs  T(C): 36.7 (10 May 2025 08:17), Max: 37.1 (09 May 2025 15:37)  T(F): 98.1 (10 May 2025 08:17), Max: 98.7 (09 May 2025 15:37)  HR: 79 (10 May 2025 08:17) (74 - 90)  BP: 128/78 (10 May 2025 08:17) (100/68 - 128/78)  BP(mean): 81 (09 May 2025 21:15) (76 - 81)  RR: 17 (10 May 2025 08:17) (17 - 18)  SpO2: 99% (10 May 2025 08:17) (98% - 100%)    Parameters below as of 10 May 2025 08:17  Patient On (Oxygen Delivery Method): room air      Daily     Daily     PE:  Constitutional: NAD  HEENT: NC/AT, EOMI, PERRLA, conjunctivae clear; ears and nose atraumatic; pharynx benign  Neck: supple; thyroid not palpable  Back: no tenderness  Respiratory: respiratory effort normal; clear to auscultation  Cardiovascular: S1S2 regular, no murmurs  Abdomen: soft, not tender, not distended, positive BS; liver and spleen WNL  Genitourinary: no suprapubic tenderness  Lymphatic: no LN palpable  Musculoskeletal: no muscle tenderness, no joint swelling or tenderness  Extremities: no pedal edema  Neurological/ Psychiatric: AxOx3, Judgement and insight normal;  moving all extremities  Skin: no rashes; no palpable lesions    Labs: all available labs reviewed                        6.4    6.36  )-----------( 518      ( 10 May 2025 07:30 )             21.2     05-10    132[L]  |  104  |  23  ----------------------------<  142[H]  4.0   |  22  |  2.29[H]    Ca    8.8      10 May 2025 07:30  Mg     2.3     05-09    TPro  6.8  /  Alb  1.7[L]  /  TBili  0.2  /  DBili  x   /  AST  17  /  ALT  14  /  AlkPhos  99  05-10     LIVER FUNCTIONS - ( 10 May 2025 07:30 )  Alb: 1.7 g/dL / Pro: 6.8 gm/dL / ALK PHOS: 99 U/L / ALT: 14 U/L / AST: 17 U/L / GGT: x           Urinalysis Basic - ( 10 May 2025 07:30 )    Color: x / Appearance: x / SG: x / pH: x  Gluc: 142 mg/dL / Ketone: x  / Bili: x / Urobili: x   Blood: x / Protein: x / Nitrite: x   Leuk Esterase: x / RBC: x / WBC x   Sq Epi: x / Non Sq Epi: x / Bacteria: x          Radiology: all available radiological tests reviewed  < from: CT Head No Cont (05.09.25 @ 16:51) >    ACC: [id number] EXAM:  CT BRAIN   ORDERED BY: MARISA PARRA     PROCEDURE DATE:  05/09/2025          INTERPRETATION:  CLINICAL INFORMATION: Fatigue.    COMPARISON: No similar prior studies for comparison.    CONTRAST:  IV Contrast: NONE    TECHNIQUE:  Serial axial images were obtained from the skull base to the   vertex using multi-slice helical technique. Sagittal and coronal   reformats were obtained.    FINDINGS:    VENTRICLES AND SULCI: Age-appropriate atrophy.  INTRA-AXIAL: No evidence of mass effect, acute hemorrhage, or midline   shift.  Compelling evidence of acute territorial infarct. Moderate white   matter hypodensities; a nonspecific finding which statistically reflects   chronic microvascular ischemic change.  EXTRA-AXIAL: No mass or fluid collection. Basal cisterns are normal in   appearance.    VISUALIZED SINUSES:  Partial opacification visualized left maxillary   sinus, with internal hyperdensity-calcification as well as wall   thickening. No air-fluid levels.  TYMPANOMASTOID CAVITIES:  No effusion.  VISUALIZED ORBITS: Grossly unremarkable.  CALVARIUM: Intact.    MISCELLANEOUS: None.    IMPRESSION:  1. No evidence of acute hemorrhage, territorial infarct or mass effect.   Senescent changes as above.  2. Evidence of chronic left maxillary sinusitis with internal   hyperdensity-calcification; the latter of which can be seen in the   setting of inspissated secretions as well as fungal colonization.   Correlate clinically.  3. If clinical symptoms persist consider further imaging with MRI,   provided there are no medical contraindications.        ACC: [id number] EXAM:  XR CHEST PORTABLE IMMED 1V   ORDERED BY: MARISA PARRA     PROCEDURE DATE:  05/09/2025          INTERPRETATION:  TECHNIQUE: A single AP view of the chest was obtained.   Ordered time:   5/9/2025 4:27 PM    COMPARISON: 3/6/2020    CLINICAL INFORMATION: Weakness and fatigue    FINDINGS:    The heart is not well assessed on an AP film.  The lungs are clear.  There are no pleural effusions.  There is no pneumothorax.    IMPRESSION:    No radiographic evidence of acute cardiopulmonary disease      Advanced directives addressed: full resuscitation

## 2025-05-10 NOTE — CONSULT NOTE ADULT - ASSESSMENT
88 y/o F w/ PMH of bladder prolapse, urinary retention s/p chronic nuñez, p/w fatigue. Patient states that she has been very fatigued over the last few days. Even walking a short distance she feels very winded. States that has been having decreased appetite over the last month, and has had unintentional weight loss of about 20 lbs. She denies any hematochezia / melena / nausea / vomiting / abdominal pain. Patient states that she doesn't have a PCP, and doesn't follow up with any doctors outpatient, except for her urologist. Denies having a colonoscopy in the past. Noted with pyuria, ua positive concern for UTI, started on IV abx.     Pyuria. UTI. Urinary retention with chronic nuñez. Bladder prolapse. 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 Prior cultures reviewed. An epidemiologic assessment was performed. There is a significant risk for resistant microorganisms to spread to family members, and/or healthcare staff. The patient will be placed on isolation according to infection control policy. Will reconsider isolation measures based on new culture results and other clinical data as appropriate. Appropriate cultures collected and an appropriate broad spectrum antibiotic therapy will be considered.      Clinical team may change from intravenous to oral antibiotics when the following criteria are met:   1. Patient is clinically improving/stable       a)	Improved signs and symptoms of infection from initial presentation       b)	Afebrile for 24 hours       c)	Leukocytosis trending towards normal range   2. Patient is tolerating oral intake   3. Initial/repeat blood cultures are negative     Cannot advise changing to oral antibiotic therapy until culture sensitivity is available.  - if pt sent home before cx back can do po ceftin 250mg BID x 7 day course and f/u final cx and adjust

## 2025-05-11 LAB
ANION GAP SERPL CALC-SCNC: 6 MMOL/L — SIGNIFICANT CHANGE UP (ref 5–17)
BUN SERPL-MCNC: 25 MG/DL — HIGH (ref 7–23)
CALCIUM SERPL-MCNC: 9.3 MG/DL — SIGNIFICANT CHANGE UP (ref 8.5–10.1)
CHLORIDE SERPL-SCNC: 107 MMOL/L — SIGNIFICANT CHANGE UP (ref 96–108)
CO2 SERPL-SCNC: 22 MMOL/L — SIGNIFICANT CHANGE UP (ref 22–31)
CREAT SERPL-MCNC: 2.29 MG/DL — HIGH (ref 0.5–1.3)
CULTURE RESULTS: SIGNIFICANT CHANGE UP
EGFR: 20 ML/MIN/1.73M2 — LOW
EGFR: 20 ML/MIN/1.73M2 — LOW
GLUCOSE SERPL-MCNC: 94 MG/DL — SIGNIFICANT CHANGE UP (ref 70–99)
HCT VFR BLD CALC: 25.8 % — LOW (ref 34.5–45)
HGB BLD-MCNC: 7.9 G/DL — LOW (ref 11.5–15.5)
MCHC RBC-ENTMCNC: 25.2 PG — LOW (ref 27–34)
MCHC RBC-ENTMCNC: 30.6 G/DL — LOW (ref 32–36)
MCV RBC AUTO: 82.2 FL — SIGNIFICANT CHANGE UP (ref 80–100)
NRBC # BLD AUTO: 0 K/UL — SIGNIFICANT CHANGE UP (ref 0–0)
NRBC # FLD: 0 K/UL — SIGNIFICANT CHANGE UP (ref 0–0)
NRBC BLD AUTO-RTO: 0 /100 WBCS — SIGNIFICANT CHANGE UP (ref 0–0)
PLATELET # BLD AUTO: 532 K/UL — HIGH (ref 150–400)
PMV BLD: 8.5 FL — SIGNIFICANT CHANGE UP (ref 7–13)
POTASSIUM SERPL-MCNC: 4.2 MMOL/L — SIGNIFICANT CHANGE UP (ref 3.5–5.3)
POTASSIUM SERPL-SCNC: 4.2 MMOL/L — SIGNIFICANT CHANGE UP (ref 3.5–5.3)
RBC # BLD: 3.14 M/UL — LOW (ref 3.8–5.2)
RBC # FLD: 15.8 % — HIGH (ref 10.3–14.5)
SODIUM SERPL-SCNC: 135 MMOL/L — SIGNIFICANT CHANGE UP (ref 135–145)
SPECIMEN SOURCE: SIGNIFICANT CHANGE UP
WBC # BLD: 6.52 K/UL — SIGNIFICANT CHANGE UP (ref 3.8–10.5)
WBC # FLD AUTO: 6.52 K/UL — SIGNIFICANT CHANGE UP (ref 3.8–10.5)

## 2025-05-11 PROCEDURE — 99231 SBSQ HOSP IP/OBS SF/LOW 25: CPT

## 2025-05-11 PROCEDURE — 73501 X-RAY EXAM HIP UNI 1 VIEW: CPT | Mod: 26,RT

## 2025-05-11 PROCEDURE — 99233 SBSQ HOSP IP/OBS HIGH 50: CPT

## 2025-05-11 RX ORDER — IRON SUCROSE 20 MG/ML
100 INJECTION, SOLUTION INTRAVENOUS EVERY 24 HOURS
Refills: 0 | Status: COMPLETED | OUTPATIENT
Start: 2025-05-11 | End: 2025-05-14

## 2025-05-11 RX ORDER — ALPRAZOLAM 0.5 MG
0.25 TABLET, EXTENDED RELEASE 24 HR ORAL ONCE
Refills: 0 | Status: DISCONTINUED | OUTPATIENT
Start: 2025-05-11 | End: 2025-05-12

## 2025-05-11 RX ADMIN — POLYETHYLENE GLYCOL 3350 17 GRAM(S): 17 POWDER, FOR SOLUTION ORAL at 10:33

## 2025-05-11 RX ADMIN — CEFTRIAXONE 1000 MILLIGRAM(S): 500 INJECTION, POWDER, FOR SOLUTION INTRAMUSCULAR; INTRAVENOUS at 18:32

## 2025-05-11 RX ADMIN — Medication 2 TABLET(S): at 21:03

## 2025-05-11 NOTE — CONSULT NOTE ADULT - ASSESSMENT
88 Y/O F with Pyuria. UTI. Urinary retention with chronic nuñez. Bladder prolapse. FRANCIS, Severe left hydroureteronephrosis with mixed attenuation the dilated left renal collecting system which may reflect underlying clot or mass. Retroperitoneal adenopathy.   -  MR urogram for further assessment  - Possible Nephrostomy tube after MRI    - Above plan D/W Dr. Seals

## 2025-05-11 NOTE — PROGRESS NOTE ADULT - ASSESSMENT
----- Message from Natalya Rivas sent at 11/4/2021 12:36 PM EDT -----  Regarding: Down syndrome test   Hey ! Is there anyway I can get an order put in to come in and give blood for the Down syndrome test , we’re willing to pay out of pocket because of our family genetics    89 y/o F w/ PMH of bladder prolapse, urinary retention s/p chronic nuñez, p/w fatigue. Patient states that she has been very fatigued over the last few days. Even walking a short distance she feels very winded. States that has been having decreased appetite over the last month, and has had unintentional weight loss of about 20 lbs. She denies any hematochezia / melena / nausea / vomiting / abdominal pain. Patient states that she doesn't have a PCP, and doesn't follow up with any doctors outpatient, except for her urologist. Denies having a colonoscopy in the past. Noted with pyuria, ua positive concern for UTI, started on IV abx.     Pyuria. UTI. Urinary retention with chronic nuñez. Bladder prolapse. 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 Prior cultures reviewed. An epidemiologic assessment was performed. There is a significant risk for resistant microorganisms to spread to family members, and/or healthcare staff. The patient will be placed on isolation according to infection control policy. Will reconsider isolation measures based on new culture results and other clinical data as appropriate. Appropriate cultures collected and an appropriate broad spectrum antibiotic therapy will be considered.      Clinical team may change from intravenous to oral antibiotics when the following criteria are met:   1. Patient is clinically improving/stable       a)	Improved signs and symptoms of infection from initial presentation       b)	Afebrile for 24 hours       c)	Leukocytosis trending towards normal range   2. Patient is tolerating oral intake   3. Initial/repeat blood cultures are negative     - if pt sent home before cx back can do po ceftin 250mg BID x 7 day course

## 2025-05-11 NOTE — CONSULT NOTE ADULT - SUBJECTIVE AND OBJECTIVE BOX
Patient is a 89y old  Female who presents with a chief complaint of fatigue (10 May 2025 21:32)      HPI:  89 y/o F w/ PMH of bladder prolapse, urinary retention s/p chronic nuñez, p/w fatigue. Patient states that she has been very fatigued over the last few days. Even walking a short distance she feels very winded. States that has been having decreased appetite over the last month, and has had unintenitonal weight loss of about 20 lbs. She denies any hematochezia / melena / nausea / vomiting / abdominal pain. Patient states that she doesn't have a PCP, and doesn't follow up with any doctors outpatient, except for her urologist. Denies having a colonoscopy in the past.     Urology consulted for left hydronephrosis.    PSH: THR     Social Hx: Denies tobacco / etoh / drugs     Family Hx:  Denies any family hx    (09 May 2025 22:31)      PAST MEDICAL & SURGICAL HISTORY:  No pertinent past medical history      No significant past surgical history          REVIEW OF SYSTEMS:    CONSTITUTIONAL:  fevers or chills  HEENT: No visual changes  ENDO: No sweating  NECK: No pain or stiffness  MUSCULOSKELETAL: No back pain, no joint pain  RESPIRATORY: No shortness of breath  CARDIOVASCULAR: No chest pain  GASTROINTESTINAL: No abdominal or epigastric pain. No nausea, vomiting,  No diarrhea or constipation.   NEUROLOGICAL: No mental status changes  PSYCH: No depression, no mood changes  SKIN: No itching      MEDICATIONS  (STANDING):  cefTRIAXone Injectable. 1000 milliGRAM(s) IV Push every 24 hours  pantoprazole    Tablet 40 milliGRAM(s) Oral before breakfast  polyethylene glycol 3350 17 Gram(s) Oral daily  senna 2 Tablet(s) Oral at bedtime    MEDICATIONS  (PRN):  melatonin 5 milliGRAM(s) Oral at bedtime PRN Sleep      Allergies    No Known Allergies    Intolerances        SOCIAL HISTORY: No illicit drug use    FAMILY HISTORY:      Vital Signs Last 24 Hrs  T(C): 36.5 (11 May 2025 08:34), Max: 37 (10 May 2025 16:50)  T(F): 97.7 (11 May 2025 08:34), Max: 98.6 (10 May 2025 16:50)  HR: 98 (11 May 2025 08:34) (72 - 98)  BP: 119/81 (11 May 2025 08:34) (92/48 - 119/81)  BP(mean): 78 (10 May 2025 22:50) (78 - 78)  RR: 16 (11 May 2025 08:34) (16 - 18)  SpO2: 94% (11 May 2025 08:34) (94% - 98%)    Parameters below as of 11 May 2025 08:34  Patient On (Oxygen Delivery Method): room air        PHYSICAL EXAM:    Constitutional: NAD, well-developed  HEENT: EOMI  Neck: no pain  Back: No CVA tenderness  Respiratory: No accessory respiratory muscle use  Abd: Soft, NT/ND  no organomegally  no hernia  : No CVAT  Extremities: no edema  Neurological: A/O x 3  Psychiatric: Normal mood, normal affect  Skin: No rashes    I&O's Summary    10 May 2025 07:01  -  11 May 2025 07:00  --------------------------------------------------------  IN: 325 mL / OUT: 2400 mL / NET: -2075 mL        LABS:                        7.9    6.52  )-----------( 532      ( 11 May 2025 07:50 )             25.8     05-11    135  |  107  |  25[H]  ----------------------------<  94  4.2   |  22  |  2.29[H]    Ca    9.3      11 May 2025 07:50  Mg     2.3     05-09    TPro  6.8  /  Alb  1.7[L]  /  TBili  0.2  /  DBili  x   /  AST  17  /  ALT  14  /  AlkPhos  99  05-10    PT/INR - ( 10 May 2025 07:30 )   PT: 12.7 sec;   INR: 1.08 ratio         PTT - ( 10 May 2025 07:30 )  PTT:25.5 sec  Urinalysis Basic - ( 11 May 2025 07:50 )    Color: x / Appearance: x / SG: x / pH: x  Gluc: 94 mg/dL / Ketone: x  / Bili: x / Urobili: x   Blood: x / Protein: x / Nitrite: x   Leuk Esterase: x / RBC: x / WBC x   Sq Epi: x / Non Sq Epi: x / Bacteria: x      Urine Culture:         RADIOLOGY & ADDITIONAL STUDIES:

## 2025-05-11 NOTE — CHART NOTE - NSCHARTNOTEFT_GEN_A_CORE
89 y/o F w/ PMH of bladder prolapse, urinary retention s/p chronic nuñez, p/w fatigue. Patient states that she has been very fatigued over the last few days. Even walking a short distance she feels very winded. States that has been having decreased appetite over the last month, and has had unintenitonal weight loss of about 20 lbs. She denies any hematochezia / melena / nausea / vomiting / abdominal pain. Patient states that she doesn't have a PCP, and doesn't follow up with any doctors outpatient, except for her urologist. Denies having a colonoscopy in the past.     Asked by Nursing administration to evaluate pt who had a fall from her bed last night and did not want it reported  Stated she was getting up to move the blankets at the bottom of her bed; nuñez attached to gravity bag.  Said slipped and fell onto R hip.  Reported assistance getting up  Denied injury  No c/o pain; nuñez not dislodged, no blood      MEDICATIONS  (STANDING):  iron sucrose IVPB 100 milliGRAM(s) IV Intermittent every 24 hours  pantoprazole    Tablet 40 milliGRAM(s) Oral before breakfast  polyethylene glycol 3350 17 Gram(s) Oral daily  senna 2 Tablet(s) Oral at bedtime    MEDICATIONS  (PRN):  melatonin 5 milliGRAM(s) Oral at bedtime PRN Sleep        Vital Signs Last 24 Hrs  T(C): 37.2 (11 May 2025 16:33), Max: 37.2 (11 May 2025 16:33)  T(F): 98.9 (11 May 2025 16:33), Max: 98.9 (11 May 2025 16:33)  HR: 64 (11 May 2025 16:33) (64 - 98)  BP: 102/72 (11 May 2025 16:33) (102/72 - 119/81)  BP(mean): 78 (10 May 2025 22:50) (78 - 78)  RR: 17 (11 May 2025 16:33) (16 - 18)  SpO2: 96% (11 May 2025 16:33) (94% - 97%)    Parameters below as of 11 May 2025 16:33  Patient On (Oxygen Delivery Method): room air    GEN appears in NAD  HEENT no asymmetry, pupils reactive, EOMI conj clear  Neck nontender to palpation  RESP clear BS, unlabored respirations  COR RR S1 + S2, no edema  NEURO awake and alert and oriented x 3  MSK all compartments soft and compressible  pelvic no pain to A-P and latera compression  no TTP T-L-S spine  DERM no bruising noted  PSYCH nl affect         A/p     #Reported fall  no injury detected    1. R hip xray ordered by nursing  2. will check once done  3. pt advised to ring for assistance  4. PT consult      #30 minutes

## 2025-05-11 NOTE — CONSULT NOTE ADULT - SUBJECTIVE AND OBJECTIVE BOX
89 y/o F w/ PMH of bladder prolapse, urinary retention s/p chronic nuñez, p/w fatigue. Patient states that she has been very fatigued over the last few days. Even walking a short distance she feels very winded. States that has been having decreased appetite over the last month, and has had unintenitonal weight loss of about 20 lbs. She denies any hematochezia / melena / nausea / vomiting / abdominal pain. Patient states that she doesn't have a PCP, and doesn't follow up with any doctors outpatient, except for her urologist Dr Maxwell for Nuñez exchanges. Denies having a colonoscopy in the past. Pt has not had labs checked in years as she does not see a MD    Today    pt alert and awake    denies complaints   pt recognized me as I took care of her  Pablo Lucia   denies NSAID use    last renal imaging in 2020      Cr 2.2 - 2.3 in 2020       Home Medications:      MEDICATIONS  (STANDING):  cefTRIAXone Injectable. 1000 milliGRAM(s) IV Push every 24 hours  pantoprazole    Tablet 40 milliGRAM(s) Oral before breakfast  polyethylene glycol 3350 17 Gram(s) Oral daily  senna 2 Tablet(s) Oral at bedtime      Allergies    No Known Allergies    Intolerances      SOCIAL HISTORY:  Denies ETOh,Smoking,       VITAL:  T(C): 37.2 (05-11-25), Max: 37.2 (05-11-25)  T(F): 98.9 (05-11-25), Max: 98.9 (05-11-25)  HR: 64 (05-11-25) (64 - 98)  BP: 102/72 (05-11-25) (102/72 - 119/81)  RR: 17 (05-11-25)  SpO2: 96% (05-11-25) (94% - 97%)      I&O's Detail    10 May 2025 07:01  -  11 May 2025 07:00  --------------------------------------------------------  IN:    PRBCs (Packed Red Blood Cells): 325 mL  Total IN: 325 mL    OUT:    Voided (mL): 2400 mL  Total OUT: 2400 mL    Total NET: -2075 mL      PHYSICAL EXAM:    Constitutional: NAD  HEENT:   MM  Neck:   Respiratory: CTAB  Cardiovascular: S1 and S2  Gastrointestinal: BS+, soft, NT/ND  Extremities: No peripheral edema  Neurological: A/O x 3  : ++ Nuñez  Access: Not applicable    LABS:                          7.9    6.52  )-----------( 532      ( 11 May 2025 07:50 )             25.8                         6.4    6.36  )-----------( 518      ( 10 May 2025 07:30 )             21.2       135    |  107    |  25     ----------------------------<  94        11 May 2025 07:50  4.2     |  22     |  2.29     132    |  104    |  23     ----------------------------<  142       10 May 2025 07:30  4.0     |  22     |  2.29     132    |  102    |  29     ----------------------------<  105       09 May 2025 17:12  4.2     |  22     |  2.55     Ca    9.3        11 May 2025 07:50  Ca    8.8        10 May 2025 07:30      Mg     2.3       09 May 2025 17:12    TPro  6.8    /  Alb  1.7    /  TBili  0.2    /        10 May 2025 07:30  DBili  x      /  AST  17     /  ALT  14     /  AlkPhos  99       TPro  8.1    /  Alb  2.0    /  TBili  0.3    /        09 May 2025 17:12  DBili  x      /  AST  22     /  ALT  20     /  AlkPhos  116            Urine Studies:  Urinalysis Basic - ( 11 May 2025 07:50 )    Color: x / Appearance: x / SG: x / pH: x  Gluc: 94 mg/dL / Ketone: x  / Bili: x / Urobili: x   Blood: x / Protein: x / Nitrite: x   Leuk Esterase: x / RBC: x / WBC x   Sq Epi: x / Non Sq Epi: x / Bacteria: x            RADIOLOGY & ADDITIONAL STUDIES:    ACC: [id number] EXAM:  CT CHEST   ORDERED BY: ANEUDY MOHAMUD     ACC: [id number] EXAM:  CT ABDOMEN AND PELVIS   ORDERED BY: ANEUDYBIRANNA MOHAMUD     PROCEDURE DATE:  05/10/2025          INTERPRETATION:  CLINICAL INFORMATION: Unintentional weight loss loss.    COMPARISON: CT chest abdomen pelvis 3/6/2020    CONTRAST/COMPLICATIONS:  IV Contrast: None  Oral Contrast: None    PROCEDURE:  CT of the Chest, Abdomen and Pelvis was performed.  Sagittal and coronal reformats were performed.    FINDINGS:  CHEST:  LUNGS AND LARGE AIRWAYS: Patent central airways. Few scattered calcified   and noncalcified sub-5 mm pulmonary nodules. Small volume linear and   passive atelectasis bilateral lower lobes.  PLEURA: Trace bilateral pleural effusion.  VESSELS: Aortic calcifications. Coronary artery calcifications.  HEART: Heart size is normal. No pericardial effusion.  MEDIASTINUM AND BREONNA: No lymphadenopathy.  CHEST WALL AND LOWER NECK: Within normal limits.    ABDOMEN AND PELVIS:  LIVER: Fluid attenuation lesion in the right hepatic lobe measuring 0.8 x   1.0 cm, most consistent with benign etiology such as cyst or hemangioma.  BILE DUCTS: Normal caliber.  GALLBLADDER: Prominently distended. Cholelithiasis.  SPLEEN: Within normal limits.  PANCREAS: Within normal limits.  ADRENALS: Within normal limits.  KIDNEYS/URETERS: Right renal atrophy with nondilated collecting system.    Severe left hydroureteronephrosis extending to the level the bladder with   new urothelial thickening and mixed attenuation in the renal collecting   system and overlying cortical thinning. Asymmetric left perinephric   stranding. Redemonstrated exophytic fluid attenuation cyst left renal   cyst.    BLADDER: Collapsed around a Nuñez catheter.  REPRODUCTIVE ORGANS: Atrophic uterus. No adnexal mass.    BOWEL: No bowel obstruction. Appendix is not discretely visualized.   Colonic diverticulosis without evidence of diverticulitis. Nonobstructive   small bowel within a left inguinal hernia.  PERITONEUM/RETROPERITONEUM: Within normal limits.  VESSELS: Ectatic abdominal aorta with diffuse calcified plaques. Diffuse   atherosclerotic calcifications.  LYMPH NODES: Multiple enlarged lymph nodes along the left ureter and   periaortic/perirenal chain measuring up to 9 mm in short axis  ABDOMINAL WALL: Left inguinal hernia containing fat and nonobstructed   small bowel, as per above..  BONES: Status post right hip arthroplasty. Degenerative changes spine.    IMPRESSION:  Severe left hydroureteronephrosis with mixed attenuation the dilated left   renal collecting system which may reflect underlying clot or mass.    Retroperitoneal adenopathy.    Recommend follow-up CT versus MR urogram for further assessment.    Cholelithiasis with markedly distended gallbladder similar to prior.   Correlate with LFTs.          2020 CT Abdomen     KIDNEYS/URETERS: Moderately atrophic and scarred. Marked bilateral hydroureteronephrosis which can be traced to the level of the ureterovesical junction. No obstructing stone or mass identified.    BLADDER: Within normal limits.               89 y/o F w/ PMH of bladder prolapse, urinary retention s/p chronic nuñez, p/w fatigue. Patient states that she has been very fatigued over the last few days. Even walking a short distance she feels very winded. States that has been having decreased appetite over the last month, and has had unintenitonal weight loss of about 20 lbs. She denies any hematochezia / melena / nausea / vomiting / abdominal pain. Patient states that she doesn't have a PCP, and doesn't follow up with any doctors outpatient, except for her urologist Dr Maxwell for Nuñez exchanges. Denies having a colonoscopy in the past. Pt has not had labs checked in years as she does not see a MD  20 pound weight loss past 1 month     Today    pt alert and awake    denies complaints   pt recognized me as I took care of her  Pablo Lucia   denies NSAID use    last renal imaging in 2020      Cr 2.2 - 2.3 in 2020       Home Medications:      MEDICATIONS  (STANDING):  cefTRIAXone Injectable. 1000 milliGRAM(s) IV Push every 24 hours  pantoprazole    Tablet 40 milliGRAM(s) Oral before breakfast  polyethylene glycol 3350 17 Gram(s) Oral daily  senna 2 Tablet(s) Oral at bedtime      Allergies    No Known Allergies    Intolerances      SOCIAL HISTORY:  Denies ETOh,Smoking,       VITAL:  T(C): 37.2 (05-11-25), Max: 37.2 (05-11-25)  T(F): 98.9 (05-11-25), Max: 98.9 (05-11-25)  HR: 64 (05-11-25) (64 - 98)  BP: 102/72 (05-11-25) (102/72 - 119/81)  RR: 17 (05-11-25)  SpO2: 96% (05-11-25) (94% - 97%)      I&O's Detail    10 May 2025 07:01  -  11 May 2025 07:00  --------------------------------------------------------  IN:    PRBCs (Packed Red Blood Cells): 325 mL  Total IN: 325 mL    OUT:    Voided (mL): 2400 mL  Total OUT: 2400 mL    Total NET: -2075 mL      PHYSICAL EXAM:    Constitutional: NAD  HEENT:   MM  Neck:   Respiratory: CTAB  Cardiovascular: S1 and S2  Gastrointestinal: BS+, soft, NT/ND  Extremities: No peripheral edema  Neurological: A/O x 3  : ++ Nuñez  Access: Not applicable    LABS:                          7.9    6.52  )-----------( 532      ( 11 May 2025 07:50 )             25.8                         6.4    6.36  )-----------( 518      ( 10 May 2025 07:30 )             21.2       135    |  107    |  25     ----------------------------<  94        11 May 2025 07:50  4.2     |  22     |  2.29     132    |  104    |  23     ----------------------------<  142       10 May 2025 07:30  4.0     |  22     |  2.29     132    |  102    |  29     ----------------------------<  105       09 May 2025 17:12  4.2     |  22     |  2.55     Ca    9.3        11 May 2025 07:50  Ca    8.8        10 May 2025 07:30      Mg     2.3       09 May 2025 17:12    TPro  6.8    /  Alb  1.7    /  TBili  0.2    /        10 May 2025 07:30  DBili  x      /  AST  17     /  ALT  14     /  AlkPhos  99       TPro  8.1    /  Alb  2.0    /  TBili  0.3    /        09 May 2025 17:12  DBili  x      /  AST  22     /  ALT  20     /  AlkPhos  116            Urine Studies:  Urinalysis Basic - ( 11 May 2025 07:50 )    Color: x / Appearance: x / SG: x / pH: x  Gluc: 94 mg/dL / Ketone: x  / Bili: x / Urobili: x   Blood: x / Protein: x / Nitrite: x   Leuk Esterase: x / RBC: x / WBC x   Sq Epi: x / Non Sq Epi: x / Bacteria: x            RADIOLOGY & ADDITIONAL STUDIES:    ACC: [id number] EXAM:  CT CHEST   ORDERED BY: ANEUDY MOHAMUD     ACC: [id number] EXAM:  CT ABDOMEN AND PELVIS   ORDERED BY: ANEUDY MOHAMUD     PROCEDURE DATE:  05/10/2025          INTERPRETATION:  CLINICAL INFORMATION: Unintentional weight loss loss.    COMPARISON: CT chest abdomen pelvis 3/6/2020    CONTRAST/COMPLICATIONS:  IV Contrast: None  Oral Contrast: None    PROCEDURE:  CT of the Chest, Abdomen and Pelvis was performed.  Sagittal and coronal reformats were performed.    FINDINGS:  CHEST:  LUNGS AND LARGE AIRWAYS: Patent central airways. Few scattered calcified   and noncalcified sub-5 mm pulmonary nodules. Small volume linear and   passive atelectasis bilateral lower lobes.  PLEURA: Trace bilateral pleural effusion.  VESSELS: Aortic calcifications. Coronary artery calcifications.  HEART: Heart size is normal. No pericardial effusion.  MEDIASTINUM AND BREONNA: No lymphadenopathy.  CHEST WALL AND LOWER NECK: Within normal limits.    ABDOMEN AND PELVIS:  LIVER: Fluid attenuation lesion in the right hepatic lobe measuring 0.8 x   1.0 cm, most consistent with benign etiology such as cyst or hemangioma.  BILE DUCTS: Normal caliber.  GALLBLADDER: Prominently distended. Cholelithiasis.  SPLEEN: Within normal limits.  PANCREAS: Within normal limits.  ADRENALS: Within normal limits.  KIDNEYS/URETERS: Right renal atrophy with nondilated collecting system.    Severe left hydroureteronephrosis extending to the level the bladder with   new urothelial thickening and mixed attenuation in the renal collecting   system and overlying cortical thinning. Asymmetric left perinephric   stranding. Redemonstrated exophytic fluid attenuation cyst left renal   cyst.    BLADDER: Collapsed around a Nuñez catheter.  REPRODUCTIVE ORGANS: Atrophic uterus. No adnexal mass.    BOWEL: No bowel obstruction. Appendix is not discretely visualized.   Colonic diverticulosis without evidence of diverticulitis. Nonobstructive   small bowel within a left inguinal hernia.  PERITONEUM/RETROPERITONEUM: Within normal limits.  VESSELS: Ectatic abdominal aorta with diffuse calcified plaques. Diffuse   atherosclerotic calcifications.  LYMPH NODES: Multiple enlarged lymph nodes along the left ureter and   periaortic/perirenal chain measuring up to 9 mm in short axis  ABDOMINAL WALL: Left inguinal hernia containing fat and nonobstructed   small bowel, as per above..  BONES: Status post right hip arthroplasty. Degenerative changes spine.    IMPRESSION:  Severe left hydroureteronephrosis with mixed attenuation the dilated left   renal collecting system which may reflect underlying clot or mass.    Retroperitoneal adenopathy.    Recommend follow-up CT versus MR urogram for further assessment.    Cholelithiasis with markedly distended gallbladder similar to prior.   Correlate with LFTs.          2020 CT Abdomen     KIDNEYS/URETERS: Moderately atrophic and scarred. Marked bilateral hydroureteronephrosis which can be traced to the level of the ureterovesical junction. No obstructing stone or mass identified.    BLADDER: Within normal limits.

## 2025-05-11 NOTE — CONSULT NOTE ADULT - ASSESSMENT
88 y/o F w/ PMH of bladder prolapse, urinary retention s/p chronic nuñez, p/w fatigue. Patient states that she has been very fatigued over the last few days. Even walking a short distance she feels very winded. States that has been having decreased appetite over the last month, and has had unintenitonal weight loss of about 20 lbs. She denies any hematochezia / melena / nausea / vomiting / abdominal pain. Patient states that she doesn't have a PCP, and doesn't follow up with any doctors outpatient, except for her urologist Dr Martinez for Nuñez exchanges. Denies having a colonoscopy in the past. 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 We will follow this patient with you.   Management is subject to change if new information becomes available or patient condition changes.             89 y/o F w/ PMH of bladder prolapse, urinary retention s/p chronic nuñez, p/w fatigue. Patient states that she has been very fatigued over the last few days. Even walking a short distance she feels very winded. States that has been having decreased appetite over the last month, and has had unintenitonal weight loss of about 20 lbs. She denies any hematochezia / melena / nausea / vomiting / abdominal pain. Patient states that she doesn't have a PCP, and doesn't follow up with any doctors outpatient, except for her urologist Dr Martinez for Nuñez exchanges. Denies having a colonoscopy in the past. 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 bx   Left PCN ? - as per Urology Dr Martinez    Thank you for the courtesy of this consult. We will follow this patient with you.   Management is subject to change if new information becomes available or patient condition changes.             88 y/o F w/ PMH of bladder prolapse, urinary retention s/p chronic nuñez, p/w fatigue. Patient states that she has been very fatigued over the last few days. Even walking a short distance she feels very winded. States that has been having decreased appetite over the last month, and has had unintenitonal weight loss of about 20 lbs. She denies any hematochezia / melena / nausea / vomiting / abdominal pain. Patient states that she doesn't have a PCP, and doesn't follow up with any doctors outpatient, except for her urologist Dr Martinez for Nuñez exchanges. Denies having a colonoscopy in the past. 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 bx    renal scan    Left PCN ? - as per Urology Dr Martinez    Thank you for the courtesy of this consult. We will follow this patient with you.   Management is subject to change if new information becomes available or patient condition changes.

## 2025-05-12 LAB
ANION GAP SERPL CALC-SCNC: 5 MMOL/L — SIGNIFICANT CHANGE UP (ref 5–17)
BUN SERPL-MCNC: 23 MG/DL — SIGNIFICANT CHANGE UP (ref 7–23)
CALCIUM SERPL-MCNC: 9.2 MG/DL — SIGNIFICANT CHANGE UP (ref 8.5–10.1)
CHLORIDE SERPL-SCNC: 107 MMOL/L — SIGNIFICANT CHANGE UP (ref 96–108)
CO2 SERPL-SCNC: 24 MMOL/L — SIGNIFICANT CHANGE UP (ref 22–31)
CREAT SERPL-MCNC: 2.34 MG/DL — HIGH (ref 0.5–1.3)
EGFR: 20 ML/MIN/1.73M2 — LOW
EGFR: 20 ML/MIN/1.73M2 — LOW
GLUCOSE SERPL-MCNC: 111 MG/DL — HIGH (ref 70–99)
HCT VFR BLD CALC: 27.7 % — LOW (ref 34.5–45)
HGB BLD-MCNC: 8.4 G/DL — LOW (ref 11.5–15.5)
MCHC RBC-ENTMCNC: 25.2 PG — LOW (ref 27–34)
MCHC RBC-ENTMCNC: 30.3 G/DL — LOW (ref 32–36)
MCV RBC AUTO: 83.2 FL — SIGNIFICANT CHANGE UP (ref 80–100)
NRBC # BLD AUTO: 0 K/UL — SIGNIFICANT CHANGE UP (ref 0–0)
NRBC # FLD: 0 K/UL — SIGNIFICANT CHANGE UP (ref 0–0)
NRBC BLD AUTO-RTO: 0 /100 WBCS — SIGNIFICANT CHANGE UP (ref 0–0)
PLATELET # BLD AUTO: 584 K/UL — HIGH (ref 150–400)
PMV BLD: 8.3 FL — SIGNIFICANT CHANGE UP (ref 7–13)
POTASSIUM SERPL-MCNC: 4.6 MMOL/L — SIGNIFICANT CHANGE UP (ref 3.5–5.3)
POTASSIUM SERPL-SCNC: 4.6 MMOL/L — SIGNIFICANT CHANGE UP (ref 3.5–5.3)
RBC # BLD: 3.33 M/UL — LOW (ref 3.8–5.2)
RBC # FLD: 15.9 % — HIGH (ref 10.3–14.5)
SODIUM SERPL-SCNC: 136 MMOL/L — SIGNIFICANT CHANGE UP (ref 135–145)
WBC # BLD: 6.03 K/UL — SIGNIFICANT CHANGE UP (ref 3.8–10.5)
WBC # FLD AUTO: 6.03 K/UL — SIGNIFICANT CHANGE UP (ref 3.8–10.5)

## 2025-05-12 PROCEDURE — 99233 SBSQ HOSP IP/OBS HIGH 50: CPT

## 2025-05-12 PROCEDURE — 99222 1ST HOSP IP/OBS MODERATE 55: CPT

## 2025-05-12 RX ORDER — DIAZEPAM 2 MG/1
5 TABLET ORAL ONCE
Refills: 0 | Status: DISCONTINUED | OUTPATIENT
Start: 2025-05-12 | End: 2025-05-13

## 2025-05-12 RX ORDER — CEFTRIAXONE 500 MG/1
1000 INJECTION, POWDER, FOR SOLUTION INTRAMUSCULAR; INTRAVENOUS EVERY 24 HOURS
Refills: 0 | Status: DISCONTINUED | OUTPATIENT
Start: 2025-05-12 | End: 2025-05-12

## 2025-05-12 RX ORDER — CEFUROXIME SODIUM 1.5 G
250 VIAL (EA) INJECTION EVERY 12 HOURS
Refills: 0 | Status: DISCONTINUED | OUTPATIENT
Start: 2025-05-12 | End: 2025-05-14

## 2025-05-12 RX ADMIN — Medication 0.25 MILLIGRAM(S): at 21:07

## 2025-05-12 RX ADMIN — Medication 40 MILLIGRAM(S): at 05:31

## 2025-05-12 RX ADMIN — Medication 2 TABLET(S): at 21:07

## 2025-05-12 RX ADMIN — Medication 250 MILLIGRAM(S): at 21:07

## 2025-05-12 NOTE — PHYSICAL THERAPY INITIAL EVALUATION ADULT - DIAGNOSIS, PT EVAL
+UTI, acute renal failure likely due to chronic obstructive uropathy, L ureteral obstruction, h/o chronic UR with chronic indwelling Rosario catheter, Fe deficient anemia ,r/o occult blood loss/GIB , unintentional weight loss, r/o occult malignancy

## 2025-05-12 NOTE — PHYSICAL THERAPY INITIAL EVALUATION ADULT - NSACTIVITYREC_GEN_A_PT
out of bed to chair daily ( ie-meals) amb with least restrictive assistive device on unit as tolerated ,to/from bathroom as needed .Encourage PO intake given recent weight loss

## 2025-05-12 NOTE — CONSULT NOTE ADULT - ASSESSMENT
89-year-old F with PMHx bladder prolapse, urinary retention with chronic nuñez in place admitted for acute UTI and anemia.    Imp:  1. BENITO likely multifactorial d/t nutritional deficits, chronic disease  2. Weight loss and decreased appetite, likely r/t depression, anemia, FRANCIS, possible underlying malignancy given retroperitoneal adenopathy on CT   3. Markedly distended gallbladder, chronic     Recs:  :: BENITO likely multifactorial and BMs regular without overt signs of bleeding but given lack of screenings prior recommend close OP GI f/u once UTI resolved  :: Trend H/H, transfuse PRBCs if Hgb < 7.0, infuse iron per hematology recs  :: Monitor biliary status given markedly distended gallbladder- likely chronic, however should RUQ pain, fever, or other signs of cholecystitis arise GI + surgery should be reconsulted   :: Dietary counseling given nutritional status   :: Depression screening and management per primary team recs

## 2025-05-12 NOTE — PHYSICAL THERAPY INITIAL EVALUATION ADULT - MUSCLE TONE ASSESSMENT, REHAB EVAL
pt with lean build,diffusely decreased muscle mass/bulk with h/o 20# unintentional weight loss recently/bilateral upper extremities/bilateral lower extremities/normal/mildly decreased tone

## 2025-05-12 NOTE — PHYSICAL THERAPY INITIAL EVALUATION ADULT - CRITERIA FOR SKILLED THERAPEUTIC INTERVENTIONS
risk reduction/prevention/rehab potential/therapy frequency/predicted duration of therapy intervention/anticipated equipment needs at discharge/anticipated discharge recommendation

## 2025-05-12 NOTE — PROGRESS NOTE ADULT - ASSESSMENT
89 Y/O Female with UTI. Urinary retention with chronic nuñez. Bladder prolapse. FRANCIS, Severe left hydroureteronephrosis with mixed attenuation the dilated left renal collecting system which may reflect underlying clot or mass. Retroperitoneal adenopathy.   Recommend  - F/U UA/UCx and treat accordingly   - Trend Creat  - F/U MR urogram - ordered  - Further recs after urogram.     Case discussed with Dr. Seals

## 2025-05-12 NOTE — PHYSICAL THERAPY INITIAL EVALUATION ADULT - LEVEL OF INDEPENDENCE: SIT/SUPINE, REHAB EVAL
out of bed to chair at bedside with TT in place ,replaced ice water pitcher at patients request and CBIR

## 2025-05-12 NOTE — PHYSICAL THERAPY INITIAL EVALUATION ADULT - PERSONAL SAFETY AND JUDGMENT, REHAB EVAL
pt had fall ?OOB last evening when she got up unassisted to attempt to straighten blankets at FOB ,landed on R hip/at risk behaviors demonstrated

## 2025-05-12 NOTE — PROGRESS NOTE ADULT - ASSESSMENT
8AKI  -Rising function, voulume ok  Can stop the seriies per medicine  avoid nsaids/contrast  Outpatient fu for CKd managment    thanks, will follow     88 y/o F w/ PMH of bladder prolapse, urinary retention s/p chronic nuñez, p/w fatigue w renal evaluation of CKD 4    CKD  4, sec to obstructive uropathy    severe left hydronephrosis  Patient refusing MRI, maintaining admit   medical fu re plan    dc w staff, Dr Contreras  Note error corrected

## 2025-05-12 NOTE — PROGRESS NOTE ADULT - ASSESSMENT
88 y/o F w/ PMH of bladder prolapse, urinary retention s/p chronic nuñez, p/w fatigue. Patient states that she has been very fatigued over the last few days. Even walking a short distance she feels very winded. States that has been having decreased appetite over the last month, and has had unintentional weight loss of about 20 lbs. She denies any hematochezia / melena / nausea / vomiting / abdominal pain. Patient states that she doesn't have a PCP, and doesn't follow up with any doctors outpatient, except for her urologist. Denies having a colonoscopy in the past. Noted with pyuria, ua positive concern for UTI, started on IV abx.     Pyuria. UTI. Urinary retention with chronic nuñez. Bladder prolapse. FRANCIS  - ct head findings noted, no sinusitis sxs - do not treat  - on rocephin 1gm daily #4  - f/u urine cx  contaminated, blood cx no growth   - continue with abx coverage  - monitor temps  - tolerating abx well so far; no side effects noted  - reason for abx use and side effects reviewed with patient  - supportive care  - fu cbc  - urology eval noted f/u MRI urogram    Concern for infection with multi-resistant organisms was raised. Prior cultures reviewed. An epidemiologic assessment was performed. There is a significant risk for resistant microorganisms to spread to family members, and/or healthcare staff. The patient will be placed on isolation according to infection control policy. Will reconsider isolation measures based on new culture results and other clinical data as appropriate. Appropriate cultures collected and an appropriate broad spectrum antibiotic therapy will be considered.      Clinical team may change from intravenous to oral antibiotics when the following criteria are met:   1. Patient is clinically improving/stable       a)	Improved signs and symptoms of infection from initial presentation       b)	Afebrile for 24 hours       c)	Leukocytosis trending towards normal range   2. Patient is tolerating oral intake   3. Initial/repeat blood cultures are negative     - on dc po ceftin 250mg BID x 7 day course

## 2025-05-12 NOTE — PHYSICAL THERAPY INITIAL EVALUATION ADULT - NSPTDMEREC_GEN_A_CORE
pt will require a RW on discharge due to diagnosis of gait instability/generalized weakness/rolling walker

## 2025-05-12 NOTE — CONSULT NOTE ADULT - SUBJECTIVE AND OBJECTIVE BOX
Patient is a 89y old  Female who presents with a chief complaint of fatigue (12 May 2025 13:14)      HPI: This is an 89-year-old F with PMHx of bladder prolapse, urinary retention with chronic nuñez in place who presented to ED with fatigue, depression, decreased appetite and 20-lb weight loss x 6 weeks. She reports feeling winded even with short distances.  She denies abdominal pain, melena, hematochezia, diarrhea, constipation, nausea, vomiting, fevers. She does not have a PCP nor OP GI MD and has never had a COL nor EGD.     In ED she was found to have normocytic anemia with H/H 7.5/25.1 for which she received 1u PRBCs. Creatinine elevated to 2.55, eGFR decreased at 18, grossly positive UA, and CT AP demonstrating Severe left hydroureteronephrosis with mixed attenuation the dilated left renal collecting system which may reflect underlying clot or mass. Retroperitoneal adenopathy. Cholelithiasis with markedly distended gallbladder similar to prior. LFTs however wnl.     Today H/H 8.4/27.7, and she reports feeling less fatigued/weak though has not ambulated much. She continues to deny any GI complaints: last BM yesterday - formed and brown, non-bloody. No abdominal pain, nausea, nor vomiting. Tolerating PO intake.              PAST MEDICAL & SURGICAL HISTORY:  No pertinent past medical history      No significant past surgical history          MEDICATIONS  (STANDING):  ALPRAZolam 0.25 milliGRAM(s) Oral once  diazepam    Tablet 5 milliGRAM(s) Oral once  iron sucrose IVPB 100 milliGRAM(s) IV Intermittent every 24 hours  pantoprazole    Tablet 40 milliGRAM(s) Oral before breakfast  polyethylene glycol 3350 17 Gram(s) Oral daily  senna 2 Tablet(s) Oral at bedtime    MEDICATIONS  (PRN):  melatonin 5 milliGRAM(s) Oral at bedtime PRN Sleep      Allergies    No Known Allergies    Intolerances        SOCIAL HISTORY:    FAMILY HISTORY:      REVIEW OF SYSTEMS:    CONSTITUTIONAL: + Weakness, though improved. No fevers or chills  EYES/ENT: No visual changes;  No vertigo or throat pain   NECK: No pain or stiffness  RESPIRATORY: No cough, wheezing, hemoptysis; No shortness of breath  CARDIOVASCULAR: No chest pain or palpitations  GASTROINTESTINAL: No abdominal or epigastric pain. No nausea, vomiting, or hematemesis; No diarrhea or constipation. No melena or hematochezia.  GENITOURINARY: Nuñez catheter in place. No hematuria  NEUROLOGICAL: No numbness or weakness  SKIN: No itching, burning, rashes, or lesions   PSYCH: Normal mood and affect  All other review of systems is negative unless indicated above.    Vital Signs Last 24 Hrs  T(C): 36.7 (12 May 2025 07:38), Max: 37.2 (11 May 2025 16:33)  T(F): 98.1 (12 May 2025 07:38), Max: 98.9 (11 May 2025 16:33)  HR: 75 (12 May 2025 07:38) (64 - 75)  BP: 93/70 (12 May 2025 07:38) (91/51 - 111/62)  BP(mean): 77 (12 May 2025 07:38) (77 - 77)  RR: 15 (12 May 2025 07:38) (15 - 18)  SpO2: 93% (12 May 2025 07:38) (93% - 97%)    Parameters below as of 12 May 2025 07:38  Patient On (Oxygen Delivery Method): room air        PHYSICAL EXAM:    Constitutional: No acute distress, well-developed, non-toxic appearing  HEENT: masked, good phonation, not icteric  Neck: supple, no lymphadenopathy  Respiratory: no audible wheezing  Cardiovascular: regular rate and rhythm  Gastrointestinal: soft, non-tender, non-distended, +bowel sounds, no rebound or guarding, no surgical scars, no drains  Extremities: No peripheral edema, no cyanosis or clubbing  Vascular: 2+ peripheral pulses, no venous stasis  Neurological: A/O x 3, no focal deficits, no asterixis  Psychiatric: Normal mood, normal affect  Skin: No rashes, not jaundiced    LABS:                        8.4    6.03  )-----------( 584      ( 12 May 2025 11:00 )             27.7     05-12    136  |  107  |  23  ----------------------------<  111[H]  4.6   |  24  |  2.34[H]    Ca    9.2      12 May 2025 11:00            RADIOLOGY & ADDITIONAL STUDIES:    ACC: [id number] EXAM:  CT CHEST   ORDERED BY: ANEUDY MOHAMUD     ACC: [id number] EXAM:  CT ABDOMEN AND PELVIS   ORDERED BY: ANEUDY MOHAMUD     PROCEDURE DATE:  05/10/2025          INTERPRETATION:  CLINICAL INFORMATION: Unintentional weight loss loss.    COMPARISON: CT chest abdomen pelvis 3/6/2020    CONTRAST/COMPLICATIONS:  IV Contrast: None  Oral Contrast: None    PROCEDURE:  CT of the Chest, Abdomen and Pelvis was performed.  Sagittal and coronal reformats were performed.    FINDINGS:  CHEST:  LUNGS AND LARGE AIRWAYS: Patent central airways. Few scattered calcified   and noncalcified sub-5 mm pulmonary nodules. Small volume linear and   passive atelectasis bilateral lower lobes.  PLEURA: Trace bilateral pleural effusion.  VESSELS: Aortic calcifications. Coronary artery calcifications.  HEART: Heart size is normal. No pericardial effusion.  MEDIASTINUM AND BREONNA: No lymphadenopathy.  CHEST WALL AND LOWER NECK: Within normal limits.    ABDOMEN AND PELVIS:  LIVER: Fluid attenuation lesion in the right hepatic lobe measuring 0.8 x   1.0 cm, most consistent with benign etiology such as cyst or hemangioma.  BILE DUCTS: Normal caliber.  GALLBLADDER: Prominently distended. Cholelithiasis.  SPLEEN: Within normal limits.  PANCREAS: Within normal limits.  ADRENALS: Within normal limits.  KIDNEYS/URETERS: Right renal atrophy with nondilated collecting system.    Severe left hydroureteronephrosis extending to the level the bladder with   new urothelial thickening and mixed attenuation in the renal collecting   system and overlying cortical thinning. Asymmetric left perinephric   stranding. Redemonstrated exophytic fluid attenuation cyst left renal   cyst.    BLADDER: Collapsed around a Nuñez catheter.  REPRODUCTIVE ORGANS: Atrophic uterus. No adnexal mass.    BOWEL: No bowel obstruction. Appendix is not discretely visualized.   Colonic diverticulosis without evidence of diverticulitis. Nonobstructive   small bowel within a left inguinal hernia.  PERITONEUM/RETROPERITONEUM: Within normal limits.  VESSELS: Ectatic abdominal aorta with diffuse calcified plaques. Diffuse   atherosclerotic calcifications.  LYMPH NODES: Multiple enlarged lymph nodes along the left ureter and   periaortic/perirenal chain measuring up to 9 mm in short axis  ABDOMINAL WALL: Left inguinal hernia containing fat and nonobstructed   small bowel, as per above..  BONES: Status post right hip arthroplasty. Degenerative changes spine.    IMPRESSION:  Severe left hydroureteronephrosis with mixed attenuation the dilated left   renal collecting system which may reflect underlying clot or mass.    Retroperitoneal adenopathy.    Recommend follow-up CT versus MR urogram for further assessment.    Cholelithiasis with markedly distended gallbladder similar to prior.   Correlate with LFTs.        --- End of Report ---          JW PALACIOS MD; Resident Radiologist  This document has been electronically signed.  CHINO PALACIOS MD; Attending Radiologist  This document has been electronically signed. May 10 2025  2:28PM   Patient is a 89y old  Female who presents with a chief complaint of fatigue (12 May 2025 13:14)    89 year old woman with urinary retention and chronic nuñez, admitted with fatigue, found to have anemia, UTI, and Left hydrouretonephrosis. Consulted for anemia.     In terms of anemia, patient denies any overt signs of Gi bleeding. No hematochezia, melena, or hematemesis. Stools have not changed at all, normal brown. No abdominal pain. She does report fatigue and feeling tired. Has decreased appetite and endorses a 20 pound weight loss over the last few months. When does eat is able to tolerate. Has never had endoscopy or colonoscopy. In the ED she was found to have normocytic anemia with H/H 7.5/25.1 for which she received 1u PRBCs. Creatinine elevated to 2.55, eGFR decreased at 18, grossly positive UA, and CT AP demonstrating Severe left hydroureteronephrosis with mixed attenuation the dilated left renal collecting system which may reflect underlying clot or mass. Retroperitoneal adenopathy. Cholelithiasis with markedly distended gallbladder similar to prior. LFTs however wnl. Today H/H 8.4/27.7, and she reports feeling less fatigued/weak though has not ambulated much. She continues to deny any GI complaints: last BM yesterday - formed and brown, non-bloody. No abdominal pain, nausea, nor vomiting. Tolerating PO intake.        PAST MEDICAL & SURGICAL HISTORY:  bladder/urinary retention s/p chronic indwelling nuñez      MEDICATIONS  (STANDING):  ALPRAZolam 0.25 milliGRAM(s) Oral once  diazepam    Tablet 5 milliGRAM(s) Oral once  iron sucrose IVPB 100 milliGRAM(s) IV Intermittent every 24 hours  pantoprazole    Tablet 40 milliGRAM(s) Oral before breakfast  polyethylene glycol 3350 17 Gram(s) Oral daily  senna 2 Tablet(s) Oral at bedtime    MEDICATIONS  (PRN):  melatonin 5 milliGRAM(s) Oral at bedtime PRN Sleep      Allergies    No Known Allergies    Intolerances    SOCIAL HISTORY:  no smoking, drinking or drugs    FAMILY HISTORY:  no colon or stomach cancer    REVIEW OF SYSTEMS:    CONSTITUTIONAL: + Weakness, No fevers or chills  EYES/ENT: No visual changes;  No vertigo or throat pain   NECK: No pain or stiffness  RESPIRATORY: No cough, wheezing, hemoptysis; No shortness of breath  CARDIOVASCULAR: No chest pain or palpitations  GASTROINTESTINAL: No abdominal or epigastric pain. No nausea, vomiting, or hematemesis; No diarrhea or constipation. No melena or hematochezia. +poor appetite  GENITOURINARY: see HPI  NEUROLOGICAL: No numbness or weakness  SKIN: No itching, burning, rashes, or lesions   PSYCH: Normal mood and affect  All other review of systems is negative unless indicated above.    Vital Signs Last 24 Hrs  T(C): 36.7 (12 May 2025 07:38), Max: 37.2 (11 May 2025 16:33)  T(F): 98.1 (12 May 2025 07:38), Max: 98.9 (11 May 2025 16:33)  HR: 75 (12 May 2025 07:38) (64 - 75)  BP: 93/70 (12 May 2025 07:38) (91/51 - 111/62)  BP(mean): 77 (12 May 2025 07:38) (77 - 77)  RR: 15 (12 May 2025 07:38) (15 - 18)  SpO2: 93% (12 May 2025 07:38) (93% - 97%)    Parameters below as of 12 May 2025 07:38  Patient On (Oxygen Delivery Method): room air        PHYSICAL EXAM:    Constitutional: No acute distress, well-developed, non-toxic appearing  HEENT: unmasked, good phonation, not icteric  Neck: supple, no lymphadenopathy  Respiratory: no audible wheezing  Cardiovascular: regular rate and rhythm  Gastrointestinal: soft, non-tender, non-distended, +bowel sounds, no rebound or guarding, no surgical scars, no drains  Extremities: No peripheral edema, no cyanosis or clubbing  Vascular: 2+ peripheral pulses, no venous stasis  Neurological: A/O x 3, no focal deficits, no asterixis  Psychiatric: Normal mood, normal affect  Skin: No rashes, not jaundiced    LABS:                        8.4    6.03  )-----------( 584      ( 12 May 2025 11:00 )             27.7     05-12    136  |  107  |  23  ----------------------------<  111[H]  4.6   |  24  |  2.34[H]    Ca    9.2      12 May 2025 11:00            RADIOLOGY & ADDITIONAL STUDIES:    ACC: [id number] EXAM:  CT CHEST   ORDERED BY: ANEUDY MOHAMUD     ACC: [id number] EXAM:  CT ABDOMEN AND PELVIS   ORDERED BY: ANEUDY MOHAMUD     PROCEDURE DATE:  05/10/2025          INTERPRETATION:  CLINICAL INFORMATION: Unintentional weight loss loss.    COMPARISON: CT chest abdomen pelvis 3/6/2020    CONTRAST/COMPLICATIONS:  IV Contrast: None  Oral Contrast: None    PROCEDURE:  CT of the Chest, Abdomen and Pelvis was performed.  Sagittal and coronal reformats were performed.    FINDINGS:  CHEST:  LUNGS AND LARGE AIRWAYS: Patent central airways. Few scattered calcified   and noncalcified sub-5 mm pulmonary nodules. Small volume linear and   passive atelectasis bilateral lower lobes.  PLEURA: Trace bilateral pleural effusion.  VESSELS: Aortic calcifications. Coronary artery calcifications.  HEART: Heart size is normal. No pericardial effusion.  MEDIASTINUM AND BREONNA: No lymphadenopathy.  CHEST WALL AND LOWER NECK: Within normal limits.    ABDOMEN AND PELVIS:  LIVER: Fluid attenuation lesion in the right hepatic lobe measuring 0.8 x   1.0 cm, most consistent with benign etiology such as cyst or hemangioma.  BILE DUCTS: Normal caliber.  GALLBLADDER: Prominently distended. Cholelithiasis.  SPLEEN: Within normal limits.  PANCREAS: Within normal limits.  ADRENALS: Within normal limits.  KIDNEYS/URETERS: Right renal atrophy with nondilated collecting system.    Severe left hydroureteronephrosis extending to the level the bladder with   new urothelial thickening and mixed attenuation in the renal collecting   system and overlying cortical thinning. Asymmetric left perinephric   stranding. Redemonstrated exophytic fluid attenuation cyst left renal   cyst.    BLADDER: Collapsed around a Nuñez catheter.  REPRODUCTIVE ORGANS: Atrophic uterus. No adnexal mass.    BOWEL: No bowel obstruction. Appendix is not discretely visualized.   Colonic diverticulosis without evidence of diverticulitis. Nonobstructive   small bowel within a left inguinal hernia.  PERITONEUM/RETROPERITONEUM: Within normal limits.  VESSELS: Ectatic abdominal aorta with diffuse calcified plaques. Diffuse   atherosclerotic calcifications.  LYMPH NODES: Multiple enlarged lymph nodes along the left ureter and   periaortic/perirenal chain measuring up to 9 mm in short axis  ABDOMINAL WALL: Left inguinal hernia containing fat and nonobstructed   small bowel, as per above..  BONES: Status post right hip arthroplasty. Degenerative changes spine.    IMPRESSION:  Severe left hydroureteronephrosis with mixed attenuation the dilated left   renal collecting system which may reflect underlying clot or mass.    Retroperitoneal adenopathy.    Recommend follow-up CT versus MR urogram for further assessment.    Cholelithiasis with markedly distended gallbladder similar to prior.   Correlate with LFTs.        --- End of Report ---          JW PALACIOS MD; Resident Radiologist  This document has been electronically signed.  CHINO PALACIOS MD; Attending Radiologist  This document has been electronically signed. May 10 2025  2:28PM

## 2025-05-12 NOTE — CONSULT NOTE ADULT - NS ATTEND AMEND GEN_ALL_CORE FT
89 year old woman with urinary retention and chronic nuñez, admitted with fatigue, found to have anemia, UTI, and Left hydrouretonephrosis. Consulted for anemia.     Anemia in someone who has never had colon cancer screening is always concerning, although she has other pressing issues now like UTI, hydronephrosis, RP adenopathy, ?mass in kidney. Anemia is likely mulitfactorial and she has no overt bleeding.     Will need close interval follow up for egd/colon.   If here for prolonged admission because of the above issues, can do egd/colon while admitted just prior to discharge.

## 2025-05-12 NOTE — PHYSICAL THERAPY INITIAL EVALUATION ADULT - PRECAUTIONS/LIMITATIONS, REHAB EVAL
per chart pt with fall out of bed last evening 1930pm while trying to straighten the bed covers/fall precautions

## 2025-05-12 NOTE — PHYSICAL THERAPY INITIAL EVALUATION ADULT - ACTIVE RANGE OF MOTION EXAMINATION, REHAB EVAL
nohemy. upper extremity Active ROM was WNL (within normal limits)/bilateral  lower extremity Active ROM was WFL (within functional limits)

## 2025-05-12 NOTE — PHYSICAL THERAPY INITIAL EVALUATION ADULT - PERTINENT HX OF CURRENT PROBLEM, REHAB EVAL
88 y/o F w/ PMH of bladder prolapse, urinary retention s/p chronic nuñez, p/w fatigue. Patient states that she has been very fatigued over the last few days. Even walking a short distance she feels very winded. States that has been having decreased appetite over the last month, and has had unintenitonal weight loss of about 20 lbs. She denies any hematochezia / melena / nausea / vomiting / abdominal pain. Patient states that she doesn't have a PCP, and doesn't follow up with any doctors outpatient, except for her urologist. Denies having a colonoscopy in the past.

## 2025-05-12 NOTE — PHYSICAL THERAPY INITIAL EVALUATION ADULT - GENERAL OBSERVATIONS, REHAB EVAL
Pt resting semi-reclined in bed with Rosario catheter intact ,milky/cloudy urine noted in tubing. Pt intent on doning her underpants prior to OOB/amb with PT and was assisted by RN in sitting to don panties.Pt wears eyeglasses,appears underweight and reports 20# unintentional weight loss associated with depression over family matters "I let the old lady in!"

## 2025-05-13 PROCEDURE — 72197 MRI PELVIS W/O & W/DYE: CPT | Mod: 26

## 2025-05-13 PROCEDURE — 74183 MRI ABD W/O CNTR FLWD CNTR: CPT | Mod: 26

## 2025-05-13 PROCEDURE — 99232 SBSQ HOSP IP/OBS MODERATE 35: CPT

## 2025-05-13 RX ADMIN — DIAZEPAM 5 MILLIGRAM(S): 2 TABLET ORAL at 15:08

## 2025-05-13 RX ADMIN — IRON SUCROSE 210 MILLIGRAM(S): 20 INJECTION, SOLUTION INTRAVENOUS at 06:01

## 2025-05-13 RX ADMIN — Medication 2 TABLET(S): at 22:30

## 2025-05-13 RX ADMIN — Medication 250 MILLIGRAM(S): at 09:54

## 2025-05-13 RX ADMIN — POLYETHYLENE GLYCOL 3350 17 GRAM(S): 17 POWDER, FOR SOLUTION ORAL at 09:57

## 2025-05-13 RX ADMIN — Medication 250 MILLIGRAM(S): at 22:30

## 2025-05-13 RX ADMIN — Medication 40 MILLIGRAM(S): at 06:01

## 2025-05-13 NOTE — DIETITIAN INITIAL EVALUATION ADULT - PHYSICAL ASSESSMENT FAT OVERLYING RIBCAGE
severe Fusiform Excision Additional Text (Leave Blank If You Do Not Want): The margin was drawn around the clinically apparent lesion.  A fusiform shape was then drawn on the skin incorporating the lesion and margins.  Incisions were then made along these lines to the appropriate tissue plane and the lesion was extirpated.

## 2025-05-13 NOTE — DIETITIAN INITIAL EVALUATION ADULT - ADD RECOMMEND
1) C/w regular diet. Encourage protein-rich foods, maximize food preferences  2) RD will add HH recipe High-protein mousse daily (provides 394 kcal, 14g protein/ container); dtr to provide Boost shakes from home as pt does not consume ensure shakes   3) Monitor bowel movements, if no BM for >3 days, consider implementing stronger bowel regimen.   4) MVI w/ minerals daily to ensure 100% RDA met   5) Consider adding thiamine 100 mg daily 2/2 poor PO intake/ malnutrition   6) Consider adding appetite stimulant such as Remeron or Marinol 2/2 chronically poor appetite/ PO intake   7) Obtain weekly wt to track/trend changes  8) consider checking B6, B12, thiamine, folate, carnitine, and copper levels as malnutrition in cause these to be deficient  9) Confirm goals of care regarding nutrition support  Nutrition recommendations to continue upon discharge. Goal to continue to meet >/= 80% ENN via tolerated route. RD to F/U prn for changes to nutrition dc plan. RD will continue to monitor PO intake, labs, hydration, and wt prn.

## 2025-05-13 NOTE — DIETITIAN INITIAL EVALUATION ADULT - NSFNSGIIOFT_GEN_A_CORE
I&O's Detail    12 May 2025 07:01  -  13 May 2025 07:00  --------------------------------------------------------  IN:  Total IN: 0 mL    OUT:    Indwelling Catheter - Urethral (mL): 1100 mL    Voided (mL): 1800 mL  Total OUT: 2900 mL    Total NET: -2900 mL

## 2025-05-13 NOTE — DIETITIAN INITIAL EVALUATION ADULT - PERTINENT LABORATORY DATA
05-12    136  |  107  |  23  ----------------------------<  111[H]  4.6   |  24  |  2.34[H]    Ca    9.2      12 May 2025 11:00    A1C with Estimated Average Glucose Result: 6.4 % (05-10-25 @ 07:30)

## 2025-05-13 NOTE — PROGRESS NOTE ADULT - ASSESSMENT
88 Y/O Female with UTI. Urinary retention with chronic nuñez. Bladder prolapse. FRANCIS, Severe left hydroureteronephrosis with mixed attenuation the dilated left renal collecting system which may reflect underlying clot or mass. Retroperitoneal adenopathy.   Recommend  - F/U UA/UCx and treat accordingly   - Trend Creat  - F/U MR urogram - ordered, pending- pt needs sedation for test.  - Further recs after urogram.     Case discussed with Dr. Seals

## 2025-05-13 NOTE — DIETITIAN INITIAL EVALUATION ADULT - NS FNS DIET ORDER
If MRIs stable we could repeat a blood patch    If MRI worse in the neck we will see what Dr. Moncada says before repeating a blood patch.  
Diet, Regular (05-09-25 @ 22:40) [Active]

## 2025-05-13 NOTE — DIETITIAN INITIAL EVALUATION ADULT - OTHER INFO
89 y/o F w/ PMH of bladder prolapse, urinary retention s/p chronic nuñez, p/w fatigue. Patient states that she has been very fatigued over the last few days. Even walking a short distance she feels very winded. States that has been having decreased appetite over the last month, and has had unintentional weight loss of about 20 lbs. Admitted for Anemia / Unintentional weight loss - r/o Malignancy. GI following, per note: BENITO likely multifactorial d/t nutritional deficits, chronic disease; Weight loss and decreased appetite, likely r/t depression, anemia, FRANCIS, possible underlying malignancy given retroperitoneal adenopathy on CT.    Visited pt at bed side this AM. Currently on regular diet. Denies significant changes to appetite since admission, remains poor to fair. Endorses 20# wt loss x 2 mo, stating UBW at 120# and current wt at 100#. Unable to obtain bed scale wt 2/2 sitting in chair. EMR wt doc'd at 108#. Wt loss of ~12#/ 10.0% x 2 mo - severe and clinically significant. NFPE reveals severe muscle/fat wasting. C/w regular diet. Since pt dislikes ensure shakes, d/w pt on having pt's dtr provide Boost shakes from home (HH does not carry Boost) which pt agreeable to. Will also add  recipe High-protein mousse daily (provides 394 kcal, 14g protein/container). Consider adding appetite stimulant such as Remeron or Marinol 2/2 chronically poor appetite/ PO intake. See other recs below.

## 2025-05-13 NOTE — DIETITIAN INITIAL EVALUATION ADULT - MALNUTRITION
Pt meets criteria for severe protein-calorie malnutrition in context of social or environmental circumstances

## 2025-05-13 NOTE — DIETITIAN INITIAL EVALUATION ADULT - PERTINENT MEDS FT
MEDICATIONS  (STANDING):  cefuroxime   Tablet 250 milliGRAM(s) Oral every 12 hours  diazepam    Tablet 5 milliGRAM(s) Oral once  iron sucrose IVPB 100 milliGRAM(s) IV Intermittent every 24 hours  pantoprazole    Tablet 40 milliGRAM(s) Oral before breakfast  polyethylene glycol 3350 17 Gram(s) Oral daily  senna 2 Tablet(s) Oral at bedtime    MEDICATIONS  (PRN):  melatonin 5 milliGRAM(s) Oral at bedtime PRN Sleep

## 2025-05-13 NOTE — PROGRESS NOTE ADULT - NUTRITIONAL ASSESSMENT
This patient has been assessed with a concern for Malnutrition and has been determined to have a diagnosis/diagnoses of Severe protein-calorie malnutrition and Underweight (BMI < 19).    This patient is being managed with:   Diet Regular-  Entered: May  9 2025 10:37PM  

## 2025-05-13 NOTE — PROGRESS NOTE ADULT - ASSESSMENT
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

## 2025-05-13 NOTE — DIETITIAN INITIAL EVALUATION ADULT - ORAL INTAKE PTA/DIET HISTORY
Endorses poor appetite x 2 months 2/2 depression. Does not follow specific diet at home. Normally consumes 2 meals per day (skips breakfast). Has been consuming Boost shakes daily (states ensure shakes caused her to vomit so she no longer consumes). Meeting <50% ENN PTA.

## 2025-05-14 ENCOUNTER — NON-APPOINTMENT (OUTPATIENT)
Age: 89
End: 2025-05-14

## 2025-05-14 ENCOUNTER — TRANSCRIPTION ENCOUNTER (OUTPATIENT)
Age: 89
End: 2025-05-14

## 2025-05-14 VITALS
OXYGEN SATURATION: 100 % | RESPIRATION RATE: 18 BRPM | TEMPERATURE: 98 F | SYSTOLIC BLOOD PRESSURE: 101 MMHG | HEART RATE: 85 BPM | DIASTOLIC BLOOD PRESSURE: 71 MMHG

## 2025-05-14 PROCEDURE — 99239 HOSP IP/OBS DSCHRG MGMT >30: CPT

## 2025-05-14 RX ORDER — CEFUROXIME SODIUM 1.5 G
1 VIAL (EA) INJECTION
Qty: 20 | Refills: 0
Start: 2025-05-14 | End: 2025-05-23

## 2025-05-14 RX ORDER — SENNA 187 MG
2 TABLET ORAL
Qty: 0 | Refills: 0 | DISCHARGE
Start: 2025-05-14

## 2025-05-14 RX ORDER — POLYETHYLENE GLYCOL 3350 17 G/17G
17 POWDER, FOR SOLUTION ORAL
Qty: 0 | Refills: 0 | DISCHARGE
Start: 2025-05-14

## 2025-05-14 RX ORDER — FERROUS SULFATE 137(45) MG
1 TABLET, EXTENDED RELEASE ORAL
Qty: 30 | Refills: 0
Start: 2025-05-14

## 2025-05-14 RX ADMIN — IRON SUCROSE 210 MILLIGRAM(S): 20 INJECTION, SOLUTION INTRAVENOUS at 06:08

## 2025-05-14 RX ADMIN — Medication 250 MILLIGRAM(S): at 09:33

## 2025-05-14 RX ADMIN — POLYETHYLENE GLYCOL 3350 17 GRAM(S): 17 POWDER, FOR SOLUTION ORAL at 11:36

## 2025-05-14 RX ADMIN — Medication 40 MILLIGRAM(S): at 06:08

## 2025-05-14 NOTE — DISCHARGE NOTE PROVIDER - PROVIDER TOKENS
PROVIDER:[TOKEN:[[id number]],FOLLOWUP:[2 weeks]],FREE:[LAST:[Gyn],PHONE:[(   )    -],FAX:[(   )    -],FOLLOWUP:[2 weeks]],PROVIDER:[TOKEN:[[id number]],FOLLOWUP:[2 weeks]]

## 2025-05-14 NOTE — PROGRESS NOTE ADULT - PROVIDER SPECIALTY LIST ADULT
Hospitalist
Infectious Disease
Urology
Infectious Disease
Infectious Disease
Urology
Hospitalist
Urology
Nephrology
Nephrology

## 2025-05-14 NOTE — DISCHARGE NOTE PROVIDER - DETAILS OF MALNUTRITION DIAGNOSIS/DIAGNOSES
This patient has been assessed with a concern for Malnutrition and was treated during this hospitalization for the following Nutrition diagnosis/diagnoses:     -  05/13/2025: Severe protein-calorie malnutrition   -  05/13/2025: Underweight (BMI < 19)

## 2025-05-14 NOTE — DISCHARGE NOTE PROVIDER - NSDCMRMEDTOKEN_GEN_ALL_CORE_FT
cefuroxime 250 mg oral tablet: 1 tab(s) orally every 12 hours  ferrous sulfate 325 mg (65 mg elemental iron) oral delayed release tablet: 1 tab(s) orally once a day  pantoprazole 40 mg oral delayed release tablet: 1 tab(s) orally once a day (before a meal)  polyethylene glycol 3350 oral powder for reconstitution: 17 gram(s) orally once a day  senna leaf extract oral tablet: 2 tab(s) orally once a day (at bedtime)

## 2025-05-14 NOTE — DISCHARGE NOTE PROVIDER - NSDCCPCAREPLAN_GEN_ALL_CORE_FT
PRINCIPAL DISCHARGE DIAGNOSIS  Diagnosis: Pyelonephritis  Assessment and Plan of Treatment: complete Abx at home      SECONDARY DISCHARGE DIAGNOSES  Diagnosis: Chronic kidney disease (CKD)  Assessment and Plan of Treatment: L hydronephrosis , chronic , not changed since 2020, follow up with urology    Diagnosis: Anemia  Assessment and Plan of Treatment: take Iron supplements , follow up with GI    Diagnosis: Endometrial thickening on ultrasound  Assessment and Plan of Treatment: follow up with GYN

## 2025-05-14 NOTE — DISCHARGE NOTE PROVIDER - NSDCFUSCHEDAPPT_GEN_ALL_CORE_FT
[facility]  UROLOGY [address]  Scheduled Appointment: 06/05/2025    Calixto Martinez  [facility]  UROLOGY [address]  Scheduled Appointment: 06/05/2025

## 2025-05-14 NOTE — PROGRESS NOTE ADULT - ASSESSMENT
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  Pt can follow up with Dr. Seals  Above discussed with Dr. Seals

## 2025-05-14 NOTE — DISCHARGE NOTE PROVIDER - CARE PROVIDER_API CALL
Mario Seals  Urology  [address] [place] [address]  Phone: [phone]  Fax: [phone]  Follow Up Time: 2 weeks    Gyn,   Phone: (   )    -  Fax: (   )    -  Follow Up Time: 2 weeks    Beto Tomas  Gastroenterology  [address] [place] [id number]  Phone: [phone]  Fax: [phone]  Follow Up Time: 2 weeks

## 2025-05-14 NOTE — DISCHARGE NOTE PROVIDER - CARE PROVIDERS DIRECT ADDRESSES
[email or web address] [email or web address]

## 2025-05-14 NOTE — DISCHARGE NOTE NURSING/CASE MANAGEMENT/SOCIAL WORK - FINANCIAL ASSISTANCE
[facility] provides services at a reduced cost to those who are determined to be eligible through [facility]’s financial assistance program. Information regarding [facility]’s financial assistance program can be found by going to [email or web address] or by calling [phone].

## 2025-05-14 NOTE — PROGRESS NOTE ADULT - SUBJECTIVE AND OBJECTIVE BOX
History of Present Illness:   89 y/o F w/ PMH of bladder prolapse, urinary retention s/p chronic nuñez, p/w fatigue. Patient states that she has been very fatigued over the last few days. Even walking a short distance she feels very winded. States that has been having decreased appetite over the last month, and has had unintenitonal weight loss of about 20 lbs. She denies any hematochezia / melena / nausea / vomiting / abdominal pain. Patient states that she doesn't have a PCP, and doesn't follow up with any doctors outpatient, except for her urologist. Denies having a colonoscopy in the past.     5.10: denies hematochezia, no cp, no sob            REVIEW OF SYSTEMS:    CONSTITUTIONAL: No weakness, No fevers or chills  ENT: No ear ache, No sorethroat  NECK: No pain, No stiffness  RESPIRATORY: No cough, No wheezing, No hemoptysis; No dyspnea  CARDIOVASCULAR: No chest pain, No palpitations  GASTROINTESTINAL: No abd pain, No nausea, No vomiting, No hematemesis, No diarrhea or constipation. No melena, No hematochezia.  GENITOURINARY: No dysuria, No  hematuria  NEUROLOGICAL: No diplopia, No paresthesia, No motor dysfunction  MUSCULOSKELETAL: No arthralgia, No myalgia  SKIN: No rashes, or lesions   PSYCH: no anxiety, no suicidal ideation    All other review of systems is negative unless indicated above    Vital Signs Last 24 Hrs  T(C): 37 (10 May 2025 16:50), Max: 37 (10 May 2025 16:50)  T(F): 98.6 (10 May 2025 16:50), Max: 98.6 (10 May 2025 16:50)  HR: 78 (10 May 2025 16:50) (70 - 79)  BP: 92/48 (10 May 2025 16:50) (90/69 - 128/78)  BP(mean): --  RR: 17 (10 May 2025 16:50) (17 - 18)  SpO2: 98% (10 May 2025 16:50) (96% - 100%)    Parameters below as of 10 May 2025 16:50  Patient On (Oxygen Delivery Method): room air        PHYSICAL EXAM:    GENERAL: NAD  HEENT:  NC/AT, EOMI, PERRLA, No scleral icterus, Moist mucous membranes  NECK: Supple, No JVD  CNS:  Alert & Oriented X3, Motor Strength 5/5 B/L upper and lower extremities; DTRs 2+ intact   LUNG: Normal Breath sounds, Clear to auscultation bilaterally, No rales, No rhonchi, No wheezing  HEART: RRR; No murmurs, No rubs  ABDOMEN: +BS, ST/ND/NT  GENITOURINARY: Voiding, Bladder not distended  EXTREMITIES:  2+ Peripheral Pulses, No clubbing, No cyanosis, No tibial edema  MUSCULOSKELTAL: Joints normal ROM, No TTP, No effusion  VAGINAL: deferred  SKIN: no rashes  RECTAL: deferred, not indicated  BREAST: deferred                          6.4    6.36  )-----------( 518      ( 10 May 2025 07:30 )             21.2     05-10    132[L]  |  104  |  23  ----------------------------<  142[H]  4.0   |  22  |  2.29[H]    Ca    8.8      10 May 2025 07:30  Mg     2.3     05-09    TPro  6.8  /  Alb  1.7[L]  /  TBili  0.2  /  DBili  x   /  AST  17  /  ALT  14  /  AlkPhos  99  05-10    Vancomycin levels:   Cultures:     MEDICATIONS  (STANDING):  cefTRIAXone Injectable. 1000 milliGRAM(s) IV Push every 24 hours  polyethylene glycol 3350 17 Gram(s) Oral daily  senna 2 Tablet(s) Oral at bedtime    MEDICATIONS  (PRN):  melatonin 5 milliGRAM(s) Oral at bedtime PRN Sleep      all labs reviewed  all imaging reviewed    a/p:      1. Anemia:   r/o occult blood loss due to GIB  Occult blood feces  transfuse 1u RBC   GI Evaluation   c/w PPI    2. Weight loss and severe Protein Calorie Malnutrition:  Ensure     3. Pyuria: r/o Uti  f/u UCx  c/w Ceftriaxone pending UCx    4. Chronic urinary retention:  c/w Nuñez     5. Renal failure 
History of Present Illness:   89 y/o F w/ PMH of bladder prolapse, urinary retention s/p chronic nuñez, p/w fatigue. Patient states that she has been very fatigued over the last few days. Even walking a short distance she feels very winded. States that has been having decreased appetite over the last month, and has had unintenitonal weight loss of about 20 lbs. She denies any hematochezia / melena / nausea / vomiting / abdominal pain. Patient states that she doesn't have a PCP, and doesn't follow up with any doctors outpatient, except for her urologist. Denies having a colonoscopy in the past.     5.10: denies hematochezia, no cp, no sob  5.11: no hematochezia , no bleeding, s/p 1u RBC  5..12: no pain, no n/v/d, tolerated diet  5.13: feels well, no pain, no melena, no hematochezia         REVIEW OF SYSTEMS:    CONSTITUTIONAL: No weakness, No fevers or chills  ENT: No ear ache, No sorethroat  NECK: No pain, No stiffness  RESPIRATORY: No cough, No wheezing, No hemoptysis; No dyspnea  CARDIOVASCULAR: No chest pain, No palpitations  GASTROINTESTINAL: No abd pain, No nausea, No vomiting, No hematemesis, No diarrhea or constipation. No melena, No hematochezia.  GENITOURINARY: No dysuria, No  hematuria  NEUROLOGICAL: No diplopia, No paresthesia, No motor dysfunction  MUSCULOSKELETAL: No arthralgia, No myalgia  SKIN: No rashes, or lesions   PSYCH: no anxiety, no suicidal ideation    All other review of systems is negative unless indicated above    Vital Signs Last 24 Hrs  T(C): 36.7 (13 May 2025 15:04), Max: 36.7 (12 May 2025 15:53)  T(F): 98.1 (13 May 2025 15:04), Max: 98.1 (12 May 2025 15:53)  HR: 76 (13 May 2025 15:04) (75 - 87)  BP: 106/65 (13 May 2025 15:04) (106/60 - 111/55)  BP(mean): 74 (13 May 2025 15:04) (68 - 74)  RR: 17 (13 May 2025 15:04) (15 - 18)  SpO2: 100% (13 May 2025 15:04) (93% - 100%)    Parameters below as of 13 May 2025 15:04  Patient On (Oxygen Delivery Method): room air          PHYSICAL EXAM:    GENERAL: NAD, cachectic   HEENT:  NC/AT, EOMI, PERRLA, No scleral icterus, Moist mucous membranes, +bitemp wasting   NECK: Supple, No JVD  CNS:  Alert & Oriented X3, Motor Strength 5/5 B/L upper and lower extremities; DTRs 2+ intact   LUNG: Normal Breath sounds, Clear to auscultation bilaterally, No rales, No rhonchi, No wheezing  HEART: RRR; No murmurs, No rubs  ABDOMEN: +BS, ST/ND/NT  GENITOURINARY: Voiding, Bladder not distended  EXTREMITIES:  2+ Peripheral Pulses, No clubbing, No cyanosis, No tibial edema  MUSCULOSKELTAL: Joints normal ROM, No TTP, No effusion  SKIN: no rashes  RECTAL: deferred, not indicated  BREAST: deferred               Labs:                        8.4    6.03  )-----------( 584      ( 12 May 2025 11:00 )             27.7     05-12    136  |  107  |  23  ----------------------------<  111[H]  4.6   |  24  |  2.34[H]    Ca    9.2      12 May 2025 11:00             Cultures:     MEDICATIONS  (STANDING):  cefTRIAXone Injectable. 1000 milliGRAM(s) IV Push every 24 hours  polyethylene glycol 3350 17 Gram(s) Oral daily  senna 2 Tablet(s) Oral at bedtime    MEDICATIONS  (PRN):  melatonin 5 milliGRAM(s) Oral at bedtime PRN Sleep      all labs reviewed  all imaging reviewed    a/p:      1. Anemia, Iron deficiency   r/o occult blood loss due to GIB  Occult blood feces pending   s/p 1u RBC   GI Evaluation   c/w PPI  Feosol     2. Weight loss and severe Protein Calorie Malnutrition:  Ensure   will need GI workup to r/o malignancy   will check CEA    3. Uti and L pyelonephritis:  f/u UCx, Blood Cx  CT Abd noted   s/p Ceftriaxone; will c/w Ceftin per ID    4. Chronic urinary retention:  c/w Nuñez     5. 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 Anxiety:  Benzo prn 
Patient is a 89y Female who reports no complaints as new per staff  MEDICATIONS  (STANDING):  cefuroxime   Tablet 250 milliGRAM(s) Oral every 12 hours  diazepam    Tablet 5 milliGRAM(s) Oral once  iron sucrose IVPB 100 milliGRAM(s) IV Intermittent every 24 hours  pantoprazole    Tablet 40 milliGRAM(s) Oral before breakfast  polyethylene glycol 3350 17 Gram(s) Oral daily  senna 2 Tablet(s) Oral at bedtime    MEDICATIONS  (PRN):  melatonin 5 milliGRAM(s) Oral at bedtime PRN Sleep        T(C): , Max: 37 (05-11-25 @ 23:59)  T(F): , Max: 98.6 (05-11-25 @ 23:59)  HR: 87 (05-12-25 @ 15:53)  BP: 109/81 (05-12-25 @ 15:53)  BP(mean): 77 (05-12-25 @ 07:38)  RR: 18 (05-12-25 @ 15:53)  SpO2: 100% (05-12-25 @ 15:53)  Wt(kg): --    05-11 @ 07:01  -  05-12 @ 07:00  --------------------------------------------------------  IN: 0 mL / OUT: 1950 mL / NET: -1950 mL    05-12 @ 07:01  -  05-12 @ 23:33  --------------------------------------------------------  IN: 0 mL / OUT: 1100 mL / NET: -1100 mL          PHYSICAL EXAM:    Constitutional: NAD, well-groomed, well-developed  HEENT: PERRLA, EOMI,  MMM  Neck: No LAD, No JVD  Respiratory: CTAB  Cardiovascular: S1 and S2, RRR  Gastrointestinal: BS+, soft, NT/ND  Extremities: No peripheral edema  Neurological: A/O x 3, no focal deficits  Psychiatric: Normal mood, normal affect  : No Rosario  Skin: No rashes  Access: Not applicable        LABS:                        8.4    6.03  )-----------( 584      ( 12 May 2025 11:00 )             27.7     12 May 2025 11:00    136    |  107    |  23     ----------------------------<  111    4.6     |  24     |  2.34   11 May 2025 07:50    135    |  107    |  25     ----------------------------<  94     4.2     |  22     |  2.29   10 May 2025 07:30    132    |  104    |  23     ----------------------------<  142    4.0     |  22     |  2.29   09 May 2025 17:12    132    |  102    |  29     ----------------------------<  105    4.2     |  22     |  2.55     Ca    9.2        12 May 2025 11:00  Ca    9.3        11 May 2025 07:50  Ca    8.8        10 May 2025 07:30  Ca    9.1        09 May 2025 17:12  Mg     2.3       09 May 2025 17:12    TPro  6.8    /  Alb  1.7    /  TBili  0.2    /  DBili  x      /  AST  17     /  ALT  14     /  AlkPhos  99     10 May 2025 07:30  TPro  8.1    /  Alb  2.0    /  TBili  0.3    /  DBili  x      /  AST  22     /  ALT  20     /  AlkPhos  116    09 May 2025 17:12          Urine Studies:  Urinalysis Basic - ( 12 May 2025 11:00 )    Color: x / Appearance: x / SG: x / pH: x  Gluc: 111 mg/dL / Ketone: x  / Bili: x / Urobili: x   Blood: x / Protein: x / Nitrite: x   Leuk Esterase: x / RBC: x / WBC x   Sq Epi: x / Non Sq Epi: x / Bacteria: x            RADIOLOGY & ADDITIONAL STUDIES:              
Patient is a 89y Female who reports no complaints sa new, after fmaily discussions did not leave ama and agreed to rmi    MEDICATIONS  (STANDING):  cefuroxime   Tablet 250 milliGRAM(s) Oral every 12 hours  iron sucrose IVPB 100 milliGRAM(s) IV Intermittent every 24 hours  pantoprazole    Tablet 40 milliGRAM(s) Oral before breakfast  polyethylene glycol 3350 17 Gram(s) Oral daily  senna 2 Tablet(s) Oral at bedtime    MEDICATIONS  (PRN):  melatonin 5 milliGRAM(s) Oral at bedtime PRN Sleep        T(C): , Max: 36.7 (05-12-25 @ 23:30)  T(F): , Max: 98.1 (05-12-25 @ 23:30)  HR: 76 (05-13-25 @ 15:04)  BP: 106/65 (05-13-25 @ 15:04)  BP(mean): 74 (05-13-25 @ 15:04)  RR: 17 (05-13-25 @ 15:04)  SpO2: 100% (05-13-25 @ 15:04)  Wt(kg): --    05-12 @ 07:01  -  05-13 @ 07:00  --------------------------------------------------------  IN: 0 mL / OUT: 2900 mL / NET: -2900 mL          PHYSICAL EXAM:    Constitutional: NAD, MM  dist  Cardiovascular: S1 and S2  Extremities: chr peripheral edema  Neurological: A/O x 3           LABS:                        8.4    6.03  )-----------( 584      ( 12 May 2025 11:00 )             27.7     12 May 2025 11:00    136    |  107    |  23     ----------------------------<  111    4.6     |  24     |  2.34   11 May 2025 07:50    135    |  107    |  25     ----------------------------<  94     4.2     |  22     |  2.29   10 May 2025 07:30    132    |  104    |  23     ----------------------------<  142    4.0     |  22     |  2.29     Ca    9.2        12 May 2025 11:00  Ca    9.3        11 May 2025 07:50  Ca    8.8        10 May 2025 07:30    TPro  6.8    /  Alb  1.7    /  TBili  0.2    /  DBili  x      /  AST  17     /  ALT  14     /  AlkPhos  99     10 May 2025 07:30          Urine Studies:  Urinalysis Basic - ( 12 May 2025 11:00 )    Color: x / Appearance: x / SG: x / pH: x  Gluc: 111 mg/dL / Ketone: x  / Bili: x / Urobili: x   Blood: x / Protein: x / Nitrite: x   Leuk Esterase: x / RBC: x / WBC x   Sq Epi: x / Non Sq Epi: x / Bacteria: x            RADIOLOGY & ADDITIONAL STUDIES:              
Date of service: 05-12-25 @ 14:28    pt seen and examined  feels better  afebrile  laying in bed, nad    ROS: no fever or chills; denies dizziness, no HA, no SOB or cough, no abdominal pain, no diarrhea or constipation;  no legs pain, no rashes    MEDICATIONS  (STANDING):  ALPRAZolam 0.25 milliGRAM(s) Oral once  cefTRIAXone   IVPB 1000 milliGRAM(s) IV Intermittent every 24 hours  diazepam    Tablet 5 milliGRAM(s) Oral once  iron sucrose IVPB 100 milliGRAM(s) IV Intermittent every 24 hours  pantoprazole    Tablet 40 milliGRAM(s) Oral before breakfast  polyethylene glycol 3350 17 Gram(s) Oral daily  senna 2 Tablet(s) Oral at bedtime    Vital Signs Last 24 Hrs  T(C): 36.7 (12 May 2025 07:38), Max: 37.2 (11 May 2025 16:33)  T(F): 98.1 (12 May 2025 07:38), Max: 98.9 (11 May 2025 16:33)  HR: 75 (12 May 2025 07:38) (64 - 75)  BP: 93/70 (12 May 2025 07:38) (91/51 - 111/62)  BP(mean): 77 (12 May 2025 07:38) (77 - 77)  RR: 15 (12 May 2025 07:38) (15 - 18)  SpO2: 93% (12 May 2025 07:38) (93% - 97%)    Parameters below as of 12 May 2025 07:38  Patient On (Oxygen Delivery Method): room air        PE:  Constitutional: NAD  HEENT: NC/AT, EOMI, PERRLA, conjunctivae clear; ears and nose atraumatic; pharynx benign  Neck: supple; thyroid not palpable  Back: no tenderness  Respiratory: respiratory effort normal; clear to auscultation  Cardiovascular: S1S2 regular, no murmurs  Abdomen: soft, not tender, not distended, positive BS; liver and spleen WNL  Genitourinary: no suprapubic tenderness  Lymphatic: no LN palpable  Musculoskeletal: no muscle tenderness, no joint swelling or tenderness  Extremities: no pedal edema  Neurological/ Psychiatric: AxOx3, Judgement and insight normal;  moving all extremities  Skin: no rashes; no palpable lesions    Labs: all available labs reviewed                        8.4    6.03  )-----------( 584      ( 12 May 2025 11:00 )             27.7     05-12    136  |  107  |  23  ----------------------------<  111[H]  4.6   |  24  |  2.34[H]    Ca    9.2      12 May 2025 11:00           Culture - Blood (05.09.25 @ 19:04)   Specimen Source: Blood None  Culture Results:   No growth at 24 hours  Culture - Blood (05.09.25 @ 19:02)   Specimen Source: Blood None  Culture Results:   No growth at 24 hours  Culture - Urine (05.09.25 @ 17:24)   Specimen Source: Urine None  Culture Results:   >=3 organisms. Probable collection contamination.      Radiology: all available radiological tests reviewed  < from: CT Head No Cont (05.09.25 @ 16:51) >    ACC: [id number] EXAM:  CT BRAIN   ORDERED BY: MARISA PARRA     PROCEDURE DATE:  05/09/2025          INTERPRETATION:  CLINICAL INFORMATION: Fatigue.    COMPARISON: No similar prior studies for comparison.    CONTRAST:  IV Contrast: NONE    TECHNIQUE:  Serial axial images were obtained from the skull base to the   vertex using multi-slice helical technique. Sagittal and coronal   reformats were obtained.    FINDINGS:    VENTRICLES AND SULCI: Age-appropriate atrophy.  INTRA-AXIAL: No evidence of mass effect, acute hemorrhage, or midline   shift.  Compelling evidence of acute territorial infarct. Moderate white   matter hypodensities; a nonspecific finding which statistically reflects   chronic microvascular ischemic change.  EXTRA-AXIAL: No mass or fluid collection. Basal cisterns are normal in   appearance.    VISUALIZED SINUSES:  Partial opacification visualized left maxillary   sinus, with internal hyperdensity-calcification as well as wall   thickening. No air-fluid levels.  TYMPANOMASTOID CAVITIES:  No effusion.  VISUALIZED ORBITS: Grossly unremarkable.  CALVARIUM: Intact.    MISCELLANEOUS: None.    IMPRESSION:  1. No evidence of acute hemorrhage, territorial infarct or mass effect.   Senescent changes as above.  2. Evidence of chronic left maxillary sinusitis with internal   hyperdensity-calcification; the latter of which can be seen in the   setting of inspissated secretions as well as fungal colonization.   Correlate clinically.  3. If clinical symptoms persist consider further imaging with MRI,   provided there are no medical contraindications.        ACC: [id number] EXAM:  XR CHEST PORTABLE IMMED 1V   ORDERED BY: MARISA PARRA     PROCEDURE DATE:  05/09/2025          INTERPRETATION:  TECHNIQUE: A single AP view of the chest was obtained.   Ordered time:   5/9/2025 4:27 PM    COMPARISON: 3/6/2020    CLINICAL INFORMATION: Weakness and fatigue    FINDINGS:    The heart is not well assessed on an AP film.  The lungs are clear.  There are no pleural effusions.  There is no pneumothorax.    IMPRESSION:    No radiographic evidence of acute cardiopulmonary disease      Advanced directives addressed: full resuscitation
History of Present Illness:   88 y/o F w/ PMH of bladder prolapse, urinary retention s/p chronic nuñez, p/w fatigue. Patient states that she has been very fatigued over the last few days. Even walking a short distance she feels very winded. States that has been having decreased appetite over the last month, and has had unintenitonal weight loss of about 20 lbs. She denies any hematochezia / melena / nausea / vomiting / abdominal pain. Patient states that she doesn't have a PCP, and doesn't follow up with any doctors outpatient, except for her urologist. Denies having a colonoscopy in the past.     5.10: denies hematochezia, no cp, no sob  5.11: no hematochezia , no bleeding, s/p 1u RBC        REVIEW OF SYSTEMS:    CONSTITUTIONAL: No weakness, No fevers or chills  ENT: No ear ache, No sorethroat  NECK: No pain, No stiffness  RESPIRATORY: No cough, No wheezing, No hemoptysis; No dyspnea  CARDIOVASCULAR: No chest pain, No palpitations  GASTROINTESTINAL: No abd pain, No nausea, No vomiting, No hematemesis, No diarrhea or constipation. No melena, No hematochezia.  GENITOURINARY: No dysuria, No  hematuria  NEUROLOGICAL: No diplopia, No paresthesia, No motor dysfunction  MUSCULOSKELETAL: No arthralgia, No myalgia  SKIN: No rashes, or lesions   PSYCH: no anxiety, no suicidal ideation    All other review of systems is negative unless indicated above    Vital Signs Last 24 Hrs  T(C): 37.2 (11 May 2025 16:33), Max: 37.2 (11 May 2025 16:33)  T(F): 98.9 (11 May 2025 16:33), Max: 98.9 (11 May 2025 16:33)  HR: 64 (11 May 2025 16:33) (64 - 98)  BP: 102/72 (11 May 2025 16:33) (102/72 - 119/81)  BP(mean): 78 (10 May 2025 22:50) (78 - 78)  RR: 17 (11 May 2025 16:33) (16 - 18)  SpO2: 96% (11 May 2025 16:33) (94% - 97%)    Parameters below as of 11 May 2025 16:33  Patient On (Oxygen Delivery Method): room air        PHYSICAL EXAM:    GENERAL: NAD  HEENT:  NC/AT, EOMI, PERRLA, No scleral icterus, Moist mucous membranes  NECK: Supple, No JVD  CNS:  Alert & Oriented X3, Motor Strength 5/5 B/L upper and lower extremities; DTRs 2+ intact   LUNG: Normal Breath sounds, Clear to auscultation bilaterally, No rales, No rhonchi, No wheezing  HEART: RRR; No murmurs, No rubs  ABDOMEN: +BS, ST/ND/NT  GENITOURINARY: Voiding, Bladder not distended  EXTREMITIES:  2+ Peripheral Pulses, No clubbing, No cyanosis, No tibial edema  MUSCULOSKELTAL: Joints normal ROM, No TTP, No effusion  SKIN: no rashes  RECTAL: deferred, not indicated  BREAST: deferred               Labs:                        7.9    6.52  )-----------( 532      ( 11 May 2025 07:50 )             25.8     05-11    135  |  107  |  25[H]  ----------------------------<  94  4.2   |  22  |  2.29[H]    Ca    9.3      11 May 2025 07:50    TPro  6.8  /  Alb  1.7[L]  /  TBili  0.2  /  DBili  x   /  AST  17  /  ALT  14  /  AlkPhos  99  05-10        MEDICATIONS  (STANDING):  cefTRIAXone Injectable. 1000 milliGRAM(s) IV Push every 24 hours  polyethylene glycol 3350 17 Gram(s) Oral daily  senna 2 Tablet(s) Oral at bedtime    MEDICATIONS  (PRN):  melatonin 5 milliGRAM(s) Oral at bedtime PRN Sleep      all labs reviewed  all imaging reviewed    a/p:      1. Anemia, Iron deficiency   r/o occult blood loss due to GIB  Occult blood feces pending   s/p 1u RBC   GI Evaluation   c/w PPI  IV Venofer     2. Weight loss and severe Protein Calorie Malnutrition:  Ensure   will need GI workup to r/o malignancy   will check CEA    3. Uti and L pyelonephritis:  f/u UCx, Blood Cx  CT Abd noted   c/w Ceftriaxone pending cultures     4. Chronic urinary retention:  c/w Nuñez     5. 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 
History of Present Illness:   88 y/o F w/ PMH of bladder prolapse, urinary retention s/p chronic nuñez, p/w fatigue. Patient states that she has been very fatigued over the last few days. Even walking a short distance she feels very winded. States that has been having decreased appetite over the last month, and has had unintenitonal weight loss of about 20 lbs. She denies any hematochezia / melena / nausea / vomiting / abdominal pain. Patient states that she doesn't have a PCP, and doesn't follow up with any doctors outpatient, except for her urologist. Denies having a colonoscopy in the past.     5.10: denies hematochezia, no cp, no sob  5.11: no hematochezia , no bleeding, s/p 1u RBC  5..12: no pain, no n/v/d, tolerated diet        REVIEW OF SYSTEMS:    CONSTITUTIONAL: No weakness, No fevers or chills  ENT: No ear ache, No sorethroat  NECK: No pain, No stiffness  RESPIRATORY: No cough, No wheezing, No hemoptysis; No dyspnea  CARDIOVASCULAR: No chest pain, No palpitations  GASTROINTESTINAL: No abd pain, No nausea, No vomiting, No hematemesis, No diarrhea or constipation. No melena, No hematochezia.  GENITOURINARY: No dysuria, No  hematuria  NEUROLOGICAL: No diplopia, No paresthesia, No motor dysfunction  MUSCULOSKELETAL: No arthralgia, No myalgia  SKIN: No rashes, or lesions   PSYCH: no anxiety, no suicidal ideation    All other review of systems is negative unless indicated above    Vital Signs Last 24 Hrs  T(C): 36.7 (12 May 2025 15:53), Max: 37 (11 May 2025 23:59)  T(F): 98.1 (12 May 2025 15:53), Max: 98.6 (11 May 2025 23:59)  HR: 87 (12 May 2025 15:53) (72 - 87)  BP: 109/81 (12 May 2025 15:53) (91/51 - 111/62)  BP(mean): 77 (12 May 2025 07:38) (77 - 77)  RR: 18 (12 May 2025 15:53) (15 - 18)  SpO2: 100% (12 May 2025 15:53) (93% - 100%)    Parameters below as of 12 May 2025 15:53  Patient On (Oxygen Delivery Method): room air        PHYSICAL EXAM:    GENERAL: NAD, cachectic   HEENT:  NC/AT, EOMI, PERRLA, No scleral icterus, Moist mucous membranes, +bitemp wasting   NECK: Supple, No JVD  CNS:  Alert & Oriented X3, Motor Strength 5/5 B/L upper and lower extremities; DTRs 2+ intact   LUNG: Normal Breath sounds, Clear to auscultation bilaterally, No rales, No rhonchi, No wheezing  HEART: RRR; No murmurs, No rubs  ABDOMEN: +BS, ST/ND/NT  GENITOURINARY: Voiding, Bladder not distended  EXTREMITIES:  2+ Peripheral Pulses, No clubbing, No cyanosis, No tibial edema  MUSCULOSKELTAL: Joints normal ROM, No TTP, No effusion  SKIN: no rashes  RECTAL: deferred, not indicated  BREAST: deferred               Labs:                        7.9    6.52  )-----------( 532      ( 11 May 2025 07:50 )             25.8     05-11    135  |  107  |  25[H]  ----------------------------<  94  4.2   |  22  |  2.29[H]    Ca    9.3      11 May 2025 07:50    TPro  6.8  /  Alb  1.7[L]  /  TBili  0.2  /  DBili  x   /  AST  17  /  ALT  14  /  AlkPhos  99  05-10        MEDICATIONS  (STANDING):  cefTRIAXone Injectable. 1000 milliGRAM(s) IV Push every 24 hours  polyethylene glycol 3350 17 Gram(s) Oral daily  senna 2 Tablet(s) Oral at bedtime    MEDICATIONS  (PRN):  melatonin 5 milliGRAM(s) Oral at bedtime PRN Sleep      all labs reviewed  all imaging reviewed    a/p:      1. Anemia, Iron deficiency   r/o occult blood loss due to GIB  Occult blood feces pending   s/p 1u RBC   GI Evaluation   c/w PPI  IV Venofer     2. Weight loss and severe Protein Calorie Malnutrition:  Ensure   will need GI workup to r/o malignancy   will check CEA    3. Uti and L pyelonephritis:  f/u UCx, Blood Cx  CT Abd noted   s/p Ceftriaxone; will c/w Ceftin per ID    4. Chronic urinary retention:  c/w Nuñez     5. 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 Anxiety:  Benzo prn 
  CC: [id number] EXAM:  MR PELVIS WAW IC   ORDERED BY: RAFA VERMA     ACC: [id number] EXAM:  MR ABDOMEN WAW IC   ORDERED BY: RAFA VERMA     PROCEDURE DATE:  05/13/2025          INTERPRETATION:  CLINICAL INFORMATION: Left hydronephrosis on CT scan    COMPARISON: CT abdomen/pelvis May 10, 2025, March 06, 2020, CT chest   September 22, 2017    CONTRAST/COMPLICATIONS:  IV Contrast: Gadavist (accession [id number]), IV contrast documented in   unlinked concurrent exam (accession [id number])  5 cc administered   2.5 cc   discarded  Oral Contrast: NONE  .    PROCEDURE:  MRI of the abdomen and pelvis was performed as per MR Urogram protocol.  10 mg Lasix administered IV.    FINDINGS:  LOWER CHEST: Within normal limits.    LIVER: Scattered cysts. Indeterminate 12 mm hypoenhancing lesion in   segment 8 (42; 33).  BILE DUCTS: Normal caliber.  GALLBLADDER: Numerous gallstones.  SPLEEN: Within normal limits.  PANCREAS: Within normal limits.  ADRENALS: Within normal limits.  KIDNEYS/URETERS: Moderately atrophic and scarred right kidney with no   mass or hydronephrosis. Severely atrophic left kidney with almost   complete loss of cortex. Severe left hydroureteronephrosis with the   dilated ureter traced to the ureterovesical junction. The   hydroureteronephrosis is new since 2017, however not significantly   changed compared to 2020. There is diffuse wall thickening of the left   renal collecting system and ureter with narrowing of the ureteral lumen,   proximally, near the UPJ and along a long segment of distal ureter.   Heterogeneous contents within the collecting system and ureter could be   due to volume versus exudative material.4.7 cm lenticular cystic lesion   along the anterior surface of the left kidney. No mass or filling defect   seen in the right renal collecting system and ureter.    BLADDER: Rosario catheter balloon in place. No bladder mass or wall   thickening.  REPRODUCTIVE ORGANS: Thickened endometrium measuring 1.6 cm with   heterogeneous enhancing contents.    BOWEL: No bowel obstruction. Appendix not visualized. Colonic   diverticulosis.  PERITONEUM: No ascites.  VESSELS: Mildly ectatic aorta measuring up to 2.5 cm in diameter.  RETROPERITONEUM/LYMPH NODES: No lymphadenopathy.  ABDOMINAL WALL: Within normal limits.  BONES: Right hip arthroplasty.    IMPRESSION:  Severe chronic left hydroureteronephrosis. Diffuse mural thickening of   the renal collecting system and ureter, most likely   infectious/inflammatory, however neoplasm is not excluded, especially in   the distal left ureter which is the most thickened segment of ureter.    Heterogeneously thickened and enhancing endometrium. Recommend ultrasound   to better evaluate as neoplasm is in the differential diagnosis.  
  Date of service: 05-14-25 @ 14:34    pt seen and examined  afebrile; laying in bed, nad  no o/n events     ROS: no fever or chills; denies dizziness, no HA, no SOB or cough, no abdominal pain, no diarrhea or constipation;  no legs pain, no rashes      MEDICATIONS  (STANDING):  cefuroxime   Tablet 250 milliGRAM(s) Oral every 12 hours  pantoprazole    Tablet 40 milliGRAM(s) Oral before breakfast  polyethylene glycol 3350 17 Gram(s) Oral daily  senna 2 Tablet(s) Oral at bedtime    Vital Signs Last 24 Hrs  T(C): 36.5 (14 May 2025 07:52), Max: 36.7 (13 May 2025 15:04)  T(F): 97.7 (14 May 2025 07:52), Max: 98.1 (13 May 2025 15:04)  HR: 72 (14 May 2025 07:52) (71 - 76)  BP: 95/63 (14 May 2025 07:52) (92/55 - 110/55)  BP(mean): 71 (14 May 2025 07:52) (71 - 74)  RR: 16 (14 May 2025 07:52) (16 - 17)  SpO2: 99% (14 May 2025 07:52) (98% - 100%)    Parameters below as of 14 May 2025 07:52  Patient On (Oxygen Delivery Method): room air      PE:  Constitutional: NAD  HEENT: NC/AT, EOMI, PERRLA, conjunctivae clear; ears and nose atraumatic; pharynx benign  Neck: supple; thyroid not palpable  Back: no tenderness  Respiratory: respiratory effort normal; clear to auscultation  Cardiovascular: S1S2 regular, no murmurs  Abdomen: soft, not tender, not distended, positive BS; liver and spleen WNL  Genitourinary: no suprapubic tenderness  Lymphatic: no LN palpable  Musculoskeletal: no muscle tenderness, no joint swelling or tenderness  Extremities: no pedal edema  Neurological/ Psychiatric: AxOx3, Judgement and insight normal;  moving all extremities  Skin: no rashes; no palpable lesions    Labs: all available labs reviewed                          8.4    6.03  )-----------( 584      ( 12 May 2025 11:00 )             27.7     05-12    136  |  107  |  23  ----------------------------<  111[H]  4.6   |  24  |  2.34[H]    Ca    9.2      12 May 2025 11:00           Culture - Blood (05.09.25 @ 19:04)   Specimen Source: Blood None  Culture Results:   No growth at 24 hours  Culture - Blood (05.09.25 @ 19:02)   Specimen Source: Blood None  Culture Results:   No growth at 24 hours  Culture - Urine (05.09.25 @ 17:24)   Specimen Source: Urine None  Culture Results:   >=3 organisms. Probable collection contamination.      Radiology: all available radiological tests reviewed  < from: MR Pelvis w/wo IV Cont (05.13.25 @ 16:23) >  IMPRESSION:  Severe chronic left hydroureteronephrosis. Diffuse mural thickening of   the renal collecting system and ureter, most likely   infectious/inflammatory, however neoplasm is not excluded, especially in   the distal left ureter which is the most thickened segment of ureter.    Heterogeneously thickened and enhancing endometrium. Recommend ultrasound   to better evaluate as neoplasm is in the differential diagnosis.    < end of copied text >    < from: CT Head No Cont (05.09.25 @ 16:51) >    ACC: [id number] EXAM:  CT BRAIN   ORDERED BY: MARISA PARRA     PROCEDURE DATE:  05/09/2025          INTERPRETATION:  CLINICAL INFORMATION: Fatigue.    COMPARISON: No similar prior studies for comparison.    CONTRAST:  IV Contrast: NONE    TECHNIQUE:  Serial axial images were obtained from the skull base to the   vertex using multi-slice helical technique. Sagittal and coronal   reformats were obtained.    FINDINGS:    VENTRICLES AND SULCI: Age-appropriate atrophy.  INTRA-AXIAL: No evidence of mass effect, acute hemorrhage, or midline   shift.  Compelling evidence of acute territorial infarct. Moderate white   matter hypodensities; a nonspecific finding which statistically reflects   chronic microvascular ischemic change.  EXTRA-AXIAL: No mass or fluid collection. Basal cisterns are normal in   appearance.    VISUALIZED SINUSES:  Partial opacification visualized left maxillary   sinus, with internal hyperdensity-calcification as well as wall   thickening. No air-fluid levels.  TYMPANOMASTOID CAVITIES:  No effusion.  VISUALIZED ORBITS: Grossly unremarkable.  CALVARIUM: Intact.    MISCELLANEOUS: None.    IMPRESSION:  1. No evidence of acute hemorrhage, territorial infarct or mass effect.   Senescent changes as above.  2. Evidence of chronic left maxillary sinusitis with internal   hyperdensity-calcification; the latter of which can be seen in the   setting of inspissated secretions as well as fungal colonization.   Correlate clinically.  3. If clinical symptoms persist consider further imaging with MRI,   provided there are no medical contraindications.        ACC: [id number] EXAM:  XR CHEST PORTABLE IMMED 1V   ORDERED BY: MARISA PARRA     PROCEDURE DATE:  05/09/2025          INTERPRETATION:  TECHNIQUE: A single AP view of the chest was obtained.   Ordered time:   5/9/2025 4:27 PM    COMPARISON: 3/6/2020    CLINICAL INFORMATION: Weakness and fatigue    FINDINGS:    The heart is not well assessed on an AP film.  The lungs are clear.  There are no pleural effusions.  There is no pneumothorax.    IMPRESSION:    No radiographic evidence of acute cardiopulmonary disease      Advanced directives addressed: full resuscitation
Date of service: 05-11-25 @ 15:08    pt seen and examined  feels better  afebrile  laying in bed, nad    ROS: no fever or chills; denies dizziness, no HA, no SOB or cough, no abdominal pain, no diarrhea or constipation;  no legs pain, no rashes    MEDICATIONS  (STANDING):  cefTRIAXone Injectable. 1000 milliGRAM(s) IV Push every 24 hours  pantoprazole    Tablet 40 milliGRAM(s) Oral before breakfast  polyethylene glycol 3350 17 Gram(s) Oral daily  senna 2 Tablet(s) Oral at bedtime    Vital Signs Last 24 Hrs  T(C): 36.5 (11 May 2025 08:34), Max: 37 (10 May 2025 16:50)  T(F): 97.7 (11 May 2025 08:34), Max: 98.6 (10 May 2025 16:50)  HR: 98 (11 May 2025 08:34) (72 - 98)  BP: 119/81 (11 May 2025 08:34) (92/48 - 119/81)  BP(mean): 78 (10 May 2025 22:50) (78 - 78)  RR: 16 (11 May 2025 08:34) (16 - 18)  SpO2: 94% (11 May 2025 08:34) (94% - 98%)    Parameters below as of 11 May 2025 08:34  Patient On (Oxygen Delivery Method): room air              PE:  Constitutional: NAD  HEENT: NC/AT, EOMI, PERRLA, conjunctivae clear; ears and nose atraumatic; pharynx benign  Neck: supple; thyroid not palpable  Back: no tenderness  Respiratory: respiratory effort normal; clear to auscultation  Cardiovascular: S1S2 regular, no murmurs  Abdomen: soft, not tender, not distended, positive BS; liver and spleen WNL  Genitourinary: no suprapubic tenderness  Lymphatic: no LN palpable  Musculoskeletal: no muscle tenderness, no joint swelling or tenderness  Extremities: no pedal edema  Neurological/ Psychiatric: AxOx3, Judgement and insight normal;  moving all extremities  Skin: no rashes; no palpable lesions    Labs: all available labs reviewed                        7.9    6.52  )-----------( 532      ( 11 May 2025 07:50 )             25.8     05-11    135  |  107  |  25[H]  ----------------------------<  94  4.2   |  22  |  2.29[H]    Ca    9.3      11 May 2025 07:50  Mg     2.3     05-09    TPro  6.8  /  Alb  1.7[L]  /  TBili  0.2  /  DBili  x   /  AST  17  /  ALT  14  /  AlkPhos  99  05-10         Culture - Blood (05.09.25 @ 19:04)   Specimen Source: Blood None  Culture Results:   No growth at 24 hours  Culture - Blood (05.09.25 @ 19:02)   Specimen Source: Blood None  Culture Results:   No growth at 24 hours  Culture - Urine (05.09.25 @ 17:24)   Specimen Source: Urine None  Culture Results:   >=3 organisms. Probable collection contamination.      Radiology: all available radiological tests reviewed  < from: CT Head No Cont (05.09.25 @ 16:51) >    ACC: [id number] EXAM:  CT BRAIN   ORDERED BY: MARISA PARRA     PROCEDURE DATE:  05/09/2025          INTERPRETATION:  CLINICAL INFORMATION: Fatigue.    COMPARISON: No similar prior studies for comparison.    CONTRAST:  IV Contrast: NONE    TECHNIQUE:  Serial axial images were obtained from the skull base to the   vertex using multi-slice helical technique. Sagittal and coronal   reformats were obtained.    FINDINGS:    VENTRICLES AND SULCI: Age-appropriate atrophy.  INTRA-AXIAL: No evidence of mass effect, acute hemorrhage, or midline   shift.  Compelling evidence of acute territorial infarct. Moderate white   matter hypodensities; a nonspecific finding which statistically reflects   chronic microvascular ischemic change.  EXTRA-AXIAL: No mass or fluid collection. Basal cisterns are normal in   appearance.    VISUALIZED SINUSES:  Partial opacification visualized left maxillary   sinus, with internal hyperdensity-calcification as well as wall   thickening. No air-fluid levels.  TYMPANOMASTOID CAVITIES:  No effusion.  VISUALIZED ORBITS: Grossly unremarkable.  CALVARIUM: Intact.    MISCELLANEOUS: None.    IMPRESSION:  1. No evidence of acute hemorrhage, territorial infarct or mass effect.   Senescent changes as above.  2. Evidence of chronic left maxillary sinusitis with internal   hyperdensity-calcification; the latter of which can be seen in the   setting of inspissated secretions as well as fungal colonization.   Correlate clinically.  3. If clinical symptoms persist consider further imaging with MRI,   provided there are no medical contraindications.        ACC: [id number] EXAM:  XR CHEST PORTABLE IMMED 1V   ORDERED BY: MARISA PARRA     PROCEDURE DATE:  05/09/2025          INTERPRETATION:  TECHNIQUE: A single AP view of the chest was obtained.   Ordered time:   5/9/2025 4:27 PM    COMPARISON: 3/6/2020    CLINICAL INFORMATION: Weakness and fatigue    FINDINGS:    The heart is not well assessed on an AP film.  The lungs are clear.  There are no pleural effusions.  There is no pneumothorax.    IMPRESSION:    No radiographic evidence of acute cardiopulmonary disease      Advanced directives addressed: full resuscitation
Patient seen at bedside with son at bedside. Pt is doing well, denies any abd/flank pain. Reports has chronic nuñez for bladder prolapse.    PE  VITALS  T(C): 36.7 (05-12-25 @ 07:38), Max: 37.2 (05-11-25 @ 16:33)  HR: 75 (05-12-25 @ 07:38) (64 - 75)  BP: 93/70 (05-12-25 @ 07:38) (91/51 - 111/62)  RR: 15 (05-12-25 @ 07:38) (15 - 18)  SpO2: 93% (05-12-25 @ 07:38) (93% - 97%)            Skin     : No jaundice   Lung    : No resp distress  Abdo:   : Soft, Non tender, No guarding, No distension   Back    : No CVAT b/l  Genitalia Female: Nuñez with yellow urine    LABS                        8.4    6.03  )-----------( 584      ( 12 May 2025 11:00 )             27.7   05-12    136  |  107  |  23  ----------------------------<  111[H]  4.6   |  24  |  2.34[H]    Ca    9.2      12 May 2025 11:00      
Patient seen at bedside. Pt is doing well sitting up, denies any abd/flank pain.      PE  Vital Signs Last 24 Hrs  T(C): 36.6 (13 May 2025 07:48), Max: 36.7 (12 May 2025 15:53)  T(F): 97.8 (13 May 2025 07:48), Max: 98.1 (12 May 2025 15:53)  HR: 75 (13 May 2025 07:48) (75 - 87)  BP: 106/60 (13 May 2025 07:48) (106/60 - 111/55)  BP(mean): 68 (13 May 2025 07:48) (68 - 68)  RR: 15 (13 May 2025 07:48) (15 - 18)  SpO2: 100% (13 May 2025 07:48) (93% - 100%)    Parameters below as of 13 May 2025 07:48  Patient On (Oxygen Delivery Method): room air                Skin     : No jaundice   Lung    : No resp distress  Abdo:   : Soft, Non tender, No guarding, No distension   Back    : No CVAT b/l  Genitalia Female: Rosario with yellow urine    LABS                             8.4    6.03  )-----------( 584      ( 12 May 2025 11:00 )             27.7   05-12    136  |  107  |  23  ----------------------------<  111[H]  4.6   |  24  |  2.34[H]    Ca    9.2      12 May 2025 11:00

## 2025-05-14 NOTE — DISCHARGE NOTE NURSING/CASE MANAGEMENT/SOCIAL WORK - PATIENT PORTAL LINK FT
You can access the FollowMyHealth Patient Portal offered by [facility] by registering at the following website: [email or web address]. By joining [employer]s FollowMyHealth portal, you will also be able to view your health information using other applications (apps) compatible with our system.

## 2025-05-14 NOTE — PROGRESS NOTE ADULT - ASSESSMENT
89 y/o F w/ PMH of bladder prolapse, urinary retention s/p chronic nuñez, p/w fatigue. Patient states that she has been very fatigued over the last few days. Even walking a short distance she feels very winded. States that has been having decreased appetite over the last month, and has had unintentional weight loss of about 20 lbs. She denies any hematochezia / melena / nausea / vomiting / abdominal pain. Patient states that she doesn't have a PCP, and doesn't follow up with any doctors outpatient, except for her urologist. Denies having a colonoscopy in the past. Noted with pyuria, ua positive concern for UTI, started on IV abx.     Pyuria. UTI. Urinary retention with chronic nuñez. Bladder prolapse. FRANCIS  - ct head findings noted, no sinusitis sxs - do not treat  - s/p rocephin 1gm daily #4  - now on po ceftin 250mg BID complete 10 day course  - f/u urine cx  contaminated, blood cx no growth   - continue with abx coverage  - monitor temps  - tolerating abx well so far; no side effects noted  - reason for abx use and side effects reviewed with patient  - supportive care  - fu cbc  - urology eval noted f/u MRI urogram - reviewed     Concern for infection with multi-resistant organisms was raised. Prior cultures reviewed. An epidemiologic assessment was performed. There is a significant risk for resistant microorganisms to spread to family members, and/or healthcare staff. The patient will be placed on isolation according to infection control policy. Will reconsider isolation measures based on new culture results and other clinical data as appropriate. Appropriate cultures collected and an appropriate broad spectrum antibiotic therapy will be considered.      Clinical team may change from intravenous to oral antibiotics when the following criteria are met:   1. Patient is clinically improving/stable       a)	Improved signs and symptoms of infection from initial presentation       b)	Afebrile for 24 hours       c)	Leukocytosis trending towards normal range   2. Patient is tolerating oral intake   3. Initial/repeat blood cultures are negative     - on dc po ceftin 250mg BID complete total 10 days

## 2025-05-14 NOTE — DISCHARGE NOTE PROVIDER - NSDCHHCONTACT_GEN_ALL_CORE_FT
As certified below, I, or a nurse practitioner or physician assistant working with me, had a face-to-face encounter that meets the physician face-to-face encounter requirements.
Left arm;

## 2025-05-14 NOTE — DISCHARGE NOTE PROVIDER - HOSPITAL COURSE
History of Present Illness:   88 y/o F w/ PMH of bladder prolapse, urinary retention s/p chronic nuñez, p/w fatigue. Patient states that she has been very fatigued over the last few days. Even walking a short distance she feels very winded. States that has been having decreased appetite over the last month, and has had unintenitonal weight loss of about 20 lbs. She denies any hematochezia / melena / nausea / vomiting / abdominal pain. Patient states that she doesn't have a PCP, and doesn't follow up with any doctors outpatient, except for her urologist. Denies having a colonoscopy in the past.     5.10: denies hematochezia, no cp, no sob  5.11: no hematochezia , no bleeding, s/p 1u RBC  5..12: no pain, no n/v/d, tolerated diet  5.13: feels well, no pain, no melena, no hematochezia   5.14: feels well and wants to go home, no distress    All other review of systems is negative unless indicated above    Vital Signs Last 24 Hrs  T(C): 36.5 (14 May 2025 07:52), Max: 36.7 (13 May 2025 15:04)  T(F): 97.7 (14 May 2025 07:52), Max: 98.1 (13 May 2025 15:04)  HR: 72 (14 May 2025 07:52) (71 - 76)  BP: 95/63 (14 May 2025 07:52) (92/55 - 110/55)  BP(mean): 71 (14 May 2025 07:52) (71 - 74)  RR: 16 (14 May 2025 07:52) (16 - 17)  SpO2: 99% (14 May 2025 07:52) (98% - 100%)    Parameters below as of 14 May 2025 07:52  Patient On (Oxygen Delivery Method): room air              PHYSICAL EXAM:    GENERAL: NAD, cachectic   HEENT:  NC/AT, EOMI, PERRLA, No scleral icterus, Moist mucous membranes, +bitemp wasting   NECK: Supple, No JVD  CNS:  Alert & Oriented X3, Motor Strength 5/5 B/L upper and lower extremities; DTRs 2+ intact   LUNG: Normal Breath sounds, Clear to auscultation bilaterally, No rales, No rhonchi, No wheezing  HEART: RRR; No murmurs, No rubs  ABDOMEN: +BS, ST/ND/NT  GENITOURINARY: Voiding, Bladder not distended  EXTREMITIES:  2+ Peripheral Pulses, No clubbing, No cyanosis, No tibial edema  MUSCULOSKELTAL: Joints normal ROM, No TTP, No effusion  SKIN: no rashes  RECTAL: deferred, not indicated  BREAST: deferred    a/p:      1. Anemia, Iron deficiency   s/p 1u RBC   no obvious bleeding   GI Evaluation for endoscopy as outpatient  c/w PPI  Feosol     2. Weight loss and severe Protein Calorie Malnutrition:  Ensure   Reg diet    3. Uti and L pyelonephritis:  f/u UCx, Blood Cx:ngtd   CT Abd and MRI noted: favors infection; L hydronephrosis not changed since 2020  no procedures planned by urology in house   s/p Ceftriaxone; will c/w Ceftin x 2 weeks     4. Chronic urinary retention:  c/w Nuñez     5. 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 Anxiety:  Benzo prn

## 2025-05-15 LAB
CULTURE RESULTS: SIGNIFICANT CHANGE UP
CULTURE RESULTS: SIGNIFICANT CHANGE UP
SPECIMEN SOURCE: SIGNIFICANT CHANGE UP
SPECIMEN SOURCE: SIGNIFICANT CHANGE UP

## 2025-05-27 NOTE — CDI QUERY NOTE - NSCDIOTHERTXTBX_GEN_ALL_CORE_HH
Clinical documentation and/or evidence of the patient’s presentation, evaluation, and medical management, as evidenced below, may support a cause and effect link that is not documented in the medical record.  In order to ensure accurate coding and accuracy of the clinical record, the documentation in this patient’s medical record requires additional clarification.      If you think the supporting documentation and/or clinical evidence supports a cause and effect link, please include more specific documentation associated with these findings in your progress note and/or discharge summary.      Please clarify if there is a relationship between the UTI  and chronic nuñez, such as:  •	UTI  secondary to the chronic nuñez , present on admission.  •	UTI  unrelated to  chronic nuñez (specify diagnosis or procedure)   •	Other (specify)      Supporting documentation and/or clinical evidence:       Discharge note on 5/14/2025:  88 y/o F w/ PMH of bladder prolapse, urinary retention s/p chronic nuñez, p/w fatigue    3. Uti and L pyelonephritis:  f/u UCx, Blood Cx:ngtd   CT Abd and MRI noted: favors infection; L hydronephrosis not changed since 2020  no procedures planned by urology in house   s/p Ceftriaxone; will c/w Ceftin x 2 weeks     4. Chronic urinary retention:  c/w Nuñez       PRINCIPAL DISCHARGE DIAGNOSIS  Diagnosis: Pyelonephritis  Assessment and Plan of Treatment: complete Abx at home

## 2025-05-28 ENCOUNTER — NON-APPOINTMENT (OUTPATIENT)
Age: 89
End: 2025-05-28

## 2025-06-05 ENCOUNTER — APPOINTMENT (OUTPATIENT)
Dept: UROLOGY | Facility: CLINIC | Age: 89
End: 2025-06-05
Payer: MEDICARE

## 2025-06-05 DIAGNOSIS — R33.9 RETENTION OF URINE, UNSPECIFIED: ICD-10-CM

## 2025-06-05 PROCEDURE — 51702 INSERT TEMP BLADDER CATH: CPT

## 2025-07-10 ENCOUNTER — APPOINTMENT (OUTPATIENT)
Dept: UROLOGY | Facility: CLINIC | Age: 89
End: 2025-07-10
Payer: MEDICARE

## 2025-07-10 PROCEDURE — 51702 INSERT TEMP BLADDER CATH: CPT

## 2025-07-23 ENCOUNTER — INPATIENT (INPATIENT)
Facility: HOSPITAL | Age: 89
LOS: 5 days | Discharge: HOME HEALTH | DRG: 187 | End: 2025-07-29
Attending: SURGERY | Admitting: SURGERY
Payer: MEDICARE

## 2025-07-23 VITALS
OXYGEN SATURATION: 98 % | WEIGHT: 95.02 LBS | DIASTOLIC BLOOD PRESSURE: 80 MMHG | SYSTOLIC BLOOD PRESSURE: 110 MMHG | HEART RATE: 93 BPM | RESPIRATION RATE: 18 BRPM | TEMPERATURE: 98 F

## 2025-07-23 LAB
ADD ON TEST-SPECIMEN IN LAB: SIGNIFICANT CHANGE UP
ALBUMIN SERPL ELPH-MCNC: 2.5 G/DL — LOW (ref 3.3–5)
ALP SERPL-CCNC: 86 U/L — SIGNIFICANT CHANGE UP (ref 40–120)
ALT FLD-CCNC: 9 U/L — LOW (ref 12–78)
ANION GAP SERPL CALC-SCNC: 6 MMOL/L — SIGNIFICANT CHANGE UP (ref 5–17)
APTT BLD: 22.6 SEC — LOW (ref 26.1–36.8)
AST SERPL-CCNC: 16 U/L — SIGNIFICANT CHANGE UP (ref 15–37)
BASOPHILS # BLD AUTO: 0.04 K/UL — SIGNIFICANT CHANGE UP (ref 0–0.2)
BASOPHILS NFR BLD AUTO: 0.4 % — SIGNIFICANT CHANGE UP (ref 0–2)
BILIRUB SERPL-MCNC: 0.3 MG/DL — SIGNIFICANT CHANGE UP (ref 0.2–1.2)
BUN SERPL-MCNC: 31 MG/DL — HIGH (ref 7–23)
CALCIUM SERPL-MCNC: 9.5 MG/DL — SIGNIFICANT CHANGE UP (ref 8.5–10.1)
CHLORIDE SERPL-SCNC: 105 MMOL/L — SIGNIFICANT CHANGE UP (ref 96–108)
CO2 SERPL-SCNC: 21 MMOL/L — LOW (ref 22–31)
CREAT SERPL-MCNC: 2.36 MG/DL — HIGH (ref 0.5–1.3)
EGFR: 19 ML/MIN/1.73M2 — LOW
EGFR: 19 ML/MIN/1.73M2 — LOW
EOSINOPHIL # BLD AUTO: 0.09 K/UL — SIGNIFICANT CHANGE UP (ref 0–0.5)
EOSINOPHIL NFR BLD AUTO: 0.8 % — SIGNIFICANT CHANGE UP (ref 0–6)
ETHANOL SERPL-MCNC: <10 MG/DL — SIGNIFICANT CHANGE UP (ref 0–10)
GLUCOSE SERPL-MCNC: 122 MG/DL — HIGH (ref 70–99)
HCT VFR BLD CALC: 31.7 % — LOW (ref 34.5–45)
HGB BLD-MCNC: 9.9 G/DL — LOW (ref 11.5–15.5)
IMM GRANULOCYTES # BLD AUTO: 0.1 K/UL — HIGH (ref 0–0.07)
IMM GRANULOCYTES NFR BLD AUTO: 0.9 % — SIGNIFICANT CHANGE UP (ref 0–0.9)
INR BLD: 1.04 RATIO — SIGNIFICANT CHANGE UP (ref 0.85–1.16)
LYMPHOCYTES # BLD AUTO: 0.6 K/UL — LOW (ref 1–3.3)
LYMPHOCYTES NFR BLD AUTO: 5.4 % — LOW (ref 13–44)
MCHC RBC-ENTMCNC: 26.8 PG — LOW (ref 27–34)
MCHC RBC-ENTMCNC: 31.2 G/DL — LOW (ref 32–36)
MCV RBC AUTO: 85.9 FL — SIGNIFICANT CHANGE UP (ref 80–100)
MONOCYTES # BLD AUTO: 0.57 K/UL — SIGNIFICANT CHANGE UP (ref 0–0.9)
MONOCYTES NFR BLD AUTO: 5.1 % — SIGNIFICANT CHANGE UP (ref 2–14)
NEUTROPHILS # BLD AUTO: 9.71 K/UL — HIGH (ref 1.8–7.4)
NEUTROPHILS NFR BLD AUTO: 87.4 % — HIGH (ref 43–77)
NRBC # BLD AUTO: 0 K/UL — SIGNIFICANT CHANGE UP (ref 0–0)
NRBC # FLD: 0 K/UL — SIGNIFICANT CHANGE UP (ref 0–0)
NRBC BLD AUTO-RTO: 0 /100 WBCS — SIGNIFICANT CHANGE UP (ref 0–0)
PLATELET # BLD AUTO: 367 K/UL — SIGNIFICANT CHANGE UP (ref 150–400)
PMV BLD: 8.4 FL — SIGNIFICANT CHANGE UP (ref 7–13)
POTASSIUM SERPL-MCNC: 5.1 MMOL/L — SIGNIFICANT CHANGE UP (ref 3.5–5.3)
POTASSIUM SERPL-SCNC: 5.1 MMOL/L — SIGNIFICANT CHANGE UP (ref 3.5–5.3)
PROT SERPL-MCNC: 7.8 GM/DL — SIGNIFICANT CHANGE UP (ref 6–8.3)
PROTHROM AB SERPL-ACNC: 12 SEC — SIGNIFICANT CHANGE UP (ref 9.9–13.4)
RBC # BLD: 3.69 M/UL — LOW (ref 3.8–5.2)
RBC # FLD: 15.6 % — HIGH (ref 10.3–14.5)
SODIUM SERPL-SCNC: 132 MMOL/L — LOW (ref 135–145)
WBC # BLD: 11.11 K/UL — HIGH (ref 3.8–10.5)
WBC # FLD AUTO: 11.11 K/UL — HIGH (ref 3.8–10.5)

## 2025-07-23 PROCEDURE — 99285 EMERGENCY DEPT VISIT HI MDM: CPT

## 2025-07-23 PROCEDURE — 71250 CT THORAX DX C-: CPT | Mod: 26

## 2025-07-23 PROCEDURE — 74176 CT ABD & PELVIS W/O CONTRAST: CPT | Mod: 26

## 2025-07-23 PROCEDURE — 93010 ELECTROCARDIOGRAM REPORT: CPT

## 2025-07-23 PROCEDURE — 72125 CT NECK SPINE W/O DYE: CPT | Mod: 26

## 2025-07-23 PROCEDURE — 70450 CT HEAD/BRAIN W/O DYE: CPT | Mod: 26

## 2025-07-24 ENCOUNTER — RESULT REVIEW (OUTPATIENT)
Age: 89
End: 2025-07-24

## 2025-07-24 DIAGNOSIS — R79.89 OTHER SPECIFIED ABNORMAL FINDINGS OF BLOOD CHEMISTRY: ICD-10-CM

## 2025-07-24 DIAGNOSIS — I82.409 ACUTE EMBOLISM AND THROMBOSIS OF UNSPECIFIED DEEP VEINS OF UNSPECIFIED LOWER EXTREMITY: ICD-10-CM

## 2025-07-24 DIAGNOSIS — S22.39XA FRACTURE OF ONE RIB, UNSPECIFIED SIDE, INITIAL ENCOUNTER FOR CLOSED FRACTURE: ICD-10-CM

## 2025-07-24 DIAGNOSIS — N18.9 CHRONIC KIDNEY DISEASE, UNSPECIFIED: ICD-10-CM

## 2025-07-24 LAB
LACTATE SERPL-SCNC: 0.7 MMOL/L — SIGNIFICANT CHANGE UP (ref 0.7–2)
NT-PROBNP SERPL-SCNC: 598 PG/ML — HIGH (ref 0–450)
TROPONIN I, HIGH SENSITIVITY RESULT: 4.56 NG/L — SIGNIFICANT CHANGE UP

## 2025-07-24 PROCEDURE — 87186 SC STD MICRODIL/AGAR DIL: CPT

## 2025-07-24 PROCEDURE — C1894: CPT

## 2025-07-24 PROCEDURE — 85027 COMPLETE CBC AUTOMATED: CPT

## 2025-07-24 PROCEDURE — 99232 SBSQ HOSP IP/OBS MODERATE 35: CPT | Mod: FS

## 2025-07-24 PROCEDURE — 97162 PT EVAL MOD COMPLEX 30 MIN: CPT | Mod: GP

## 2025-07-24 PROCEDURE — 83735 ASSAY OF MAGNESIUM: CPT

## 2025-07-24 PROCEDURE — 93356 MYOCRD STRAIN IMG SPCKL TRCK: CPT

## 2025-07-24 PROCEDURE — 76376 3D RENDER W/INTRP POSTPROCES: CPT | Mod: 26

## 2025-07-24 PROCEDURE — 82247 BILIRUBIN TOTAL: CPT

## 2025-07-24 PROCEDURE — 36430 TRANSFUSION BLD/BLD COMPNT: CPT

## 2025-07-24 PROCEDURE — 83615 LACTATE (LD) (LDH) ENZYME: CPT

## 2025-07-24 PROCEDURE — 86923 COMPATIBILITY TEST ELECTRIC: CPT

## 2025-07-24 PROCEDURE — 80048 BASIC METABOLIC PNL TOTAL CA: CPT

## 2025-07-24 PROCEDURE — 82607 VITAMIN B-12: CPT

## 2025-07-24 PROCEDURE — 87077 CULTURE AEROBIC IDENTIFY: CPT

## 2025-07-24 PROCEDURE — 93306 TTE W/DOPPLER COMPLETE: CPT | Mod: 26

## 2025-07-24 PROCEDURE — C1880: CPT

## 2025-07-24 PROCEDURE — 84100 ASSAY OF PHOSPHORUS: CPT

## 2025-07-24 PROCEDURE — C1769: CPT

## 2025-07-24 PROCEDURE — C1887: CPT

## 2025-07-24 PROCEDURE — 82668 ASSAY OF ERYTHROPOIETIN: CPT

## 2025-07-24 PROCEDURE — 82962 GLUCOSE BLOOD TEST: CPT

## 2025-07-24 PROCEDURE — 86901 BLOOD TYPING SEROLOGIC RH(D): CPT

## 2025-07-24 PROCEDURE — 97530 THERAPEUTIC ACTIVITIES: CPT | Mod: GP

## 2025-07-24 PROCEDURE — 86850 RBC ANTIBODY SCREEN: CPT

## 2025-07-24 PROCEDURE — 36415 COLL VENOUS BLD VENIPUNCTURE: CPT

## 2025-07-24 PROCEDURE — 86900 BLOOD TYPING SEROLOGIC ABO: CPT

## 2025-07-24 PROCEDURE — 93971 EXTREMITY STUDY: CPT | Mod: 26,RT

## 2025-07-24 PROCEDURE — 87086 URINE CULTURE/COLONY COUNT: CPT

## 2025-07-24 PROCEDURE — P9016: CPT

## 2025-07-24 PROCEDURE — 83010 ASSAY OF HAPTOGLOBIN QUANT: CPT

## 2025-07-24 PROCEDURE — 85730 THROMBOPLASTIN TIME PARTIAL: CPT

## 2025-07-24 PROCEDURE — 82728 ASSAY OF FERRITIN: CPT

## 2025-07-24 PROCEDURE — 71045 X-RAY EXAM CHEST 1 VIEW: CPT

## 2025-07-24 PROCEDURE — 82248 BILIRUBIN DIRECT: CPT

## 2025-07-24 PROCEDURE — 83540 ASSAY OF IRON: CPT

## 2025-07-24 PROCEDURE — 83550 IRON BINDING TEST: CPT

## 2025-07-24 PROCEDURE — 85384 FIBRINOGEN ACTIVITY: CPT

## 2025-07-24 PROCEDURE — 93971 EXTREMITY STUDY: CPT | Mod: RT

## 2025-07-24 PROCEDURE — 82746 ASSAY OF FOLIC ACID SERUM: CPT

## 2025-07-24 PROCEDURE — 85610 PROTHROMBIN TIME: CPT

## 2025-07-24 PROCEDURE — 37191 INS ENDOVAS VENA CAVA FILTR: CPT

## 2025-07-24 PROCEDURE — 99222 1ST HOSP IP/OBS MODERATE 55: CPT | Mod: FS

## 2025-07-24 PROCEDURE — 99221 1ST HOSP IP/OBS SF/LOW 40: CPT | Mod: FS

## 2025-07-24 PROCEDURE — 81001 URINALYSIS AUTO W/SCOPE: CPT

## 2025-07-24 PROCEDURE — 97116 GAIT TRAINING THERAPY: CPT | Mod: GP

## 2025-07-24 PROCEDURE — 93971 EXTREMITY STUDY: CPT | Mod: 26,LT

## 2025-07-24 PROCEDURE — 99222 1ST HOSP IP/OBS MODERATE 55: CPT

## 2025-07-24 PROCEDURE — 85045 AUTOMATED RETICULOCYTE COUNT: CPT

## 2025-07-24 RX ORDER — ACETAMINOPHEN 500 MG/5ML
650 LIQUID (ML) ORAL ONCE
Refills: 0 | Status: COMPLETED | OUTPATIENT
Start: 2025-07-24 | End: 2025-07-24

## 2025-07-24 RX ORDER — ACETAMINOPHEN 500 MG/5ML
650 LIQUID (ML) ORAL EVERY 6 HOURS
Refills: 0 | Status: DISCONTINUED | OUTPATIENT
Start: 2025-07-24 | End: 2025-07-29

## 2025-07-24 RX ORDER — LIDOCAINE HYDROCHLORIDE 20 MG/ML
1 JELLY TOPICAL DAILY
Refills: 0 | Status: DISCONTINUED | OUTPATIENT
Start: 2025-07-24 | End: 2025-07-29

## 2025-07-24 RX ORDER — GABAPENTIN 400 MG/1
100 CAPSULE ORAL DAILY
Refills: 0 | Status: DISCONTINUED | OUTPATIENT
Start: 2025-07-24 | End: 2025-07-29

## 2025-07-24 RX ORDER — SENNA 187 MG
2 TABLET ORAL AT BEDTIME
Refills: 0 | Status: DISCONTINUED | OUTPATIENT
Start: 2025-07-24 | End: 2025-07-29

## 2025-07-24 RX ORDER — OXYCODONE HYDROCHLORIDE 30 MG/1
5 TABLET ORAL EVERY 6 HOURS
Refills: 0 | Status: DISCONTINUED | OUTPATIENT
Start: 2025-07-24 | End: 2025-07-29

## 2025-07-24 RX ADMIN — Medication 650 MILLIGRAM(S): at 23:04

## 2025-07-24 RX ADMIN — Medication 75 MILLILITER(S): at 17:40

## 2025-07-24 RX ADMIN — Medication 2 TABLET(S): at 21:57

## 2025-07-24 RX ADMIN — Medication 500 MILLILITER(S): at 01:57

## 2025-07-24 RX ADMIN — Medication 260 MILLIGRAM(S): at 01:57

## 2025-07-24 RX ADMIN — LIDOCAINE HYDROCHLORIDE 1 PATCH: 20 JELLY TOPICAL at 11:23

## 2025-07-24 RX ADMIN — Medication 650 MILLIGRAM(S): at 11:23

## 2025-07-24 RX ADMIN — Medication 650 MILLIGRAM(S): at 17:40

## 2025-07-25 ENCOUNTER — TRANSCRIPTION ENCOUNTER (OUTPATIENT)
Age: 89
End: 2025-07-25

## 2025-07-25 DIAGNOSIS — I82.412 ACUTE EMBOLISM AND THROMBOSIS OF LEFT FEMORAL VEIN: ICD-10-CM

## 2025-07-25 LAB
ANION GAP SERPL CALC-SCNC: 5 MMOL/L — SIGNIFICANT CHANGE UP (ref 5–17)
APTT BLD: 57.8 SEC — HIGH (ref 26.1–36.8)
APTT BLD: 62.1 SEC — HIGH (ref 26.1–36.8)
BUN SERPL-MCNC: 26 MG/DL — HIGH (ref 7–23)
CALCIUM SERPL-MCNC: 9 MG/DL — SIGNIFICANT CHANGE UP (ref 8.5–10.1)
CHLORIDE SERPL-SCNC: 108 MMOL/L — SIGNIFICANT CHANGE UP (ref 96–108)
CO2 SERPL-SCNC: 22 MMOL/L — SIGNIFICANT CHANGE UP (ref 22–31)
CREAT SERPL-MCNC: 2.04 MG/DL — HIGH (ref 0.5–1.3)
EGFR: 23 ML/MIN/1.73M2 — LOW
EGFR: 23 ML/MIN/1.73M2 — LOW
GLUCOSE SERPL-MCNC: 103 MG/DL — HIGH (ref 70–99)
HCT VFR BLD CALC: 30.1 % — LOW (ref 34.5–45)
HCT VFR BLD CALC: 30.2 % — LOW (ref 34.5–45)
HGB BLD-MCNC: 9.3 G/DL — LOW (ref 11.5–15.5)
HGB BLD-MCNC: 9.3 G/DL — LOW (ref 11.5–15.5)
MAGNESIUM SERPL-MCNC: 2.2 MG/DL — SIGNIFICANT CHANGE UP (ref 1.6–2.6)
MCHC RBC-ENTMCNC: 26.7 PG — LOW (ref 27–34)
MCHC RBC-ENTMCNC: 26.9 PG — LOW (ref 27–34)
MCHC RBC-ENTMCNC: 30.8 G/DL — LOW (ref 32–36)
MCHC RBC-ENTMCNC: 30.9 G/DL — LOW (ref 32–36)
MCV RBC AUTO: 86.8 FL — SIGNIFICANT CHANGE UP (ref 80–100)
MCV RBC AUTO: 87 FL — SIGNIFICANT CHANGE UP (ref 80–100)
NRBC # BLD AUTO: 0 K/UL — SIGNIFICANT CHANGE UP (ref 0–0)
NRBC # BLD AUTO: 0 K/UL — SIGNIFICANT CHANGE UP (ref 0–0)
NRBC # FLD: 0 K/UL — SIGNIFICANT CHANGE UP (ref 0–0)
NRBC # FLD: 0 K/UL — SIGNIFICANT CHANGE UP (ref 0–0)
NRBC BLD AUTO-RTO: 0 /100 WBCS — SIGNIFICANT CHANGE UP (ref 0–0)
NRBC BLD AUTO-RTO: 0 /100 WBCS — SIGNIFICANT CHANGE UP (ref 0–0)
PHOSPHATE SERPL-MCNC: 3.4 MG/DL — SIGNIFICANT CHANGE UP (ref 2.5–4.5)
PLATELET # BLD AUTO: 350 K/UL — SIGNIFICANT CHANGE UP (ref 150–400)
PLATELET # BLD AUTO: 369 K/UL — SIGNIFICANT CHANGE UP (ref 150–400)
PMV BLD: 8.3 FL — SIGNIFICANT CHANGE UP (ref 7–13)
PMV BLD: 8.4 FL — SIGNIFICANT CHANGE UP (ref 7–13)
POTASSIUM SERPL-MCNC: 3.9 MMOL/L — SIGNIFICANT CHANGE UP (ref 3.5–5.3)
POTASSIUM SERPL-SCNC: 3.9 MMOL/L — SIGNIFICANT CHANGE UP (ref 3.5–5.3)
RBC # BLD: 3.46 M/UL — LOW (ref 3.8–5.2)
RBC # BLD: 3.48 M/UL — LOW (ref 3.8–5.2)
RBC # FLD: 15.6 % — HIGH (ref 10.3–14.5)
RBC # FLD: 15.6 % — HIGH (ref 10.3–14.5)
SODIUM SERPL-SCNC: 135 MMOL/L — SIGNIFICANT CHANGE UP (ref 135–145)
WBC # BLD: 7.85 K/UL — SIGNIFICANT CHANGE UP (ref 3.8–10.5)
WBC # BLD: 8.04 K/UL — SIGNIFICANT CHANGE UP (ref 3.8–10.5)
WBC # FLD AUTO: 7.85 K/UL — SIGNIFICANT CHANGE UP (ref 3.8–10.5)
WBC # FLD AUTO: 8.04 K/UL — SIGNIFICANT CHANGE UP (ref 3.8–10.5)

## 2025-07-25 PROCEDURE — 71045 X-RAY EXAM CHEST 1 VIEW: CPT | Mod: 26

## 2025-07-25 PROCEDURE — 99232 SBSQ HOSP IP/OBS MODERATE 35: CPT

## 2025-07-25 PROCEDURE — 99232 SBSQ HOSP IP/OBS MODERATE 35: CPT | Mod: FS

## 2025-07-25 RX ORDER — HEPARIN SODIUM 1000 [USP'U]/ML
INJECTION INTRAVENOUS; SUBCUTANEOUS
Qty: 25000 | Refills: 0 | Status: DISCONTINUED | OUTPATIENT
Start: 2025-07-25 | End: 2025-07-26

## 2025-07-25 RX ORDER — HEPARIN SODIUM 1000 [USP'U]/ML
4000 INJECTION INTRAVENOUS; SUBCUTANEOUS EVERY 6 HOURS
Refills: 0 | Status: DISCONTINUED | OUTPATIENT
Start: 2025-07-25 | End: 2025-07-26

## 2025-07-25 RX ORDER — HEPARIN SODIUM 1000 [USP'U]/ML
4000 INJECTION INTRAVENOUS; SUBCUTANEOUS ONCE
Refills: 0 | Status: COMPLETED | OUTPATIENT
Start: 2025-07-25 | End: 2025-07-25

## 2025-07-25 RX ORDER — HEPARIN SODIUM 1000 [USP'U]/ML
2000 INJECTION INTRAVENOUS; SUBCUTANEOUS EVERY 6 HOURS
Refills: 0 | Status: DISCONTINUED | OUTPATIENT
Start: 2025-07-25 | End: 2025-07-26

## 2025-07-25 RX ADMIN — Medication 75 MILLILITER(S): at 23:42

## 2025-07-25 RX ADMIN — HEPARIN SODIUM 900 UNIT(S)/HR: 1000 INJECTION INTRAVENOUS; SUBCUTANEOUS at 05:37

## 2025-07-25 RX ADMIN — Medication 75 MILLILITER(S): at 10:35

## 2025-07-25 RX ADMIN — LIDOCAINE HYDROCHLORIDE 1 PATCH: 20 JELLY TOPICAL at 10:34

## 2025-07-25 RX ADMIN — HEPARIN SODIUM 1000 UNIT(S)/HR: 1000 INJECTION INTRAVENOUS; SUBCUTANEOUS at 23:08

## 2025-07-25 RX ADMIN — Medication 2 TABLET(S): at 21:38

## 2025-07-25 RX ADMIN — Medication 650 MILLIGRAM(S): at 05:56

## 2025-07-25 RX ADMIN — Medication 650 MILLIGRAM(S): at 13:23

## 2025-07-25 RX ADMIN — HEPARIN SODIUM 4000 UNIT(S): 1000 INJECTION INTRAVENOUS; SUBCUTANEOUS at 05:37

## 2025-07-25 RX ADMIN — Medication 650 MILLIGRAM(S): at 18:07

## 2025-07-25 RX ADMIN — HEPARIN SODIUM 900 UNIT(S)/HR: 1000 INJECTION INTRAVENOUS; SUBCUTANEOUS at 07:18

## 2025-07-25 RX ADMIN — Medication 650 MILLIGRAM(S): at 23:42

## 2025-07-25 RX ADMIN — Medication 40 MILLIGRAM(S): at 05:56

## 2025-07-25 RX ADMIN — HEPARIN SODIUM 1000 UNIT(S)/HR: 1000 INJECTION INTRAVENOUS; SUBCUTANEOUS at 14:55

## 2025-07-25 RX ADMIN — HEPARIN SODIUM 2000 UNIT(S): 1000 INJECTION INTRAVENOUS; SUBCUTANEOUS at 15:12

## 2025-07-25 RX ADMIN — HEPARIN SODIUM 1000 UNIT(S)/HR: 1000 INJECTION INTRAVENOUS; SUBCUTANEOUS at 21:36

## 2025-07-25 RX ADMIN — HEPARIN SODIUM 1000 UNIT(S)/HR: 1000 INJECTION INTRAVENOUS; SUBCUTANEOUS at 19:30

## 2025-07-26 LAB
ANION GAP SERPL CALC-SCNC: 6 MMOL/L — SIGNIFICANT CHANGE UP (ref 5–17)
APTT BLD: 79.4 SEC — HIGH (ref 26.1–36.8)
BUN SERPL-MCNC: 27 MG/DL — HIGH (ref 7–23)
CALCIUM SERPL-MCNC: 8.1 MG/DL — LOW (ref 8.5–10.1)
CHLORIDE SERPL-SCNC: 109 MMOL/L — HIGH (ref 96–108)
CO2 SERPL-SCNC: 19 MMOL/L — LOW (ref 22–31)
CREAT SERPL-MCNC: 2.2 MG/DL — HIGH (ref 0.5–1.3)
EGFR: 21 ML/MIN/1.73M2 — LOW
EGFR: 21 ML/MIN/1.73M2 — LOW
GLUCOSE SERPL-MCNC: 112 MG/DL — HIGH (ref 70–99)
HCT VFR BLD CALC: 26.8 % — LOW (ref 34.5–45)
HCT VFR BLD CALC: 29.7 % — LOW (ref 34.5–45)
HCT VFR BLD CALC: 30.7 % — LOW (ref 34.5–45)
HCT VFR BLD CALC: 30.9 % — LOW (ref 34.5–45)
HGB BLD-MCNC: 8.4 G/DL — LOW (ref 11.5–15.5)
HGB BLD-MCNC: 9.1 G/DL — LOW (ref 11.5–15.5)
HGB BLD-MCNC: 9.5 G/DL — LOW (ref 11.5–15.5)
HGB BLD-MCNC: 9.5 G/DL — LOW (ref 11.5–15.5)
MAGNESIUM SERPL-MCNC: 1.8 MG/DL — SIGNIFICANT CHANGE UP (ref 1.6–2.6)
MCHC RBC-ENTMCNC: 26.3 PG — LOW (ref 27–34)
MCHC RBC-ENTMCNC: 26.3 PG — LOW (ref 27–34)
MCHC RBC-ENTMCNC: 26.4 PG — LOW (ref 27–34)
MCHC RBC-ENTMCNC: 26.8 PG — LOW (ref 27–34)
MCHC RBC-ENTMCNC: 30.6 G/DL — LOW (ref 32–36)
MCHC RBC-ENTMCNC: 30.7 G/DL — LOW (ref 32–36)
MCHC RBC-ENTMCNC: 30.9 G/DL — LOW (ref 32–36)
MCHC RBC-ENTMCNC: 31.3 G/DL — LOW (ref 32–36)
MCV RBC AUTO: 85.3 FL — SIGNIFICANT CHANGE UP (ref 80–100)
MCV RBC AUTO: 85.6 FL — SIGNIFICANT CHANGE UP (ref 80–100)
MCV RBC AUTO: 85.6 FL — SIGNIFICANT CHANGE UP (ref 80–100)
MCV RBC AUTO: 85.8 FL — SIGNIFICANT CHANGE UP (ref 80–100)
NRBC # BLD AUTO: 0 K/UL — SIGNIFICANT CHANGE UP (ref 0–0)
NRBC # FLD: 0 K/UL — SIGNIFICANT CHANGE UP (ref 0–0)
NRBC BLD AUTO-RTO: 0 /100 WBCS — SIGNIFICANT CHANGE UP (ref 0–0)
PHOSPHATE SERPL-MCNC: 3 MG/DL — SIGNIFICANT CHANGE UP (ref 2.5–4.5)
PLATELET # BLD AUTO: 326 K/UL — SIGNIFICANT CHANGE UP (ref 150–400)
PLATELET # BLD AUTO: 373 K/UL — SIGNIFICANT CHANGE UP (ref 150–400)
PLATELET # BLD AUTO: 400 K/UL — SIGNIFICANT CHANGE UP (ref 150–400)
PLATELET # BLD AUTO: 411 K/UL — HIGH (ref 150–400)
PMV BLD: 8.2 FL — SIGNIFICANT CHANGE UP (ref 7–13)
PMV BLD: 8.3 FL — SIGNIFICANT CHANGE UP (ref 7–13)
PMV BLD: 8.4 FL — SIGNIFICANT CHANGE UP (ref 7–13)
PMV BLD: 8.5 FL — SIGNIFICANT CHANGE UP (ref 7–13)
POTASSIUM SERPL-MCNC: 3.9 MMOL/L — SIGNIFICANT CHANGE UP (ref 3.5–5.3)
POTASSIUM SERPL-SCNC: 3.9 MMOL/L — SIGNIFICANT CHANGE UP (ref 3.5–5.3)
RBC # BLD: 3.13 M/UL — LOW (ref 3.8–5.2)
RBC # BLD: 3.46 M/UL — LOW (ref 3.8–5.2)
RBC # BLD: 3.6 M/UL — LOW (ref 3.8–5.2)
RBC # BLD: 3.61 M/UL — LOW (ref 3.8–5.2)
RBC # FLD: 15.5 % — HIGH (ref 10.3–14.5)
RBC # FLD: 15.5 % — HIGH (ref 10.3–14.5)
RBC # FLD: 15.6 % — HIGH (ref 10.3–14.5)
RBC # FLD: 15.6 % — HIGH (ref 10.3–14.5)
SODIUM SERPL-SCNC: 134 MMOL/L — LOW (ref 135–145)
WBC # BLD: 5.89 K/UL — SIGNIFICANT CHANGE UP (ref 3.8–10.5)
WBC # BLD: 6.19 K/UL — SIGNIFICANT CHANGE UP (ref 3.8–10.5)
WBC # BLD: 6.29 K/UL — SIGNIFICANT CHANGE UP (ref 3.8–10.5)
WBC # BLD: 6.67 K/UL — SIGNIFICANT CHANGE UP (ref 3.8–10.5)
WBC # FLD AUTO: 5.89 K/UL — SIGNIFICANT CHANGE UP (ref 3.8–10.5)
WBC # FLD AUTO: 6.19 K/UL — SIGNIFICANT CHANGE UP (ref 3.8–10.5)
WBC # FLD AUTO: 6.29 K/UL — SIGNIFICANT CHANGE UP (ref 3.8–10.5)
WBC # FLD AUTO: 6.67 K/UL — SIGNIFICANT CHANGE UP (ref 3.8–10.5)

## 2025-07-26 PROCEDURE — 99231 SBSQ HOSP IP/OBS SF/LOW 25: CPT

## 2025-07-26 PROCEDURE — 99223 1ST HOSP IP/OBS HIGH 75: CPT | Mod: FS

## 2025-07-26 PROCEDURE — 99232 SBSQ HOSP IP/OBS MODERATE 35: CPT

## 2025-07-26 PROCEDURE — 71045 X-RAY EXAM CHEST 1 VIEW: CPT | Mod: 26

## 2025-07-26 RX ORDER — MAGNESIUM OXIDE 400 MG
400 TABLET ORAL ONCE
Refills: 0 | Status: COMPLETED | OUTPATIENT
Start: 2025-07-26 | End: 2025-07-26

## 2025-07-26 RX ADMIN — LIDOCAINE HYDROCHLORIDE 1 PATCH: 20 JELLY TOPICAL at 21:56

## 2025-07-26 RX ADMIN — LIDOCAINE HYDROCHLORIDE 1 PATCH: 20 JELLY TOPICAL at 19:42

## 2025-07-26 RX ADMIN — Medication 650 MILLIGRAM(S): at 05:27

## 2025-07-26 RX ADMIN — Medication 40 MILLIGRAM(S): at 05:27

## 2025-07-26 RX ADMIN — Medication 2 TABLET(S): at 21:07

## 2025-07-26 RX ADMIN — Medication 75 MILLILITER(S): at 19:47

## 2025-07-26 RX ADMIN — Medication 400 MILLIGRAM(S): at 15:02

## 2025-07-26 RX ADMIN — Medication 650 MILLIGRAM(S): at 15:00

## 2025-07-26 RX ADMIN — LIDOCAINE HYDROCHLORIDE 1 PATCH: 20 JELLY TOPICAL at 10:06

## 2025-07-27 LAB
ANION GAP SERPL CALC-SCNC: 9 MMOL/L — SIGNIFICANT CHANGE UP (ref 5–17)
APPEARANCE UR: ABNORMAL
BACTERIA # UR AUTO: ABNORMAL /HPF
BILIRUB DIRECT SERPL-MCNC: 0.1 MG/DL — SIGNIFICANT CHANGE UP (ref 0–0.3)
BILIRUB INDIRECT FLD-MCNC: 0.2 MG/DL — SIGNIFICANT CHANGE UP (ref 0.2–1)
BILIRUB SERPL-MCNC: 0.3 MG/DL — SIGNIFICANT CHANGE UP (ref 0.2–1.2)
BILIRUB UR-MCNC: NEGATIVE — SIGNIFICANT CHANGE UP
BUN SERPL-MCNC: 22 MG/DL — SIGNIFICANT CHANGE UP (ref 7–23)
CALCIUM SERPL-MCNC: 8.5 MG/DL — SIGNIFICANT CHANGE UP (ref 8.5–10.1)
CAST: 9 /LPF — HIGH (ref 0–4)
CHLORIDE SERPL-SCNC: 108 MMOL/L — SIGNIFICANT CHANGE UP (ref 96–108)
CO2 SERPL-SCNC: 21 MMOL/L — LOW (ref 22–31)
COLOR SPEC: YELLOW — SIGNIFICANT CHANGE UP
CREAT SERPL-MCNC: 2.07 MG/DL — HIGH (ref 0.5–1.3)
DIFF PNL FLD: ABNORMAL
EGFR: 23 ML/MIN/1.73M2 — LOW
EGFR: 23 ML/MIN/1.73M2 — LOW
EPI CELLS # UR: SIGNIFICANT CHANGE UP
FERRITIN SERPL-MCNC: 437 NG/ML — HIGH (ref 13–330)
FIBRINOGEN PPP-MCNC: 419 MG/DL — SIGNIFICANT CHANGE UP (ref 200–435)
FOLATE SERPL-MCNC: 8.6 NG/ML — SIGNIFICANT CHANGE UP
GLUCOSE BLDC GLUCOMTR-MCNC: 136 MG/DL — HIGH (ref 70–99)
GLUCOSE SERPL-MCNC: 104 MG/DL — HIGH (ref 70–99)
GLUCOSE UR QL: NEGATIVE MG/DL — SIGNIFICANT CHANGE UP
GRAN CASTS # UR COMP ASSIST: SIGNIFICANT CHANGE UP
HAPTOGLOB SERPL-MCNC: 247 MG/DL — HIGH (ref 34–200)
HCT VFR BLD CALC: 30.7 % — LOW (ref 34.5–45)
HCT VFR BLD CALC: 33.7 % — LOW (ref 34.5–45)
HGB BLD-MCNC: 10.4 G/DL — LOW (ref 11.5–15.5)
HGB BLD-MCNC: 9.4 G/DL — LOW (ref 11.5–15.5)
HYALINE CASTS # UR AUTO: SIGNIFICANT CHANGE UP
IMMATURE RETICULOCYTE FRACTION %: 9.3 % — SIGNIFICANT CHANGE UP
INR BLD: 1 RATIO — SIGNIFICANT CHANGE UP (ref 0.85–1.16)
IRON SATN MFR SERPL: 12 % — LOW (ref 14–50)
IRON SATN MFR SERPL: 17 UG/DL — LOW (ref 30–160)
KETONES UR QL: NEGATIVE MG/DL — SIGNIFICANT CHANGE UP
LDH SERPL L TO P-CCNC: 110 U/L — SIGNIFICANT CHANGE UP (ref 84–241)
LEUKOCYTE ESTERASE UR-ACNC: ABNORMAL
MAGNESIUM SERPL-MCNC: 1.8 MG/DL — SIGNIFICANT CHANGE UP (ref 1.6–2.6)
MCHC RBC-ENTMCNC: 26.3 PG — LOW (ref 27–34)
MCHC RBC-ENTMCNC: 26.5 PG — LOW (ref 27–34)
MCHC RBC-ENTMCNC: 30.6 G/DL — LOW (ref 32–36)
MCHC RBC-ENTMCNC: 30.9 G/DL — LOW (ref 32–36)
MCV RBC AUTO: 85.8 FL — SIGNIFICANT CHANGE UP (ref 80–100)
MCV RBC AUTO: 86 FL — SIGNIFICANT CHANGE UP (ref 80–100)
NITRITE UR-MCNC: NEGATIVE — SIGNIFICANT CHANGE UP
NRBC # BLD AUTO: 0 K/UL — SIGNIFICANT CHANGE UP (ref 0–0)
NRBC # BLD AUTO: 0 K/UL — SIGNIFICANT CHANGE UP (ref 0–0)
NRBC # FLD: 0 K/UL — SIGNIFICANT CHANGE UP (ref 0–0)
NRBC # FLD: 0 K/UL — SIGNIFICANT CHANGE UP (ref 0–0)
NRBC BLD AUTO-RTO: 0 /100 WBCS — SIGNIFICANT CHANGE UP (ref 0–0)
NRBC BLD AUTO-RTO: 0 /100 WBCS — SIGNIFICANT CHANGE UP (ref 0–0)
PH UR: 5.5 — SIGNIFICANT CHANGE UP (ref 5–8)
PHOSPHATE SERPL-MCNC: 3 MG/DL — SIGNIFICANT CHANGE UP (ref 2.5–4.5)
PLATELET # BLD AUTO: 375 K/UL — SIGNIFICANT CHANGE UP (ref 150–400)
PLATELET # BLD AUTO: 436 K/UL — HIGH (ref 150–400)
PMV BLD: 8.1 FL — SIGNIFICANT CHANGE UP (ref 7–13)
PMV BLD: 8.3 FL — SIGNIFICANT CHANGE UP (ref 7–13)
POTASSIUM SERPL-MCNC: 3.9 MMOL/L — SIGNIFICANT CHANGE UP (ref 3.5–5.3)
POTASSIUM SERPL-SCNC: 3.9 MMOL/L — SIGNIFICANT CHANGE UP (ref 3.5–5.3)
PROT UR-MCNC: 100 MG/DL
PROTHROM AB SERPL-ACNC: 11.5 SEC — SIGNIFICANT CHANGE UP (ref 9.9–13.4)
RBC # BLD: 3.57 M/UL — LOW (ref 3.8–5.2)
RBC # BLD: 3.57 M/UL — LOW (ref 3.8–5.2)
RBC # BLD: 3.93 M/UL — SIGNIFICANT CHANGE UP (ref 3.8–5.2)
RBC # FLD: 15.6 % — HIGH (ref 10.3–14.5)
RBC # FLD: 15.7 % — HIGH (ref 10.3–14.5)
RBC CASTS # UR COMP ASSIST: 54 /HPF — HIGH (ref 0–4)
RETICS #: 44.3 K/UL — SIGNIFICANT CHANGE UP (ref 25–125)
RETICS/RBC NFR: 1.2 % — SIGNIFICANT CHANGE UP (ref 0.5–2.5)
RETICULOCYTE HEMOGLOBIN EQUIVALENT: 28.1 PG — LOW (ref 30.6–40.7)
SODIUM SERPL-SCNC: 138 MMOL/L — SIGNIFICANT CHANGE UP (ref 135–145)
SP GR SPEC: 1.01 — SIGNIFICANT CHANGE UP (ref 1–1.03)
SQUAMOUS # UR AUTO: 32 /HPF — HIGH (ref 0–5)
TIBC SERPL-MCNC: 147 UG/DL — LOW (ref 220–430)
UIBC SERPL-MCNC: 130 UG/DL — SIGNIFICANT CHANGE UP (ref 110–370)
UROBILINOGEN FLD QL: 0.2 MG/DL — SIGNIFICANT CHANGE UP (ref 0.2–1)
VIT B12 SERPL-MCNC: 1260 PG/ML — HIGH (ref 232–1245)
WBC # BLD: 5.79 K/UL — SIGNIFICANT CHANGE UP (ref 3.8–10.5)
WBC # BLD: 9.34 K/UL — SIGNIFICANT CHANGE UP (ref 3.8–10.5)
WBC # FLD AUTO: 5.79 K/UL — SIGNIFICANT CHANGE UP (ref 3.8–10.5)
WBC # FLD AUTO: 9.34 K/UL — SIGNIFICANT CHANGE UP (ref 3.8–10.5)
WBC UR QL: >998 /HPF — HIGH (ref 0–5)

## 2025-07-27 PROCEDURE — 99232 SBSQ HOSP IP/OBS MODERATE 35: CPT

## 2025-07-27 PROCEDURE — 99222 1ST HOSP IP/OBS MODERATE 55: CPT

## 2025-07-27 RX ADMIN — LIDOCAINE HYDROCHLORIDE 1 PATCH: 20 JELLY TOPICAL at 22:12

## 2025-07-27 RX ADMIN — Medication 40 MILLIGRAM(S): at 05:39

## 2025-07-27 RX ADMIN — LIDOCAINE HYDROCHLORIDE 1 PATCH: 20 JELLY TOPICAL at 20:25

## 2025-07-27 RX ADMIN — Medication 650 MILLIGRAM(S): at 05:39

## 2025-07-27 RX ADMIN — Medication 650 MILLIGRAM(S): at 17:58

## 2025-07-27 RX ADMIN — LIDOCAINE HYDROCHLORIDE 1 PATCH: 20 JELLY TOPICAL at 10:37

## 2025-07-27 RX ADMIN — Medication 75 MILLILITER(S): at 20:30

## 2025-07-27 RX ADMIN — Medication 500 MILLILITER(S): at 19:25

## 2025-07-27 RX ADMIN — Medication 75 MILLILITER(S): at 10:36

## 2025-07-28 LAB
ANION GAP SERPL CALC-SCNC: 8 MMOL/L — SIGNIFICANT CHANGE UP (ref 5–17)
APPEARANCE UR: ABNORMAL
APTT BLD: 28.1 SEC — SIGNIFICANT CHANGE UP (ref 26.1–36.8)
BILIRUB UR-MCNC: NEGATIVE — SIGNIFICANT CHANGE UP
BUN SERPL-MCNC: 21 MG/DL — SIGNIFICANT CHANGE UP (ref 7–23)
CALCIUM SERPL-MCNC: 8.1 MG/DL — LOW (ref 8.5–10.1)
CHLORIDE SERPL-SCNC: 110 MMOL/L — HIGH (ref 96–108)
CO2 SERPL-SCNC: 17 MMOL/L — LOW (ref 22–31)
COLOR SPEC: YELLOW — SIGNIFICANT CHANGE UP
CREAT SERPL-MCNC: 2.01 MG/DL — HIGH (ref 0.5–1.3)
DIFF PNL FLD: ABNORMAL
EGFR: 23 ML/MIN/1.73M2 — LOW
EGFR: 23 ML/MIN/1.73M2 — LOW
GLUCOSE SERPL-MCNC: 124 MG/DL — HIGH (ref 70–99)
GLUCOSE UR QL: NEGATIVE MG/DL — SIGNIFICANT CHANGE UP
HCT VFR BLD CALC: 27.5 % — LOW (ref 34.5–45)
HGB BLD-MCNC: 8.4 G/DL — LOW (ref 11.5–15.5)
INR BLD: 1.04 RATIO — SIGNIFICANT CHANGE UP (ref 0.85–1.16)
KETONES UR QL: NEGATIVE MG/DL — SIGNIFICANT CHANGE UP
LEUKOCYTE ESTERASE UR-ACNC: ABNORMAL
MAGNESIUM SERPL-MCNC: 1.8 MG/DL — SIGNIFICANT CHANGE UP (ref 1.6–2.6)
MCHC RBC-ENTMCNC: 26 PG — LOW (ref 27–34)
MCHC RBC-ENTMCNC: 30.5 G/DL — LOW (ref 32–36)
MCV RBC AUTO: 85.1 FL — SIGNIFICANT CHANGE UP (ref 80–100)
NITRITE UR-MCNC: POSITIVE
NRBC # BLD AUTO: 0 K/UL — SIGNIFICANT CHANGE UP (ref 0–0)
NRBC # FLD: 0 K/UL — SIGNIFICANT CHANGE UP (ref 0–0)
NRBC BLD AUTO-RTO: 0 /100 WBCS — SIGNIFICANT CHANGE UP (ref 0–0)
PH UR: 5.5 — SIGNIFICANT CHANGE UP (ref 5–8)
PHOSPHATE SERPL-MCNC: 3.9 MG/DL — SIGNIFICANT CHANGE UP (ref 2.5–4.5)
PLATELET # BLD AUTO: 371 K/UL — SIGNIFICANT CHANGE UP (ref 150–400)
PMV BLD: 8.5 FL — SIGNIFICANT CHANGE UP (ref 7–13)
POTASSIUM SERPL-MCNC: 3.8 MMOL/L — SIGNIFICANT CHANGE UP (ref 3.5–5.3)
POTASSIUM SERPL-SCNC: 3.8 MMOL/L — SIGNIFICANT CHANGE UP (ref 3.5–5.3)
PROT UR-MCNC: 100 MG/DL
PROTHROM AB SERPL-ACNC: 12.3 SEC — SIGNIFICANT CHANGE UP (ref 9.9–13.4)
RBC # BLD: 3.23 M/UL — LOW (ref 3.8–5.2)
RBC # FLD: 15.9 % — HIGH (ref 10.3–14.5)
SODIUM SERPL-SCNC: 135 MMOL/L — SIGNIFICANT CHANGE UP (ref 135–145)
SP GR SPEC: 1.01 — SIGNIFICANT CHANGE UP (ref 1–1.03)
UROBILINOGEN FLD QL: 1 MG/DL — SIGNIFICANT CHANGE UP (ref 0.2–1)
WBC # BLD: 13.17 K/UL — HIGH (ref 3.8–10.5)
WBC # FLD AUTO: 13.17 K/UL — HIGH (ref 3.8–10.5)

## 2025-07-28 PROCEDURE — 37191 INS ENDOVAS VENA CAVA FILTR: CPT

## 2025-07-28 PROCEDURE — 99232 SBSQ HOSP IP/OBS MODERATE 35: CPT | Mod: FS

## 2025-07-28 PROCEDURE — 99222 1ST HOSP IP/OBS MODERATE 55: CPT

## 2025-07-28 RX ORDER — CEFTRIAXONE 500 MG/1
1000 INJECTION, POWDER, FOR SOLUTION INTRAMUSCULAR; INTRAVENOUS EVERY 24 HOURS
Refills: 0 | Status: DISCONTINUED | OUTPATIENT
Start: 2025-07-28 | End: 2025-07-28

## 2025-07-28 RX ORDER — CEFTRIAXONE 500 MG/1
1000 INJECTION, POWDER, FOR SOLUTION INTRAMUSCULAR; INTRAVENOUS EVERY 24 HOURS
Refills: 0 | Status: DISCONTINUED | OUTPATIENT
Start: 2025-07-28 | End: 2025-07-29

## 2025-07-28 RX ADMIN — CEFTRIAXONE 1000 MILLIGRAM(S): 500 INJECTION, POWDER, FOR SOLUTION INTRAMUSCULAR; INTRAVENOUS at 10:05

## 2025-07-28 RX ADMIN — LIDOCAINE HYDROCHLORIDE 1 PATCH: 20 JELLY TOPICAL at 21:40

## 2025-07-28 RX ADMIN — LIDOCAINE HYDROCHLORIDE 1 PATCH: 20 JELLY TOPICAL at 10:05

## 2025-07-28 RX ADMIN — Medication 40 MILLIGRAM(S): at 05:46

## 2025-07-28 RX ADMIN — Medication 650 MILLIGRAM(S): at 11:39

## 2025-07-28 RX ADMIN — Medication 650 MILLIGRAM(S): at 06:37

## 2025-07-28 RX ADMIN — Medication 75 MILLILITER(S): at 11:38

## 2025-07-28 RX ADMIN — Medication 650 MILLIGRAM(S): at 12:00

## 2025-07-28 RX ADMIN — Medication 650 MILLIGRAM(S): at 05:46

## 2025-07-29 ENCOUNTER — TRANSCRIPTION ENCOUNTER (OUTPATIENT)
Age: 89
End: 2025-07-29

## 2025-07-29 VITALS — WEIGHT: 104.06 LBS

## 2025-07-29 LAB
ANION GAP SERPL CALC-SCNC: 5 MMOL/L — SIGNIFICANT CHANGE UP (ref 5–17)
BUN SERPL-MCNC: 19 MG/DL — SIGNIFICANT CHANGE UP (ref 7–23)
CALCIUM SERPL-MCNC: 8.8 MG/DL — SIGNIFICANT CHANGE UP (ref 8.5–10.1)
CHLORIDE SERPL-SCNC: 113 MMOL/L — HIGH (ref 96–108)
CO2 SERPL-SCNC: 21 MMOL/L — LOW (ref 22–31)
CREAT SERPL-MCNC: 2.05 MG/DL — HIGH (ref 0.5–1.3)
EGFR: 23 ML/MIN/1.73M2 — LOW
EGFR: 23 ML/MIN/1.73M2 — LOW
EPO SERPL-MCNC: 9.3 MIU/ML — SIGNIFICANT CHANGE UP (ref 2.6–18.5)
GLUCOSE SERPL-MCNC: 100 MG/DL — HIGH (ref 70–99)
HCT VFR BLD CALC: 32.5 % — LOW (ref 34.5–45)
HGB BLD-MCNC: 10.3 G/DL — LOW (ref 11.5–15.5)
MAGNESIUM SERPL-MCNC: 2 MG/DL — SIGNIFICANT CHANGE UP (ref 1.6–2.6)
MCHC RBC-ENTMCNC: 27.4 PG — SIGNIFICANT CHANGE UP (ref 27–34)
MCHC RBC-ENTMCNC: 31.7 G/DL — LOW (ref 32–36)
MCV RBC AUTO: 86.4 FL — SIGNIFICANT CHANGE UP (ref 80–100)
NRBC # BLD AUTO: 0 K/UL — SIGNIFICANT CHANGE UP (ref 0–0)
NRBC # FLD: 0 K/UL — SIGNIFICANT CHANGE UP (ref 0–0)
NRBC BLD AUTO-RTO: 0 /100 WBCS — SIGNIFICANT CHANGE UP (ref 0–0)
PHOSPHATE SERPL-MCNC: 3.4 MG/DL — SIGNIFICANT CHANGE UP (ref 2.5–4.5)
PLATELET # BLD AUTO: 316 K/UL — SIGNIFICANT CHANGE UP (ref 150–400)
PMV BLD: 7.9 FL — SIGNIFICANT CHANGE UP (ref 7–13)
POTASSIUM SERPL-MCNC: 3.9 MMOL/L — SIGNIFICANT CHANGE UP (ref 3.5–5.3)
POTASSIUM SERPL-SCNC: 3.9 MMOL/L — SIGNIFICANT CHANGE UP (ref 3.5–5.3)
RBC # BLD: 3.76 M/UL — LOW (ref 3.8–5.2)
RBC # FLD: 15.6 % — HIGH (ref 10.3–14.5)
SODIUM SERPL-SCNC: 139 MMOL/L — SIGNIFICANT CHANGE UP (ref 135–145)
WBC # BLD: 8.88 K/UL — SIGNIFICANT CHANGE UP (ref 3.8–10.5)
WBC # FLD AUTO: 8.88 K/UL — SIGNIFICANT CHANGE UP (ref 3.8–10.5)

## 2025-07-29 PROCEDURE — 99239 HOSP IP/OBS DSCHRG MGMT >30: CPT

## 2025-07-29 PROCEDURE — 99232 SBSQ HOSP IP/OBS MODERATE 35: CPT

## 2025-07-29 RX ORDER — ACETAMINOPHEN 500 MG/5ML
1 LIQUID (ML) ORAL
Qty: 0 | Refills: 0 | DISCHARGE
Start: 2025-07-29

## 2025-07-29 RX ORDER — CEFUROXIME SODIUM 1.5 G
250 VIAL (EA) INJECTION EVERY 12 HOURS
Refills: 0 | Status: DISCONTINUED | OUTPATIENT
Start: 2025-07-29 | End: 2025-07-29

## 2025-07-29 RX ORDER — CEFUROXIME SODIUM 1.5 G
1 VIAL (EA) INJECTION
Qty: 10 | Refills: 0
Start: 2025-07-29 | End: 2025-08-02

## 2025-07-29 RX ORDER — CEFUROXIME SODIUM 1.5 G
1 VIAL (EA) INJECTION
Qty: 0 | Refills: 0 | DISCHARGE
Start: 2025-07-29

## 2025-07-29 RX ORDER — LIDOCAINE HYDROCHLORIDE 20 MG/ML
1 JELLY TOPICAL
Qty: 1 | Refills: 0
Start: 2025-07-29 | End: 2025-08-02

## 2025-07-29 RX ORDER — LIDOCAINE HYDROCHLORIDE 20 MG/ML
1 JELLY TOPICAL
Qty: 0 | Refills: 0 | DISCHARGE
Start: 2025-07-29

## 2025-07-29 RX ADMIN — Medication 650 MILLIGRAM(S): at 11:16

## 2025-07-29 RX ADMIN — Medication 650 MILLIGRAM(S): at 06:03

## 2025-07-29 RX ADMIN — Medication 40 MILLIGRAM(S): at 06:02

## 2025-07-29 RX ADMIN — Medication 650 MILLIGRAM(S): at 06:16

## 2025-07-29 RX ADMIN — Medication 250 MILLIGRAM(S): at 11:19

## 2025-07-31 LAB
-  AMOXICILLIN/CLAVULANIC ACID: SIGNIFICANT CHANGE UP
-  AMPICILLIN/SULBACTAM: SIGNIFICANT CHANGE UP
-  AMPICILLIN: SIGNIFICANT CHANGE UP
-  AZTREONAM: SIGNIFICANT CHANGE UP
-  CEFAZOLIN: SIGNIFICANT CHANGE UP
-  CEFEPIME: SIGNIFICANT CHANGE UP
-  CEFOXITIN: SIGNIFICANT CHANGE UP
-  CEFTRIAXONE: SIGNIFICANT CHANGE UP
-  CEFUROXIME: SIGNIFICANT CHANGE UP
-  CIPROFLOXACIN: SIGNIFICANT CHANGE UP
-  ERTAPENEM: SIGNIFICANT CHANGE UP
-  GENTAMICIN: SIGNIFICANT CHANGE UP
-  IMIPENEM: SIGNIFICANT CHANGE UP
-  LEVOFLOXACIN: SIGNIFICANT CHANGE UP
-  MEROPENEM: SIGNIFICANT CHANGE UP
-  NITROFURANTOIN: SIGNIFICANT CHANGE UP
-  PIPERACILLIN/TAZOBACTAM: SIGNIFICANT CHANGE UP
-  TIGECYCLINE: SIGNIFICANT CHANGE UP
-  TOBRAMYCIN: SIGNIFICANT CHANGE UP
-  TRIMETHOPRIM/SULFAMETHOXAZOLE: SIGNIFICANT CHANGE UP
METHOD TYPE: SIGNIFICANT CHANGE UP

## 2025-08-01 LAB
-  AMOXICILLIN/CLAVULANIC ACID: SIGNIFICANT CHANGE UP
-  AMPICILLIN/SULBACTAM: SIGNIFICANT CHANGE UP
-  AMPICILLIN: SIGNIFICANT CHANGE UP
-  AZTREONAM: SIGNIFICANT CHANGE UP
-  CEFAZOLIN: SIGNIFICANT CHANGE UP
-  CEFEPIME: SIGNIFICANT CHANGE UP
-  CEFOXITIN: SIGNIFICANT CHANGE UP
-  CEFTRIAXONE: SIGNIFICANT CHANGE UP
-  CEFUROXIME: SIGNIFICANT CHANGE UP
-  CIPROFLOXACIN: SIGNIFICANT CHANGE UP
-  ERTAPENEM: SIGNIFICANT CHANGE UP
-  GENTAMICIN: SIGNIFICANT CHANGE UP
-  IMIPENEM: SIGNIFICANT CHANGE UP
-  LEVOFLOXACIN: SIGNIFICANT CHANGE UP
-  MEROPENEM: SIGNIFICANT CHANGE UP
-  NITROFURANTOIN: SIGNIFICANT CHANGE UP
-  PIPERACILLIN/TAZOBACTAM: SIGNIFICANT CHANGE UP
-  TIGECYCLINE: SIGNIFICANT CHANGE UP
-  TOBRAMYCIN: SIGNIFICANT CHANGE UP
-  TRIMETHOPRIM/SULFAMETHOXAZOLE: SIGNIFICANT CHANGE UP
CULTURE RESULTS: ABNORMAL
METHOD TYPE: SIGNIFICANT CHANGE UP
ORGANISM # SPEC MICROSCOPIC CNT: ABNORMAL
ORGANISM # SPEC MICROSCOPIC CNT: ABNORMAL
ORGANISM # SPEC MICROSCOPIC CNT: SIGNIFICANT CHANGE UP
SPECIMEN SOURCE: SIGNIFICANT CHANGE UP

## 2025-08-06 DIAGNOSIS — N18.4 CHRONIC KIDNEY DISEASE, STAGE 4 (SEVERE): ICD-10-CM

## 2025-08-06 DIAGNOSIS — I12.9 HYPERTENSIVE CHRONIC KIDNEY DISEASE WITH STAGE 1 THROUGH STAGE 4 CHRONIC KIDNEY DISEASE, OR UNSPECIFIED CHRONIC KIDNEY DISEASE: ICD-10-CM

## 2025-08-06 DIAGNOSIS — J93.9 PNEUMOTHORAX, UNSPECIFIED: ICD-10-CM

## 2025-08-06 DIAGNOSIS — S50.812A ABRASION OF LEFT FOREARM, INITIAL ENCOUNTER: ICD-10-CM

## 2025-08-06 DIAGNOSIS — D63.1 ANEMIA IN CHRONIC KIDNEY DISEASE: ICD-10-CM

## 2025-08-06 DIAGNOSIS — N39.0 URINARY TRACT INFECTION, SITE NOT SPECIFIED: ICD-10-CM
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
## 2025-08-06 DIAGNOSIS — N81.10 CYSTOCELE, UNSPECIFIED: ICD-10-CM

## 2025-08-06 DIAGNOSIS — J94.2 HEMOTHORAX: ICD-10-CM

## 2025-08-06 DIAGNOSIS — B96.20 UNSPECIFIED ESCHERICHIA COLI [E. COLI] AS THE CAUSE OF DISEASES CLASSIFIED ELSEWHERE: ICD-10-CM

## 2025-08-06 DIAGNOSIS — R04.0 EPISTAXIS: ICD-10-CM
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
## 2025-08-06 DIAGNOSIS — R33.9 RETENTION OF URINE, UNSPECIFIED: ICD-10-CM

## 2025-08-06 DIAGNOSIS — K92.1 MELENA: ICD-10-CM

## 2025-08-06 DIAGNOSIS — I82.432 ACUTE EMBOLISM AND THROMBOSIS OF LEFT POPLITEAL VEIN: ICD-10-CM

## 2025-08-06 DIAGNOSIS — S22.42XA MULTIPLE FRACTURES OF RIBS, LEFT SIDE, INITIAL ENCOUNTER FOR CLOSED FRACTURE: ICD-10-CM

## 2025-08-06 DIAGNOSIS — N17.9 ACUTE KIDNEY FAILURE, UNSPECIFIED: ICD-10-CM
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
## 2025-08-06 DIAGNOSIS — Z96.0 PRESENCE OF UROGENITAL IMPLANTS: ICD-10-CM

## 2025-08-06 DIAGNOSIS — R79.89 OTHER SPECIFIED ABNORMAL FINDINGS OF BLOOD CHEMISTRY: ICD-10-CM

## 2025-08-06 DIAGNOSIS — I82.412 ACUTE EMBOLISM AND THROMBOSIS OF LEFT FEMORAL VEIN: ICD-10-CM

## 2025-08-06 DIAGNOSIS — N13.30 UNSPECIFIED HYDRONEPHROSIS: ICD-10-CM

## 2025-08-11 ENCOUNTER — APPOINTMENT (OUTPATIENT)
Dept: UROLOGY | Facility: CLINIC | Age: 89
End: 2025-08-11

## 2025-08-11 ENCOUNTER — APPOINTMENT (OUTPATIENT)
Dept: UROLOGY | Facility: CLINIC | Age: 89
End: 2025-08-11
Payer: MEDICARE

## 2025-08-11 DIAGNOSIS — R33.9 RETENTION OF URINE, UNSPECIFIED: ICD-10-CM

## 2025-08-11 PROCEDURE — 51702 INSERT TEMP BLADDER CATH: CPT

## 2025-09-08 ENCOUNTER — APPOINTMENT (OUTPATIENT)
Dept: UROLOGY | Facility: CLINIC | Age: 89
End: 2025-09-08
Payer: MEDICARE

## 2025-09-08 DIAGNOSIS — R33.9 RETENTION OF URINE, UNSPECIFIED: ICD-10-CM

## 2025-09-08 PROCEDURE — 51702 INSERT TEMP BLADDER CATH: CPT
